# Patient Record
Sex: FEMALE | Race: WHITE | NOT HISPANIC OR LATINO | Employment: OTHER | URBAN - METROPOLITAN AREA
[De-identification: names, ages, dates, MRNs, and addresses within clinical notes are randomized per-mention and may not be internally consistent; named-entity substitution may affect disease eponyms.]

---

## 2017-01-14 ENCOUNTER — HOSPITAL ENCOUNTER (OUTPATIENT)
Dept: URGENT CARE | Facility: CLINIC | Age: 67
Discharge: HOME | End: 2017-01-14
Attending: FAMILY MEDICINE | Admitting: FAMILY MEDICINE

## 2017-04-07 ENCOUNTER — INPATIENT HOSPITAL (AMBULATORY)
Dept: URBAN - METROPOLITAN AREA HOSPITAL 84 | Facility: HOSPITAL | Age: 67
End: 2017-04-07

## 2017-04-07 DIAGNOSIS — D12.2 BENIGN NEOPLASM OF ASCENDING COLON: ICD-10-CM

## 2017-04-07 DIAGNOSIS — R13.10 DYSPHAGIA, UNSPECIFIED: ICD-10-CM

## 2017-04-07 DIAGNOSIS — R19.7 DIARRHEA, UNSPECIFIED: ICD-10-CM

## 2017-04-07 DIAGNOSIS — R11.2 NAUSEA WITH VOMITING, UNSPECIFIED: ICD-10-CM

## 2017-04-07 DIAGNOSIS — K52.9 NONINFECTIVE GASTROENTERITIS AND COLITIS, UNSPECIFIED: ICD-10-CM

## 2017-04-07 DIAGNOSIS — K22.2 ESOPHAGEAL OBSTRUCTION: ICD-10-CM

## 2017-04-07 DIAGNOSIS — K44.9 DIAPHRAGMATIC HERNIA WITHOUT OBSTRUCTION OR GANGRENE: ICD-10-CM

## 2017-04-07 PROCEDURE — 45385 COLONOSCOPY W/LESION REMOVAL: CPT | Performed by: INTERNAL MEDICINE

## 2017-04-07 PROCEDURE — 43235 EGD DIAGNOSTIC BRUSH WASH: CPT | Performed by: INTERNAL MEDICINE

## 2017-04-07 PROCEDURE — 43450 DILATE ESOPHAGUS 1/MULT PASS: CPT | Performed by: INTERNAL MEDICINE

## 2017-04-07 PROCEDURE — 45380 COLONOSCOPY AND BIOPSY: CPT | Mod: 59 | Performed by: INTERNAL MEDICINE

## 2017-04-08 ENCOUNTER — INPATIENT HOSPITAL (AMBULATORY)
Dept: URBAN - METROPOLITAN AREA HOSPITAL 84 | Facility: HOSPITAL | Age: 67
End: 2017-04-08

## 2017-04-08 DIAGNOSIS — K22.2 ESOPHAGEAL OBSTRUCTION: ICD-10-CM

## 2017-04-08 DIAGNOSIS — R13.10 DYSPHAGIA, UNSPECIFIED: ICD-10-CM

## 2017-04-08 DIAGNOSIS — R19.7 DIARRHEA, UNSPECIFIED: ICD-10-CM

## 2017-04-08 DIAGNOSIS — K52.9 NONINFECTIVE GASTROENTERITIS AND COLITIS, UNSPECIFIED: ICD-10-CM

## 2017-04-08 DIAGNOSIS — K44.9 DIAPHRAGMATIC HERNIA WITHOUT OBSTRUCTION OR GANGRENE: ICD-10-CM

## 2017-04-08 DIAGNOSIS — R11.2 NAUSEA WITH VOMITING, UNSPECIFIED: ICD-10-CM

## 2017-04-08 PROCEDURE — 99231 SBSQ HOSP IP/OBS SF/LOW 25: CPT | Performed by: INTERNAL MEDICINE

## 2017-04-09 PROCEDURE — 99231 SBSQ HOSP IP/OBS SF/LOW 25: CPT | Performed by: INTERNAL MEDICINE

## 2017-06-29 ENCOUNTER — OFFICE (AMBULATORY)
Dept: URBAN - METROPOLITAN AREA CLINIC 64 | Facility: CLINIC | Age: 67
End: 2017-06-29

## 2017-06-29 VITALS
HEART RATE: 84 BPM | DIASTOLIC BLOOD PRESSURE: 72 MMHG | WEIGHT: 214 LBS | SYSTOLIC BLOOD PRESSURE: 135 MMHG | HEIGHT: 63 IN

## 2017-06-29 DIAGNOSIS — K22.70 BARRETT'S ESOPHAGUS WITHOUT DYSPLASIA: ICD-10-CM

## 2017-06-29 DIAGNOSIS — R19.5 OTHER FECAL ABNORMALITIES: ICD-10-CM

## 2017-06-29 LAB
CBC WITH DIFFERENTIAL/PLATELET: BASO (ABSOLUTE): 0 X10E3/UL (ref 0–0.2)
CBC WITH DIFFERENTIAL/PLATELET: BASOS: 1 %
CBC WITH DIFFERENTIAL/PLATELET: EOS (ABSOLUTE): 0.2 X10E3/UL (ref 0–0.4)
CBC WITH DIFFERENTIAL/PLATELET: EOS: 3 %
CBC WITH DIFFERENTIAL/PLATELET: HEMATOCRIT: 38.1 % (ref 34–46.6)
CBC WITH DIFFERENTIAL/PLATELET: HEMATOLOGY COMMENTS: (no result)
CBC WITH DIFFERENTIAL/PLATELET: HEMOGLOBIN: 11.9 G/DL (ref 11.1–15.9)
CBC WITH DIFFERENTIAL/PLATELET: IMMATURE CELLS: (no result)
CBC WITH DIFFERENTIAL/PLATELET: IMMATURE GRANS (ABS): 0 X10E3/UL (ref 0–0.1)
CBC WITH DIFFERENTIAL/PLATELET: IMMATURE GRANULOCYTES: 0 %
CBC WITH DIFFERENTIAL/PLATELET: LYMPHS (ABSOLUTE): 1.4 X10E3/UL (ref 0.7–3.1)
CBC WITH DIFFERENTIAL/PLATELET: LYMPHS: 27 %
CBC WITH DIFFERENTIAL/PLATELET: MCH: 29.6 PG (ref 26.6–33)
CBC WITH DIFFERENTIAL/PLATELET: MCHC: 31.2 G/DL — LOW (ref 31.5–35.7)
CBC WITH DIFFERENTIAL/PLATELET: MCV: 95 FL (ref 79–97)
CBC WITH DIFFERENTIAL/PLATELET: MONOCYTES(ABSOLUTE): 0.4 X10E3/UL (ref 0.1–0.9)
CBC WITH DIFFERENTIAL/PLATELET: MONOCYTES: 9 %
CBC WITH DIFFERENTIAL/PLATELET: NEUTROPHILS (ABSOLUTE): 3.1 X10E3/UL (ref 1.4–7)
CBC WITH DIFFERENTIAL/PLATELET: NEUTROPHILS: 60 %
CBC WITH DIFFERENTIAL/PLATELET: NRBC: (no result)
CBC WITH DIFFERENTIAL/PLATELET: PLATELETS: 249 X10E3/UL (ref 150–379)
CBC WITH DIFFERENTIAL/PLATELET: RBC: 4.02 X10E6/UL (ref 3.77–5.28)
CBC WITH DIFFERENTIAL/PLATELET: RDW: 14.2 % (ref 12.3–15.4)
CBC WITH DIFFERENTIAL/PLATELET: WBC: 5.2 X10E3/UL (ref 3.4–10.8)
FERRITIN, SERUM: 81 NG/ML (ref 15–150)
IRON AND TIBC: IRON BIND.CAP.(TIBC): 378 UG/DL (ref 250–450)
IRON AND TIBC: IRON SATURATION: 25 % (ref 15–55)
IRON AND TIBC: IRON, SERUM: 94 UG/DL (ref 27–139)
IRON AND TIBC: UIBC: 284 UG/DL (ref 118–369)
VITAMIN B12 AND FOLATE: FOLATE (FOLIC ACID), SERUM: 20 NG/ML (ref 3–?)
VITAMIN B12 AND FOLATE: VITAMIN B12: 253 PG/ML (ref 211–946)

## 2017-06-29 PROCEDURE — 99213 OFFICE O/P EST LOW 20 MIN: CPT | Performed by: NURSE PRACTITIONER

## 2017-10-13 ENCOUNTER — OFFICE (AMBULATORY)
Dept: URBAN - METROPOLITAN AREA CLINIC 64 | Facility: CLINIC | Age: 67
End: 2017-10-13

## 2017-10-13 VITALS
SYSTOLIC BLOOD PRESSURE: 140 MMHG | HEART RATE: 82 BPM | DIASTOLIC BLOOD PRESSURE: 76 MMHG | HEIGHT: 63 IN | WEIGHT: 218 LBS

## 2017-10-13 DIAGNOSIS — R14.0 ABDOMINAL DISTENSION (GASEOUS): ICD-10-CM

## 2017-10-13 DIAGNOSIS — R19.7 DIARRHEA, UNSPECIFIED: ICD-10-CM

## 2017-10-13 LAB
C DIFFICILE TOXINS A+B, EIA: NEGATIVE
OVA + PARASITE EXAM: (no result)
RESULT: RESULT 1: (no result)
STOOL CULTURE: CAMPYLOBACTER CULTURE: (no result)
STOOL CULTURE: E COLI SHIGA TOXIN EIA: NEGATIVE
STOOL CULTURE: SALMONELLA/SHIGELLA SCREEN: (no result)
WHITE BLOOD CELLS (WBC), STOOL: (no result)

## 2017-10-13 PROCEDURE — 99212 OFFICE O/P EST SF 10 MIN: CPT | Performed by: NURSE PRACTITIONER

## 2017-10-13 RX ORDER — SIMETHICONE 180 MG/1
540 CAPSULE, GELATIN COATED ORAL
Qty: 90 | Refills: 6 | Status: COMPLETED
Start: 2017-10-13 | End: 2018-12-19

## 2018-12-20 ENCOUNTER — OFFICE (AMBULATORY)
Dept: URBAN - METROPOLITAN AREA PATHOLOGY 4 | Facility: PATHOLOGY | Age: 68
End: 2018-12-20

## 2018-12-20 ENCOUNTER — HOSPITAL ENCOUNTER (OUTPATIENT)
Dept: OTHER | Facility: HOSPITAL | Age: 68
Setting detail: SPECIMEN
Discharge: HOME | End: 2018-12-20
Attending: INTERNAL MEDICINE | Admitting: INTERNAL MEDICINE

## 2018-12-20 ENCOUNTER — ON CAMPUS - OUTPATIENT (AMBULATORY)
Dept: URBAN - METROPOLITAN AREA HOSPITAL 2 | Facility: HOSPITAL | Age: 68
End: 2018-12-20

## 2018-12-20 VITALS
HEART RATE: 51 BPM | DIASTOLIC BLOOD PRESSURE: 52 MMHG | SYSTOLIC BLOOD PRESSURE: 111 MMHG | SYSTOLIC BLOOD PRESSURE: 135 MMHG | TEMPERATURE: 97.4 F | OXYGEN SATURATION: 98 % | DIASTOLIC BLOOD PRESSURE: 74 MMHG | HEART RATE: 78 BPM | RESPIRATION RATE: 18 BRPM | DIASTOLIC BLOOD PRESSURE: 53 MMHG | HEART RATE: 72 BPM | HEIGHT: 64 IN | HEART RATE: 62 BPM | HEART RATE: 53 BPM | DIASTOLIC BLOOD PRESSURE: 58 MMHG | SYSTOLIC BLOOD PRESSURE: 136 MMHG | WEIGHT: 210 LBS | RESPIRATION RATE: 16 BRPM | OXYGEN SATURATION: 97 % | RESPIRATION RATE: 15 BRPM | SYSTOLIC BLOOD PRESSURE: 142 MMHG | DIASTOLIC BLOOD PRESSURE: 70 MMHG | SYSTOLIC BLOOD PRESSURE: 130 MMHG | OXYGEN SATURATION: 100 %

## 2018-12-20 DIAGNOSIS — K22.70 BARRETT'S ESOPHAGUS WITHOUT DYSPLASIA: ICD-10-CM

## 2018-12-20 DIAGNOSIS — K44.9 DIAPHRAGMATIC HERNIA WITHOUT OBSTRUCTION OR GANGRENE: ICD-10-CM

## 2018-12-20 LAB
GI HISTOLOGY: A. UNSPECIFIED: (no result)
GI HISTOLOGY: PDF REPORT: (no result)

## 2018-12-20 PROCEDURE — 88305 TISSUE EXAM BY PATHOLOGIST: CPT | Mod: 26 | Performed by: INTERNAL MEDICINE

## 2018-12-20 PROCEDURE — 43239 EGD BIOPSY SINGLE/MULTIPLE: CPT | Performed by: INTERNAL MEDICINE

## 2019-06-03 ENCOUNTER — HOSPITAL ENCOUNTER (OUTPATIENT)
Dept: OTHER | Facility: HOSPITAL | Age: 69
Discharge: HOME | End: 2019-06-03
Attending: EMERGENCY MEDICINE | Admitting: EMERGENCY MEDICINE

## 2019-07-25 PROBLEM — M79.661 RIGHT CALF PAIN: Status: ACTIVE | Noted: 2018-06-26

## 2019-07-25 PROBLEM — M25.551 HIP PAIN, RIGHT: Status: ACTIVE | Noted: 2017-01-14

## 2019-07-25 PROBLEM — I10 HYPERTENSION: Status: ACTIVE | Noted: 2019-07-25

## 2019-07-25 PROBLEM — H92.01 OTALGIA, RIGHT: Status: ACTIVE | Noted: 2018-12-28

## 2019-07-25 PROBLEM — K21.9 GASTROESOPHAGEAL REFLUX DISEASE: Status: ACTIVE | Noted: 2019-07-25

## 2019-07-25 PROBLEM — E07.9 THYROID DISORDER: Status: ACTIVE | Noted: 2019-07-25

## 2019-07-25 PROBLEM — E78.5 HYPERLIPIDEMIA: Status: ACTIVE | Noted: 2019-07-25

## 2019-07-25 PROBLEM — E03.9 HYPOTHYROIDISM: Status: ACTIVE | Noted: 2019-07-25

## 2019-07-25 RX ORDER — AMLODIPINE BESYLATE AND BENAZEPRIL HYDROCHLORIDE 5; 20 MG/1; MG/1
CAPSULE ORAL
COMMUNITY
Start: 2018-06-26 | End: 2021-03-08

## 2019-07-25 RX ORDER — DULOXETIN HYDROCHLORIDE 20 MG/1
20 CAPSULE, DELAYED RELEASE ORAL DAILY
Refills: 3 | COMMUNITY
Start: 2019-05-06 | End: 2019-07-30

## 2019-07-25 RX ORDER — METOPROLOL TARTRATE 50 MG/1
TABLET, FILM COATED ORAL
COMMUNITY
Start: 2018-06-26 | End: 2021-03-08

## 2019-07-25 RX ORDER — OMEPRAZOLE 40 MG/1
CAPSULE, DELAYED RELEASE ORAL
COMMUNITY
Start: 2014-04-30 | End: 2020-05-14

## 2019-07-25 RX ORDER — LEVOTHYROXINE SODIUM 0.12 MG/1
125 TABLET ORAL DAILY
COMMUNITY
Start: 2015-10-04 | End: 2021-09-07 | Stop reason: SDUPTHER

## 2019-07-25 RX ORDER — HYDROCODONE BITARTRATE AND ACETAMINOPHEN 5; 325 MG/1; MG/1
TABLET ORAL
Refills: 0 | COMMUNITY
Start: 2019-06-01 | End: 2019-07-30

## 2019-07-25 RX ORDER — TIMOLOL MALEATE 10 MG/1
TABLET ORAL
COMMUNITY
Start: 2018-06-26 | End: 2019-11-07

## 2019-07-25 RX ORDER — GABAPENTIN 600 MG/1
TABLET ORAL
COMMUNITY
Start: 2018-06-26 | End: 2019-07-30 | Stop reason: SDUPTHER

## 2019-07-30 ENCOUNTER — OFFICE VISIT (OUTPATIENT)
Dept: ENDOCRINOLOGY | Facility: CLINIC | Age: 69
End: 2019-07-30

## 2019-07-30 VITALS
OXYGEN SATURATION: 98 % | HEIGHT: 63 IN | BODY MASS INDEX: 37.03 KG/M2 | DIASTOLIC BLOOD PRESSURE: 68 MMHG | WEIGHT: 209 LBS | HEART RATE: 77 BPM | SYSTOLIC BLOOD PRESSURE: 110 MMHG

## 2019-07-30 DIAGNOSIS — E03.9 ACQUIRED HYPOTHYROIDISM: ICD-10-CM

## 2019-07-30 DIAGNOSIS — E11.65 UNCONTROLLED TYPE 2 DIABETES MELLITUS WITH HYPERGLYCEMIA (HCC): Primary | ICD-10-CM

## 2019-07-30 DIAGNOSIS — I10 ESSENTIAL HYPERTENSION: ICD-10-CM

## 2019-07-30 DIAGNOSIS — E78.5 HYPERLIPIDEMIA, UNSPECIFIED HYPERLIPIDEMIA TYPE: ICD-10-CM

## 2019-07-30 PROBLEM — IMO0002 DIABETES MELLITUS TYPE 2, UNCONTROLLED: Status: ACTIVE | Noted: 2019-07-30

## 2019-07-30 PROCEDURE — 99204 OFFICE O/P NEW MOD 45 MIN: CPT | Performed by: INTERNAL MEDICINE

## 2019-07-30 RX ORDER — BRINZOLAMIDE 10 MG/ML
1 SUSPENSION/ DROPS OPHTHALMIC 3 TIMES DAILY
COMMUNITY
End: 2020-05-14

## 2019-07-30 RX ORDER — HYDROCHLOROTHIAZIDE 25 MG/1
25 TABLET ORAL DAILY
COMMUNITY
End: 2019-07-30

## 2019-07-30 RX ORDER — MIRABEGRON 50 MG/1
TABLET, FILM COATED, EXTENDED RELEASE ORAL
COMMUNITY
Start: 2019-05-07 | End: 2020-05-14

## 2019-07-30 RX ORDER — VALSARTAN 80 MG/1
TABLET ORAL
COMMUNITY
Start: 2019-05-22 | End: 2020-05-14

## 2019-07-30 RX ORDER — MAGNESIUM 200 MG
1000 TABLET ORAL DAILY
Qty: 100 EACH | Refills: 4 | Status: SHIPPED | OUTPATIENT
Start: 2019-07-30 | End: 2019-11-07

## 2019-07-30 RX ORDER — ROPINIROLE 1 MG/1
1 TABLET, FILM COATED ORAL 3 TIMES DAILY
COMMUNITY
End: 2019-07-30

## 2019-07-30 RX ORDER — GABAPENTIN 100 MG/1
100 CAPSULE ORAL 3 TIMES DAILY
COMMUNITY
Start: 2019-05-23 | End: 2019-11-07

## 2019-07-30 RX ORDER — BLOOD SUGAR DIAGNOSTIC
STRIP MISCELLANEOUS
Refills: 3 | COMMUNITY
Start: 2019-06-14 | End: 2023-01-28

## 2019-07-30 RX ORDER — LISINOPRIL 40 MG/1
40 TABLET ORAL DAILY
COMMUNITY
End: 2019-07-30

## 2019-07-30 RX ORDER — DULOXETIN HYDROCHLORIDE 20 MG/1
20 CAPSULE, DELAYED RELEASE ORAL DAILY
Qty: 30 CAPSULE | Refills: 3 | Status: SHIPPED | OUTPATIENT
Start: 2019-07-30 | End: 2020-05-14

## 2019-07-30 RX ORDER — ERGOCALCIFEROL (VITAMIN D2) 50 MCG
2000 CAPSULE ORAL DAILY
Qty: 100 CAPSULE | Refills: 4 | Status: SHIPPED | OUTPATIENT
Start: 2019-07-30 | End: 2019-11-07

## 2019-07-30 RX ORDER — FENOFIBRATE 160 MG/1
160 TABLET ORAL DAILY
COMMUNITY
Start: 2019-05-22 | End: 2022-03-10

## 2019-07-30 RX ORDER — COLESEVELAM 180 1/1
1875 TABLET ORAL 3 TIMES DAILY
COMMUNITY
End: 2019-07-30

## 2019-07-30 RX ORDER — BACLOFEN 10 MG/1
TABLET ORAL
Refills: 0 | COMMUNITY
Start: 2019-06-26 | End: 2019-07-30

## 2019-07-30 RX ORDER — EZETIMIBE 10 MG/1
10 TABLET ORAL DAILY
COMMUNITY
End: 2019-07-30

## 2019-07-30 RX ORDER — PRAVASTATIN SODIUM 40 MG
40 TABLET ORAL NIGHTLY
COMMUNITY
Start: 2019-05-22

## 2019-07-30 RX ORDER — NITROFURANTOIN 25; 75 MG/1; MG/1
CAPSULE ORAL
Refills: 0 | COMMUNITY
Start: 2019-07-02 | End: 2019-07-30

## 2019-07-30 NOTE — PATIENT INSTRUCTIONS
Please get all labs done fasting in the next few days  Please a start vitamin D 2000 units p.o. daily  Please start vitamin B12 1000 mcg sublingual daily  Please arrange for comprehensive diabetes education  Follow-up in 3 months.

## 2019-07-30 NOTE — PROGRESS NOTES
Endocrine Consult Outpatient  Referred by Dr. Jose Brenner for diabetes mellitus type 2 consultation.  Patient Care Team:  Jose Brenner MD as PCP - General  Sanju Aldana MD as PCP - Claims Attributed  Jose Brenner MD as PCP - Family Medicine     Chief Complaint: Uncontrolled Diabetes Type 2    HPI: 69-year-old female with history of type 2 diabetes which was diagnosed in December 2018, history of hypertension, hyperlipidemia, hypothyroidism, stomach ulcer, diverticulitis, reflux disease, Clement's esophagus is referred for diabetes consultation.  She has not done diabetes education.  She checks blood sugars 1-2 times per day pretty good numbers most of the time.  She unfortunately did not bring blood sugar records for review.  She is trying to work on her diet.  She is currently on metformin 500 mg twice a day which was started at the end of May 2019.  She tells me that since metformin was a started her blood sugars have improved.  And she is tolerating metformin well.    Hypertention: Well-controlled  Hyperlipidemia: On pravastatin.    Old records reviewed: Labs from May 14, 2019 showed a white count of 6.4, hemoglobin 12.9, hematocrit 41.9 and platelet count was 67, sodium 147, potassium 4.5, chloride 105, CO2 28, glucose 128, BUN was 14, creatinine 7 with calcium 9.9, AST 22, ALT 23, A1c was 8.9%, LDL cholesterol 103, triglycerides 668, TSH was 1.78, urine microalbumin creatinine ratio was 10    Past Medical History:   Diagnosis Date   • Diverticulitis    • Hyperlipidemia    • Hypertension    • Type 2 diabetes mellitus (CMS/[facility])        Social History     Socioeconomic History   • Marital status:      Spouse name: Not on file   • Number of children: Not on file   • Years of education: Not on file   • Highest education level: Not on file   Tobacco Use   • Smoking status: Former Smoker   Substance and Sexual Activity   • Alcohol use: Yes     Frequency: Never     Comment: occasonally /  social       Family History   Problem Relation Age of Onset   • Stroke Mother    • Cancer Father         prostate   • Heart disease Brother    • Cancer Brother         pranciritic   • Cancer Maternal Grandmother         colon   • Heart disease Paternal Grandmother        Allergies   Allergen Reactions   • Amoxicillin-Pot Clavulanate Diarrhea   • Ciprofloxacin Myalgia       ROS:   Constitutional:  Admit fatigue, tiredness.    Eyes:  Denies change in visual acuity   HENT:  Denies nasal congestion or sore throat   Respiratory: denies cough, shortness of breath.   Cardiovascular:  denies chest pain, edema   GI:  Denies abdominal pain, nausea, vomiting  :  Denies dysuria   Musculoskeletal:  Denies back pain or joint pain   Integument:  Denies dry skin, rash   Neurologic:  Denies headache, focal weakness or sensory changes   Endocrine:  Denies polyuria or polydipsia   Psychiatric:  Denies depression or anxiety      Vitals:    07/30/19 1400   BP: 110/68   Pulse: 77   SpO2: 98%        Physical Exam:  GEN: NAD, conversant  EYES: EOMI, PERRL, no conjunctival erythema  NECK: no thyromegaly, full ROM   CV: RRR, no murmurs/rubs/gallops, no peripheral edema  LUNG: CTAB, no wheezes/rales/ronchi  SKIN: no rashes, no acanthosis  MSK: no deformities, full ROM of all extremities  NEURO: no tremors, DTR normal  PSYCH: AOX3, appropriate mood, affect normal      Results Review:     I reviewed the patient's new clinical results.    Lab Results   Component Value Date    GLUCOSE 112 (H) 06/01/2019    BUN 16 06/01/2019    CREATININE 1.0 06/01/2019    BCR 16.0 06/01/2019    K 3.7 06/01/2019    CO2 24 06/01/2019    CALCIUM 9.1 06/01/2019    ALBUMIN 3.8 06/01/2019    LABIL2 2.1 (H) 06/01/2019    AST 19 06/01/2019    ALT 25 06/01/2019    CHOL 249 (H) 04/07/2017    TRIG 616 (H) 04/07/2017    HDL 28 (L) 04/07/2017    LDL 53 04/07/2017     No results found for: HGBA1C  Lab Results   Component Value Date    CREATININE 1.0 06/01/2019     Lab  Results   Component Value Date    TSH 0.98 04/07/2017       Medication Review: Reviewed.       Current Outpatient Medications:   •  ACCU-CHEK DOLLY PLUS test strip, USE TO TEST BLOOD SUGAR BID, Disp: , Rfl: 3  •  amLODIPine Besy-Benazepril HCl (LOTREL PO), LOTREL CAPS, Disp: , Rfl:   •  Dorzolamide HCl-Timolol Mal PF 22.3-6.8 MG/ML solution, , Disp: , Rfl:   •  fenofibrate 160 MG tablet, , Disp: , Rfl:   •  gabapentin (NEURONTIN) 100 MG capsule, 100 mg 3 (Three) Times a Day., Disp: , Rfl:   •  levothyroxine (SYNTHROID) 125 MCG tablet, SYNTHROID 125 MCG TABS, Disp: , Rfl:   •  metFORMIN (GLUCOPHAGE) 500 MG tablet, , Disp: , Rfl:   •  metoprolol tartrate (LOPRESSOR) 100 MG tablet, METOPROLOL TARTRATE TABS, Disp: , Rfl:   •  MYRBETRIQ 50 MG tablet sustained-release 24 hour 24 hr tablet, , Disp: , Rfl:   •  omeprazole (priLOSEC) 40 MG capsule, OMEPRAZOLE 40 MG CPDR, Disp: , Rfl:   •  pravastatin (PRAVACHOL) 40 MG tablet, , Disp: , Rfl:   •  valsartan (DIOVAN) 80 MG tablet, , Disp: , Rfl:   •  brinzolamide (AZOPT) 1 % ophthalmic suspension, 1 drop 3 (Three) Times a Day., Disp: , Rfl:   •  timolol (BLOCADREN) 10 MG tablet, TIMOLOL MALEATE TABS, Disp: , Rfl:     Assessment/Plan   1. Diabetes mellitus type II: She tells me her blood sugars have improved since metformin, at this time I will I will refer her for diabetes education, continue metformin and get some labs for further evaluation before making any further recommendations with regards to medications.  2.  Hypertension: Well-controlled, continue current medications  3.  Hyperlipidemia: Specifically she has high triglyceride level, she is on fenofibrate with pravastatin, I have advised her to cut down the dairy products, fried food and avoid processed carbohydrates especially official sugars.  We will follow lipid panel.  4.  Hypothyroidism: We will follow TSH and free T4.   5.  Ddiabetic neuropathy: Advised to take vitamin D and B12 supplementation.      "        Preeti Herrera MD FACE.  06/15/19  4:34 PM      EMR Dragon / transcription disclaimer:     \"Dictated utilizing Dragon dictation\".               "

## 2019-08-02 ENCOUNTER — LAB (OUTPATIENT)
Dept: LAB | Facility: HOSPITAL | Age: 69
End: 2019-08-02

## 2019-08-02 DIAGNOSIS — I10 ESSENTIAL HYPERTENSION: ICD-10-CM

## 2019-08-02 DIAGNOSIS — E11.65 UNCONTROLLED TYPE 2 DIABETES MELLITUS WITH HYPERGLYCEMIA (HCC): ICD-10-CM

## 2019-08-02 DIAGNOSIS — E03.9 ACQUIRED HYPOTHYROIDISM: ICD-10-CM

## 2019-08-02 DIAGNOSIS — E78.5 HYPERLIPIDEMIA, UNSPECIFIED HYPERLIPIDEMIA TYPE: ICD-10-CM

## 2019-08-02 LAB
ALBUMIN SERPL-MCNC: 3.9 G/DL (ref 3.5–4.8)
ALBUMIN UR-MCNC: 152 MG/L
ALBUMIN/GLOB SERPL: 1.5 G/DL (ref 1–1.7)
ALP SERPL-CCNC: 58 U/L (ref 32–91)
ALT SERPL W P-5'-P-CCNC: 23 U/L (ref 14–54)
ANION GAP SERPL CALCULATED.3IONS-SCNC: 13.2 MMOL/L (ref 5–15)
ARTICHOKE IGE QN: 40 MG/DL (ref 0–100)
AST SERPL-CCNC: 16 U/L (ref 15–41)
BILIRUB SERPL-MCNC: 0.7 MG/DL (ref 0.3–1.2)
BUN BLD-MCNC: 18 MG/DL (ref 8–20)
BUN/CREAT SERPL: 18 (ref 5.4–26.2)
CALCIUM SPEC-SCNC: 9.4 MG/DL (ref 8.9–10.3)
CHLORIDE SERPL-SCNC: 103 MMOL/L (ref 101–111)
CHOLEST SERPL-MCNC: 135 MG/DL
CO2 SERPL-SCNC: 27 MMOL/L (ref 22–32)
CREAT BLD-MCNC: 1 MG/DL (ref 0.4–1)
CREAT UR-MCNC: 134.4 MG/DL
GFR SERPL CREATININE-BSD FRML MDRD: 55 ML/MIN/1.73
GLOBULIN UR ELPH-MCNC: 2.6 GM/DL (ref 2.5–3.8)
GLUCOSE BLD-MCNC: 115 MG/DL (ref 65–99)
HBA1C MFR BLD: 7.3 % (ref 3.5–5.6)
HDLC SERPL QL: 3.55
HDLC SERPL-MCNC: 38 MG/DL
LDLC/HDLC SERPL: 1.56 {RATIO}
MICROALBUMIN/CREAT UR: 113.1 UG/MG
POTASSIUM BLD-SCNC: 4.2 MMOL/L (ref 3.6–5.1)
PROT SERPL-MCNC: 6.5 G/DL (ref 6.1–7.9)
SODIUM BLD-SCNC: 139 MMOL/L (ref 136–144)
TRIGL SERPL-MCNC: 188 MG/DL
VLDLC SERPL-MCNC: 37.6 MG/DL

## 2019-08-02 PROCEDURE — 36415 COLL VENOUS BLD VENIPUNCTURE: CPT

## 2019-08-02 PROCEDURE — 83036 HEMOGLOBIN GLYCOSYLATED A1C: CPT | Performed by: INTERNAL MEDICINE

## 2019-08-02 PROCEDURE — 82570 ASSAY OF URINE CREATININE: CPT | Performed by: INTERNAL MEDICINE

## 2019-08-02 PROCEDURE — 80053 COMPREHEN METABOLIC PANEL: CPT | Performed by: INTERNAL MEDICINE

## 2019-08-02 PROCEDURE — 82043 UR ALBUMIN QUANTITATIVE: CPT | Performed by: INTERNAL MEDICINE

## 2019-08-02 PROCEDURE — 80061 LIPID PANEL: CPT | Performed by: INTERNAL MEDICINE

## 2019-08-08 ENCOUNTER — OFFICE VISIT (OUTPATIENT)
Dept: ENDOCRINOLOGY | Facility: CLINIC | Age: 69
End: 2019-08-08

## 2019-08-08 DIAGNOSIS — E11.65 UNCONTROLLED TYPE 2 DIABETES MELLITUS WITH HYPERGLYCEMIA (HCC): ICD-10-CM

## 2019-08-08 PROCEDURE — G0108 DIAB MANAGE TRN  PER INDIV: HCPCS | Performed by: DIETITIAN, REGISTERED

## 2019-08-20 ENCOUNTER — OFFICE VISIT (OUTPATIENT)
Dept: ENDOCRINOLOGY | Facility: CLINIC | Age: 69
End: 2019-08-20

## 2019-08-20 DIAGNOSIS — E11.9 CONTROLLED TYPE 2 DIABETES MELLITUS WITHOUT COMPLICATION, WITHOUT LONG-TERM CURRENT USE OF INSULIN (HCC): Primary | ICD-10-CM

## 2019-08-20 PROCEDURE — G0109 DIAB MANAGE TRN IND/GROUP: HCPCS | Performed by: INTERNAL MEDICINE

## 2019-08-29 ENCOUNTER — TRANSCRIBE ORDERS (OUTPATIENT)
Dept: ADMINISTRATIVE | Facility: HOSPITAL | Age: 69
End: 2019-08-29

## 2019-08-29 DIAGNOSIS — M79.605 BILATERAL LEG PAIN: ICD-10-CM

## 2019-08-29 DIAGNOSIS — I70.213 INTERMITTENT CLAUDICATION OF BOTH LOWER EXTREMITIES DUE TO ATHEROSCLEROSIS (HCC): ICD-10-CM

## 2019-08-29 DIAGNOSIS — R09.89 DECREASED PEDAL PULSES: Primary | ICD-10-CM

## 2019-08-29 DIAGNOSIS — M79.604 BILATERAL LEG PAIN: ICD-10-CM

## 2019-09-04 ENCOUNTER — OFFICE VISIT (OUTPATIENT)
Dept: ENDOCRINOLOGY | Facility: CLINIC | Age: 69
End: 2019-09-04

## 2019-09-04 DIAGNOSIS — E11.65 TYPE 2 DIABETES MELLITUS WITH HYPERGLYCEMIA, WITHOUT LONG-TERM CURRENT USE OF INSULIN (HCC): ICD-10-CM

## 2019-09-04 PROCEDURE — G0109 DIAB MANAGE TRN IND/GROUP: HCPCS | Performed by: DIETITIAN, REGISTERED

## 2019-09-05 ENCOUNTER — HOSPITAL ENCOUNTER (OUTPATIENT)
Dept: CT IMAGING | Facility: HOSPITAL | Age: 69
Discharge: HOME | End: 2019-09-05

## 2019-09-05 ENCOUNTER — HOSPITAL ENCOUNTER (OUTPATIENT)
Dept: CARDIOLOGY | Facility: HOSPITAL | Age: 69
Setting detail: HOSPITAL OUTPATIENT SURGERY
Discharge: HOME | End: 2019-09-05
Admitting: NURSE PRACTITIONER

## 2019-09-05 DIAGNOSIS — M79.605 BILATERAL LEG PAIN: ICD-10-CM

## 2019-09-05 DIAGNOSIS — I70.213 INTERMITTENT CLAUDICATION OF BOTH LOWER EXTREMITIES DUE TO ATHEROSCLEROSIS (HCC): ICD-10-CM

## 2019-09-05 DIAGNOSIS — M79.604 BILATERAL LEG PAIN: ICD-10-CM

## 2019-09-05 DIAGNOSIS — R09.89 DECREASED PEDAL PULSES: ICD-10-CM

## 2019-09-05 LAB
BH CV LOWER ARTERIAL LEFT ABI RATIO: 0.63
BH CV LOWER ARTERIAL LEFT CALF RATIO: 0.8
BH CV LOWER ARTERIAL LEFT DORSALIS PEDIS SYS MAX: 76 MMHG
BH CV LOWER ARTERIAL LEFT GREAT TOE SYS MAX: 63 MMHG
BH CV LOWER ARTERIAL LEFT LOW THIGH SYS MAX: 223 MMHG
BH CV LOWER ARTERIAL LEFT POPLITEAL SYS MAX: 110 MMHG
BH CV LOWER ARTERIAL LEFT POST TIBIAL SYS MAX: 87 MMHG
BH CV LOWER ARTERIAL LEFT TBI RATIO: 0.46
BH CV LOWER ARTERIAL RIGHT ABI RATIO: 1.07
BH CV LOWER ARTERIAL RIGHT CALF RATIO: 1.13
BH CV LOWER ARTERIAL RIGHT DORSALIS PEDIS SYS MAX: 143 MMHG
BH CV LOWER ARTERIAL RIGHT GREAT TOE SYS MAX: 119 MMHG
BH CV LOWER ARTERIAL RIGHT LOW THIGH SYS MAX: 187 MMHG
BH CV LOWER ARTERIAL RIGHT POPLITEAL SYS MAX: 156 MMHG
BH CV LOWER ARTERIAL RIGHT POST TIBIAL SYS MAX: 156 MMHG
BH CV LOWER ARTERIAL RIGHT TBI RATIO: 0.86
CREAT BLDA-MCNC: 1.2 MG/DL (ref 0.6–1.3)
UPPER ARTERIAL LEFT ARM BRACHIAL SYS MAX: 131 MMHG
UPPER ARTERIAL RIGHT ARM BRACHIAL SYS MAX: 138 MMHG

## 2019-09-05 PROCEDURE — 93923 UPR/LXTR ART STDY 3+ LVLS: CPT

## 2019-09-05 PROCEDURE — 82565 ASSAY OF CREATININE: CPT

## 2019-09-11 ENCOUNTER — TRANSCRIBE ORDERS (OUTPATIENT)
Dept: ADMINISTRATIVE | Facility: HOSPITAL | Age: 69
End: 2019-09-11

## 2019-09-11 ENCOUNTER — LAB (OUTPATIENT)
Dept: LAB | Facility: HOSPITAL | Age: 69
End: 2019-09-11

## 2019-09-11 DIAGNOSIS — I10 HYPERTENSION, UNSPECIFIED TYPE: ICD-10-CM

## 2019-09-11 DIAGNOSIS — I10 HYPERTENSION, UNSPECIFIED TYPE: Primary | ICD-10-CM

## 2019-09-11 LAB
ANION GAP SERPL CALCULATED.3IONS-SCNC: 12.6 MMOL/L (ref 5–15)
BUN BLD-MCNC: 20 MG/DL (ref 8–20)
BUN/CREAT SERPL: 20 (ref 5.4–26.2)
CALCIUM SPEC-SCNC: 9.6 MG/DL (ref 8.9–10.3)
CHLORIDE SERPL-SCNC: 104 MMOL/L (ref 101–111)
CO2 SERPL-SCNC: 29 MMOL/L (ref 22–32)
CREAT BLD-MCNC: 1 MG/DL (ref 0.4–1)
GFR SERPL CREATININE-BSD FRML MDRD: 55 ML/MIN/1.73
GLUCOSE BLD-MCNC: 105 MG/DL (ref 65–99)
POTASSIUM BLD-SCNC: 4.6 MMOL/L (ref 3.6–5.1)
SODIUM BLD-SCNC: 141 MMOL/L (ref 136–144)

## 2019-09-11 PROCEDURE — 36415 COLL VENOUS BLD VENIPUNCTURE: CPT

## 2019-09-11 PROCEDURE — 80048 BASIC METABOLIC PNL TOTAL CA: CPT

## 2019-09-12 ENCOUNTER — HOSPITAL ENCOUNTER (OUTPATIENT)
Dept: CT IMAGING | Facility: HOSPITAL | Age: 69
Discharge: HOME | End: 2019-09-12
Admitting: NURSE PRACTITIONER

## 2019-09-12 PROCEDURE — 75635 CT ANGIO ABDOMINAL ARTERIES: CPT

## 2019-09-12 PROCEDURE — 0 IOPAMIDOL PER 1 ML: Performed by: NURSE PRACTITIONER

## 2019-09-12 RX ADMIN — IOPAMIDOL 100 ML: 755 INJECTION, SOLUTION INTRAVENOUS at 11:45

## 2019-10-01 ENCOUNTER — APPOINTMENT (OUTPATIENT)
Dept: VASCULAR SURGERY | Facility: HOSPITAL | Age: 69
End: 2019-10-01

## 2019-10-01 PROCEDURE — G0463 HOSPITAL OUTPT CLINIC VISIT: HCPCS | Performed by: SURGERY

## 2019-10-02 ENCOUNTER — TRANSCRIBE ORDERS (OUTPATIENT)
Dept: ADMINISTRATIVE | Facility: HOSPITAL | Age: 69
End: 2019-10-02

## 2019-10-02 DIAGNOSIS — I73.9 PERIPHERAL VASCULAR DISEASE, UNSPECIFIED (HCC): Primary | ICD-10-CM

## 2019-10-30 RX ORDER — ACETAMINOPHEN 160 MG
TABLET,DISINTEGRATING ORAL
Refills: 4 | COMMUNITY
Start: 2019-07-30 | End: 2020-04-20

## 2019-10-30 RX ORDER — TRIAMCINOLONE ACETONIDE 1 MG/G
CREAM TOPICAL
Refills: 3 | COMMUNITY
Start: 2019-08-28 | End: 2019-11-07

## 2019-10-30 RX ORDER — INFLUENZA VACCINE, ADJUVANTED 15; 15; 15 UG/.5ML; UG/.5ML; UG/.5ML
INJECTION, SUSPENSION INTRAMUSCULAR
Refills: 0 | COMMUNITY
Start: 2019-09-19 | End: 2020-05-14

## 2019-10-30 RX ORDER — CHOLECALCIFEROL (VITAMIN D3) 25 MCG
1 TABLET,CHEWABLE ORAL DAILY
Refills: 4 | COMMUNITY
Start: 2019-07-30 | End: 2022-03-10

## 2019-11-07 ENCOUNTER — OFFICE VISIT (OUTPATIENT)
Dept: ENDOCRINOLOGY | Facility: CLINIC | Age: 69
End: 2019-11-07

## 2019-11-07 VITALS
HEIGHT: 63 IN | WEIGHT: 212 LBS | SYSTOLIC BLOOD PRESSURE: 130 MMHG | HEART RATE: 93 BPM | DIASTOLIC BLOOD PRESSURE: 65 MMHG | BODY MASS INDEX: 37.56 KG/M2 | OXYGEN SATURATION: 94 %

## 2019-11-07 DIAGNOSIS — I10 ESSENTIAL HYPERTENSION: ICD-10-CM

## 2019-11-07 DIAGNOSIS — E11.65 UNCONTROLLED TYPE 2 DIABETES MELLITUS WITH HYPERGLYCEMIA (HCC): Primary | ICD-10-CM

## 2019-11-07 DIAGNOSIS — E03.9 ACQUIRED HYPOTHYROIDISM: ICD-10-CM

## 2019-11-07 DIAGNOSIS — E78.2 MIXED HYPERLIPIDEMIA: ICD-10-CM

## 2019-11-07 DIAGNOSIS — E11.42 DIABETIC PERIPHERAL NEUROPATHY (HCC): ICD-10-CM

## 2019-11-07 PROCEDURE — 99214 OFFICE O/P EST MOD 30 MIN: CPT | Performed by: INTERNAL MEDICINE

## 2019-11-07 RX ORDER — METRONIDAZOLE 500 MG/1
TABLET ORAL
COMMUNITY
Start: 2019-11-06 | End: 2020-05-14

## 2019-11-07 RX ORDER — CIPROFLOXACIN 500 MG/1
TABLET, FILM COATED ORAL
COMMUNITY
Start: 2019-11-06 | End: 2020-05-14

## 2019-11-07 RX ORDER — ASPIRIN 81 MG/1
81 TABLET ORAL DAILY
COMMUNITY
End: 2023-02-02 | Stop reason: HOSPADM

## 2019-11-07 RX ORDER — CILOSTAZOL 100 MG/1
100 TABLET ORAL 2 TIMES DAILY
Refills: 5 | COMMUNITY
Start: 2019-10-24 | End: 2023-01-28

## 2019-11-07 NOTE — PATIENT INSTRUCTIONS
Continue current medications  Always keep glucose source with you in case of low blood sugars  Always get annual eye exam and flu vaccine  Follow-up in 6 months with labs.

## 2019-11-07 NOTE — PROGRESS NOTES
Endocrine Consult Outpatient  Referred by Dr. Jose Brenner for diabetes mellitus type 2 consultation.  Patient Care Team:  Jose Brenner MD as PCP - General  Sanju Aldana MD as PCP - Claims Attributed  Jose Brenner MD as PCP - Family Medicine     Chief Complaint: Uncontrolled Diabetes Type 2    HPI: 69-year-old female with history of type 2 diabetes which was diagnosed in December 2018, history of hypertension, hyperlipidemia, hypothyroidism, stomach ulcer, diverticulitis, reflux disease, Clement's esophagus is referred for diabetes consultation.  She has not done diabetes education.  She checks blood sugars 1-2 times per day pretty good numbers most of the time.  She unfortunately did not bring blood sugar records for review.  She is trying to work on her diet.  She is currently on metformin 500 mg twice a day which was started at the end of May 2019.  She tells me that since metformin was a started her blood sugars have improved.  And she is tolerating metformin well.    Hypertention: Well-controlled  Hyperlipidemia: On pravastatin.    Data reviewed:   Labs from August 2, 2019 showed an A1c of 7.3%, LDL 40, triglycerides 188, urine microalbumin creatinine ratio was 113.  Labs from May 14, 2019 showed a white count of 6.4, hemoglobin 12.9, hematocrit 41.9 and platelet count was 67, sodium 147, potassium 4.5, chloride 105, CO2 28, glucose 128, BUN was 14, creatinine 7 with calcium 9.9, AST 22, ALT 23, A1c was 8.9%, LDL cholesterol 103, triglycerides 668, TSH was 1.78, urine microalbumin creatinine ratio was 10    Past Medical History:   Diagnosis Date   • Diverticulitis    • Hyperlipidemia    • Hypertension    • Type 2 diabetes mellitus (CMS/[facility])        Social History     Socioeconomic History   • Marital status:      Spouse name: Not on file   • Number of children: Not on file   • Years of education: Not on file   • Highest education level: Not on file   Tobacco Use   • Smoking status:  Former Smoker   Substance and Sexual Activity   • Alcohol use: Yes     Frequency: Never     Comment: occasonally / social       Family History   Problem Relation Age of Onset   • Stroke Mother    • Cancer Father         prostate   • Heart disease Brother    • Cancer Brother         pranciritic   • Cancer Maternal Grandmother         colon   • Heart disease Paternal Grandmother        Allergies   Allergen Reactions   • Amoxicillin-Pot Clavulanate Diarrhea   • Ciprofloxacin Myalgia       ROS:   Constitutional:  Admit fatigue, tiredness.    Eyes:  Denies change in visual acuity   HENT:  Denies nasal congestion or sore throat   Respiratory: denies cough, shortness of breath.   Cardiovascular:  denies chest pain, edema   GI:  Denies abdominal pain, nausea, vomiting  :  Denies dysuria   Musculoskeletal:  Denies back pain or joint pain   Integument:  Denies dry skin, rash   Neurologic:  Denies headache, focal weakness or sensory changes   Endocrine:  Denies polyuria or polydipsia   Psychiatric:  Denies depression or anxiety      Vitals:    11/07/19 1147   BP: 130/65   Pulse: 93   SpO2: 94%        Physical Exam:  GEN: NAD, conversant, obese  EYES: EOMI, PERRL, no conjunctival erythema  NECK: no thyromegaly, full ROM   CV: RRR, no murmurs/rubs/gallops, no peripheral edema  LUNG: CTAB, no wheezes/rales/ronchi  SKIN: no rashes, no acanthosis  MSK: no deformities, full ROM of all extremities  NEURO: no tremors, DTR normal  PSYCH: AOX3, appropriate mood, affect normal      Results Review:     I reviewed the patient's new clinical results.    Lab Results   Component Value Date    GLUCOSE 105 (H) 09/11/2019    BUN 20 09/11/2019    CREATININE 1.00 09/11/2019    EGFRIFNONA 55 (L) 09/11/2019    BCR 20.0 09/11/2019    K 4.6 09/11/2019    CO2 29.0 09/11/2019    CALCIUM 9.6 09/11/2019    ALBUMIN 3.90 08/02/2019    LABIL2 2.1 (H) 06/01/2019    AST 16 08/02/2019    ALT 23 08/02/2019    CHOL 135 08/02/2019    TRIG 188 (H) 08/02/2019     HDL 38 (L) 08/02/2019    LDL 40 08/02/2019     Lab Results   Component Value Date    HGBA1C 7.3 (H) 08/02/2019     Lab Results   Component Value Date    MICROALBUR 152.0 (H) 08/02/2019    CREATININE 1.00 09/11/2019     Lab Results   Component Value Date    TSH 0.98 04/07/2017       Medication Review: Reviewed.       Current Outpatient Medications:   •  ACCU-CHEK DOLLY PLUS test strip, USE TO TEST BLOOD SUGAR BID, Disp: , Rfl: 3  •  amLODIPine Besy-Benazepril HCl (LOTREL PO), LOTREL CAPS, Disp: , Rfl:   •  aspirin 81 MG EC tablet, Take 81 mg by mouth Daily., Disp: , Rfl:   •  brinzolamide (AZOPT) 1 % ophthalmic suspension, 1 drop 3 (Three) Times a Day., Disp: , Rfl:   •  Cholecalciferol (VITAMIN D3) 50 MCG (2000 UT) capsule, , Disp: , Rfl: 4  •  cilostazol (PLETAL) 100 MG tablet, Take 100 mg by mouth 2 (Two) Times a Day., Disp: , Rfl: 5  •  ciprofloxacin (CIPRO) 500 MG tablet, , Disp: , Rfl:   •  Cyanocobalamin (B-12) 1000 MCG tablet controlled-release, DIS 1 T UNT D, Disp: , Rfl: 4  •  Dorzolamide HCl-Timolol Mal PF 22.3-6.8 MG/ML solution, , Disp: , Rfl:   •  DULoxetine (CYMBALTA) 20 MG capsule, Take 1 capsule by mouth Daily., Disp: 30 capsule, Rfl: 3  •  fenofibrate 160 MG tablet, , Disp: , Rfl:   •  levothyroxine (SYNTHROID) 125 MCG tablet, SYNTHROID 125 MCG TABS, Disp: , Rfl:   •  metFORMIN (GLUCOPHAGE) 500 MG tablet, , Disp: , Rfl:   •  metoprolol tartrate (LOPRESSOR) 50 MG tablet, METOPROLOL TARTRATE TABS, Disp: , Rfl:   •  metroNIDAZOLE (FLAGYL) 500 MG tablet, , Disp: , Rfl:   •  MYRBETRIQ 50 MG tablet sustained-release 24 hour 24 hr tablet, , Disp: , Rfl:   •  omeprazole (priLOSEC) 40 MG capsule, OMEPRAZOLE 40 MG CPDR, Disp: , Rfl:   •  pravastatin (PRAVACHOL) 40 MG tablet, , Disp: , Rfl:   •  valsartan (DIOVAN) 80 MG tablet, , Disp: , Rfl:   •  Vitamin D, Ergocalciferol, 2000 units capsule, Take 2,000 Units by mouth Daily., Disp: 100 capsule, Rfl: 4  •  Cyanocobalamin (VITAMIN B-12) 1000 MCG sublingual  "tablet, Place 1,000 mcg under the tongue Daily., Disp: 100 each, Rfl: 4  •  FLUAD 0.5 ML suspension prefilled syringe, ADM 0.5ML IM UTD, Disp: , Rfl: 0  •  gabapentin (NEURONTIN) 100 MG capsule, 100 mg 3 (Three) Times a Day., Disp: , Rfl:   •  timolol (BLOCADREN) 10 MG tablet, TIMOLOL MALEATE TABS, Disp: , Rfl:   •  triamcinolone (KENALOG) 0.1 % cream, APPLY TO THE AFFECTED AREA BID PRN, Disp: , Rfl: 3    Assessment/Plan   1. Diabetes mellitus type II: Fair control with last A1c at 7.3%.  She is tolerating metformin well.  She has done diabetes education and she is working on her diet.  Blood sugars have improved.  Continue current management and follow labs.  She follows with an ophthalmologist every 4 months.  She did get her flu vaccine.  2.  Hypertension: Well-controlled, continue current medications  3.  Hyperlipidemia: Specifically she has high triglyceride level, she is on fenofibrate with pravastatin, I have advised her to cut down the dairy products, fried food and avoid processed carbohydrates especially official sugars.  We will follow lipid panel.  4.  Hypothyroidism: We will follow TSH and free T4.   5.  Ddiabetic neuropathy: Is currently on Lyrica but has taken of gabapentin.  Currently taking vitamin D and B12 supplementation.             Preeti Herrera MD FACE.  06/15/19  4:34 PM      EMR Dragon / transcription disclaimer:     \"Dictated utilizing Dragon dictation\".               "

## 2020-02-17 ENCOUNTER — TRANSCRIBE ORDERS (OUTPATIENT)
Dept: ADMINISTRATIVE | Facility: HOSPITAL | Age: 70
End: 2020-02-17

## 2020-02-17 DIAGNOSIS — R42 DIZZINESS: Primary | ICD-10-CM

## 2020-02-17 DIAGNOSIS — G44.52 NEW DAILY PERSISTENT HEADACHE: ICD-10-CM

## 2020-02-19 ENCOUNTER — HOSPITAL ENCOUNTER (OUTPATIENT)
Dept: CT IMAGING | Facility: HOSPITAL | Age: 70
Discharge: HOME | End: 2020-02-19
Admitting: NURSE PRACTITIONER

## 2020-02-19 DIAGNOSIS — G44.52 NEW DAILY PERSISTENT HEADACHE: ICD-10-CM

## 2020-02-19 DIAGNOSIS — R42 DIZZINESS: ICD-10-CM

## 2020-02-19 PROCEDURE — 70450 CT HEAD/BRAIN W/O DYE: CPT

## 2020-04-20 RX ORDER — ACETAMINOPHEN 160 MG
TABLET,DISINTEGRATING ORAL
Qty: 100 CAPSULE | Refills: 1 | Status: SHIPPED | OUTPATIENT
Start: 2020-04-20 | End: 2020-12-07

## 2020-04-23 ENCOUNTER — HOSPITAL ENCOUNTER (OUTPATIENT)
Dept: CARDIOLOGY | Facility: HOSPITAL | Age: 70
Discharge: HOME | End: 2020-04-23
Admitting: SURGERY

## 2020-04-23 ENCOUNTER — APPOINTMENT (OUTPATIENT)
Dept: CARDIOLOGY | Facility: HOSPITAL | Age: 70
End: 2020-04-23

## 2020-04-23 DIAGNOSIS — I73.9 PERIPHERAL VASCULAR DISEASE, UNSPECIFIED (HCC): ICD-10-CM

## 2020-04-23 LAB
BH CV LOWER ARTERIAL LEFT ABI RATIO: 0.77
BH CV LOWER ARTERIAL LEFT CALF RATIO: 0.95
BH CV LOWER ARTERIAL LEFT DORSALIS PEDIS SYS MAX: 105 MMHG
BH CV LOWER ARTERIAL LEFT GREAT TOE SYS MAX: 66 MMHG
BH CV LOWER ARTERIAL LEFT LOW THIGH SYS MAX: 165 MMHG
BH CV LOWER ARTERIAL LEFT POPLITEAL SYS MAX: 147 MMHG
BH CV LOWER ARTERIAL LEFT POST TIBIAL SYS MAX: 120 MMHG
BH CV LOWER ARTERIAL LEFT TBI RATIO: 0.43
BH CV LOWER ARTERIAL RIGHT ABI RATIO: 1.17
BH CV LOWER ARTERIAL RIGHT CALF RATIO: 1.15
BH CV LOWER ARTERIAL RIGHT DORSALIS PEDIS SYS MAX: 169 MMHG
BH CV LOWER ARTERIAL RIGHT GREAT TOE SYS MAX: 145 MMHG
BH CV LOWER ARTERIAL RIGHT LOW THIGH SYS MAX: 210 MMHG
BH CV LOWER ARTERIAL RIGHT POPLITEAL SYS MAX: 179 MMHG
BH CV LOWER ARTERIAL RIGHT POST TIBIAL SYS MAX: 182 MMHG
BH CV LOWER ARTERIAL RIGHT TBI RATIO: 0.94
UPPER ARTERIAL LEFT ARM BRACHIAL SYS MAX: 145 MMHG
UPPER ARTERIAL RIGHT ARM BRACHIAL SYS MAX: 155 MMHG

## 2020-04-23 PROCEDURE — 93923 UPR/LXTR ART STDY 3+ LVLS: CPT

## 2020-05-07 ENCOUNTER — LAB (OUTPATIENT)
Dept: LAB | Facility: HOSPITAL | Age: 70
End: 2020-05-07

## 2020-05-07 DIAGNOSIS — E78.2 MIXED HYPERLIPIDEMIA: ICD-10-CM

## 2020-05-07 DIAGNOSIS — I10 ESSENTIAL HYPERTENSION: ICD-10-CM

## 2020-05-07 DIAGNOSIS — E03.9 ACQUIRED HYPOTHYROIDISM: ICD-10-CM

## 2020-05-07 DIAGNOSIS — E11.65 UNCONTROLLED TYPE 2 DIABETES MELLITUS WITH HYPERGLYCEMIA (HCC): ICD-10-CM

## 2020-05-07 DIAGNOSIS — E11.42 DIABETIC PERIPHERAL NEUROPATHY (HCC): ICD-10-CM

## 2020-05-07 LAB
ALBUMIN SERPL-MCNC: 4.2 G/DL (ref 3.5–5.2)
ALBUMIN UR-MCNC: <1.2 MG/DL
ALBUMIN/GLOB SERPL: 1.6 G/DL
ALP SERPL-CCNC: 51 U/L (ref 39–117)
ALT SERPL W P-5'-P-CCNC: 25 U/L (ref 1–33)
ANION GAP SERPL CALCULATED.3IONS-SCNC: 13.7 MMOL/L (ref 5–15)
AST SERPL-CCNC: 16 U/L (ref 1–32)
BILIRUB SERPL-MCNC: 0.4 MG/DL (ref 0.2–1.2)
BUN BLD-MCNC: 17 MG/DL (ref 8–23)
BUN/CREAT SERPL: 16.8 (ref 7–25)
CALCIUM SPEC-SCNC: 9.9 MG/DL (ref 8.6–10.5)
CHLORIDE SERPL-SCNC: 103 MMOL/L (ref 98–107)
CHOLEST SERPL-MCNC: 120 MG/DL (ref 0–200)
CO2 SERPL-SCNC: 25.3 MMOL/L (ref 22–29)
CREAT BLD-MCNC: 1.01 MG/DL (ref 0.57–1)
CREAT UR-MCNC: 87.2 MG/DL
GFR SERPL CREATININE-BSD FRML MDRD: 54 ML/MIN/1.73
GLOBULIN UR ELPH-MCNC: 2.7 GM/DL
GLUCOSE BLD-MCNC: 239 MG/DL (ref 65–99)
HDLC SERPL-MCNC: 40 MG/DL (ref 40–60)
LDLC SERPL CALC-MCNC: 28 MG/DL (ref 0–100)
LDLC/HDLC SERPL: 0.71 {RATIO}
MICROALBUMIN/CREAT UR: NORMAL MG/G{CREAT}
POTASSIUM BLD-SCNC: 4.1 MMOL/L (ref 3.5–5.2)
PROT SERPL-MCNC: 6.9 G/DL (ref 6–8.5)
SODIUM BLD-SCNC: 142 MMOL/L (ref 136–145)
T4 FREE SERPL-MCNC: 1.74 NG/DL (ref 0.93–1.7)
TRIGL SERPL-MCNC: 258 MG/DL (ref 0–150)
TSH SERPL DL<=0.05 MIU/L-ACNC: 3.05 UIU/ML (ref 0.27–4.2)
VLDLC SERPL-MCNC: 51.6 MG/DL (ref 5–40)

## 2020-05-07 PROCEDURE — 84439 ASSAY OF FREE THYROXINE: CPT

## 2020-05-07 PROCEDURE — 84443 ASSAY THYROID STIM HORMONE: CPT

## 2020-05-07 PROCEDURE — 36415 COLL VENOUS BLD VENIPUNCTURE: CPT

## 2020-05-07 PROCEDURE — 82570 ASSAY OF URINE CREATININE: CPT

## 2020-05-07 PROCEDURE — 80053 COMPREHEN METABOLIC PANEL: CPT

## 2020-05-07 PROCEDURE — 83036 HEMOGLOBIN GLYCOSYLATED A1C: CPT

## 2020-05-07 PROCEDURE — 80061 LIPID PANEL: CPT

## 2020-05-07 PROCEDURE — 82043 UR ALBUMIN QUANTITATIVE: CPT

## 2020-05-08 LAB — HBA1C MFR BLD: 8.7 % (ref 3.5–5.6)

## 2020-05-14 ENCOUNTER — TELEMEDICINE (OUTPATIENT)
Dept: ENDOCRINOLOGY | Facility: CLINIC | Age: 70
End: 2020-05-14

## 2020-05-14 VITALS
SYSTOLIC BLOOD PRESSURE: 117 MMHG | BODY MASS INDEX: 37.21 KG/M2 | HEART RATE: 101 BPM | HEIGHT: 63 IN | WEIGHT: 210 LBS | DIASTOLIC BLOOD PRESSURE: 92 MMHG

## 2020-05-14 DIAGNOSIS — I10 ESSENTIAL HYPERTENSION: ICD-10-CM

## 2020-05-14 DIAGNOSIS — E11.65 UNCONTROLLED TYPE 2 DIABETES MELLITUS WITH HYPERGLYCEMIA (HCC): Primary | ICD-10-CM

## 2020-05-14 DIAGNOSIS — E03.9 ACQUIRED HYPOTHYROIDISM: ICD-10-CM

## 2020-05-14 DIAGNOSIS — E78.2 MIXED HYPERLIPIDEMIA: ICD-10-CM

## 2020-05-14 PROCEDURE — 99214 OFFICE O/P EST MOD 30 MIN: CPT | Performed by: INTERNAL MEDICINE

## 2020-05-14 RX ORDER — SUMATRIPTAN 50 MG/1
TABLET, FILM COATED ORAL
COMMUNITY
Start: 2020-02-19 | End: 2020-05-14

## 2020-05-14 RX ORDER — LORATADINE 10 MG/1
CAPSULE, LIQUID FILLED ORAL
COMMUNITY
End: 2021-01-03

## 2020-05-14 RX ORDER — METOPROLOL SUCCINATE 50 MG/1
TABLET, EXTENDED RELEASE ORAL
COMMUNITY
Start: 2020-04-06 | End: 2020-05-14

## 2020-05-14 RX ORDER — BLOOD-GLUCOSE METER
EACH MISCELLANEOUS
COMMUNITY
Start: 2020-03-30 | End: 2023-01-28

## 2020-05-14 RX ORDER — LATANOPROST 50 UG/ML
1 SOLUTION/ DROPS OPHTHALMIC NIGHTLY
COMMUNITY
Start: 2020-03-03

## 2020-05-14 RX ORDER — FLUTICASONE PROPIONATE 50 MCG
2 SPRAY, SUSPENSION (ML) NASAL DAILY
COMMUNITY
End: 2021-01-03

## 2020-05-14 RX ORDER — FAMOTIDINE 20 MG/1
20 TABLET, FILM COATED ORAL 2 TIMES DAILY
COMMUNITY
Start: 2020-04-01 | End: 2022-03-10

## 2020-05-14 RX ORDER — DOXYCYCLINE HYCLATE 100 MG/1
CAPSULE ORAL
COMMUNITY
Start: 2020-03-19 | End: 2020-05-14

## 2020-05-14 NOTE — PATIENT INSTRUCTIONS
Jardiance 10 mg p.o. daily  If you develop any rash or itching or burning in the urine me and stop Jardiance  Please continue to work on your diet and activity  Please get regular eye exam and flu vaccine  Follow-up in 3 months with labs  Always keep glucose source in case of low blood sugar.  Blood pressure at home and send readings for review as well.

## 2020-05-14 NOTE — PROGRESS NOTES
Endocrine Progress Note Outpatient     Patient Care Team:  Jose Brenner MD as PCP - General  Jose Brenner MD as PCP - Family Medicine  You have chosen to receive care through a telehealth visit.  Do you consent to use a video/audio connection for your medical care today? Yes.     Chief Complaint: Follow-up type 2 diabetes: Visit conducted through audio and video conference utilizing [employer]    HPI: 70-year-old female with history of type 2 diabetes, hypertension, hyperlipidemia and hypothyroidism is followed through telehealth.  For type 2 diabetes: She is currently on metformin 500 mg twice a day.  She has been through diabetes education however has not been following her diet very well.  She did send blood sugar records for review and they are running between 1 40-2 100+.  Denies any low blood sugars.  Hypertension: Well-controlled  Hypothyroidism: On levothyroxine supplementation  Hyperlipidemia: She is currently on pravastatin and fenofibrate.    Past Medical History:   Diagnosis Date   • Diverticulitis    • Hyperlipidemia    • Hypertension    • Type 2 diabetes mellitus (CMS/[facility])    • Uncontrolled type 2 diabetes mellitus with hyperglycemia (CMS/[facility]) 7/30/2019       Social History     Socioeconomic History   • Marital status:      Spouse name: Not on file   • Number of children: Not on file   • Years of education: Not on file   • Highest education level: Not on file   Tobacco Use   • Smoking status: Former Smoker   • Smokeless tobacco: Never Used   Substance and Sexual Activity   • Alcohol use: Yes     Frequency: Never     Comment: occasonally / social       Family History   Problem Relation Age of Onset   • Stroke Mother    • Cancer Father         prostate   • Heart disease Brother    • Cancer Brother         pranciritic   • Cancer Maternal Grandmother         colon   • Heart disease Paternal Grandmother        Allergies   Allergen Reactions   • Amoxicillin-Pot Clavulanate Diarrhea   •  Ciprofloxacin Myalgia       ROS:   Constitutional:  Denies fatigue, tiredness.    Eyes:  Denies change in visual acuity   HENT:  Denies nasal congestion or sore throat   Respiratory: denies cough, shortness of breath.   Cardiovascular:  denies chest pain, edema   GI:  Denies abdominal pain, nausea, vomiting.   Musculoskeletal:  Denies back pain or joint pain   Integument:  Denies dry skin and rash   Neurologic:  Denies headache, focal weakness or sensory changes   Endocrine:  Denies polyuria or polydipsia   Psychiatric:  Denies depression or anxiety      Vitals:    05/14/20 0929   BP: 117/92   Pulse: 101       Physical Exam:  GEN: NAD, and looked healthy on video.  Alert and oriented and able to carry on conversation.  Breathing effort was normal.  Mood and affect was normal.      Results Review:     I reviewed the patient's new clinical results.    Lab Results   Component Value Date    HGBA1C 8.7 (H) 05/07/2020    HGBA1C 7.3 (H) 08/02/2019      Lab Results   Component Value Date    GLUCOSE 239 (H) 05/07/2020    BUN 17 05/07/2020    CREATININE 1.01 (H) 05/07/2020    EGFRIFNONA 54 (L) 05/07/2020    BCR 16.8 05/07/2020    K 4.1 05/07/2020    CO2 25.3 05/07/2020    CALCIUM 9.9 05/07/2020    ALBUMIN 4.20 05/07/2020    LABIL2 2.1 (H) 06/01/2019    AST 16 05/07/2020    ALT 25 05/07/2020    CHOL 120 05/07/2020    TRIG 258 (H) 05/07/2020    LDL 28 05/07/2020    HDL 40 05/07/2020     Lab Results   Component Value Date    TSH 3.050 05/07/2020    FREET4 1.74 (H) 05/07/2020         Medication Review: Reviewed.       Current Outpatient Medications:   •  ACCU-CHEK DOLLY PLUS test strip, USE TO TEST BLOOD SUGAR BID, Disp: , Rfl: 3  •  amLODIPine-benazepril (Lotrel) 5-20 MG per capsule, , Disp: , Rfl:   •  aspirin 81 MG EC tablet, Take 81 mg by mouth Daily., Disp: , Rfl:   •  Blood Glucose Monitoring Suppl (ACCU-CHEK DOLLY PLUS) w/Device kit, USE TO CHECK BLOOD SUGAR BID, Disp: , Rfl:   •  Cholecalciferol (VITAMIN D3) 50 MCG (2000  UT) capsule, TAKE 2,000 UNITS(1 CAPSULE) BY MOUTH DAILY, Disp: 100 capsule, Rfl: 1  •  cilostazol (PLETAL) 100 MG tablet, Take 100 mg by mouth 2 (Two) Times a Day., Disp: , Rfl: 5  •  Cyanocobalamin (B-12) 1000 MCG tablet controlled-release, DIS 1 T UNT D, Disp: , Rfl: 4  •  famotidine (PEPCID) 20 MG tablet, Take 20 mg by mouth 2 (Two) Times a Day., Disp: , Rfl:   •  fenofibrate 160 MG tablet, , Disp: , Rfl:   •  fluticasone (FLONASE) 50 MCG/ACT nasal spray, 2 sprays into the nostril(s) as directed by provider Daily., Disp: , Rfl:   •  latanoprost (XALATAN) 0.005 % ophthalmic solution, , Disp: , Rfl:   •  levothyroxine (SYNTHROID) 125 MCG tablet, SYNTHROID 125 MCG TABS, Disp: , Rfl:   •  Loratadine (Claritin) 10 MG capsule, Take  by mouth., Disp: , Rfl:   •  metFORMIN (GLUCOPHAGE) 500 MG tablet, Take 500 mg by mouth 2 (Two) Times a Day With Meals., Disp: , Rfl:   •  metoprolol tartrate (LOPRESSOR) 50 MG tablet, METOPROLOL TARTRATE TABS, Disp: , Rfl:   •  pravastatin (PRAVACHOL) 40 MG tablet, , Disp: , Rfl:   •  Empagliflozin (Jardiance) 10 MG tablet, Take 10 mg by mouth Daily., Disp: 30 tablet, Rfl: 4      Assessment/Plan   1.  Diabetes mellitus type 2: Uncontrolled with high A1c of 8.7%.  We talked about different options including SGLT2 blockers, DPP 4 inhibitors and GLP-1 agonist.  She is going to work on her diet.  Will add Jardiance 10 mg p.o. daily.  Side effects of UTI, yeast infection and dehydration discussed with her.  She is advised to call if she develops any itching or rash in the vaginal area or any burning in the urination.  She verbalized understanding.  She is also work on her diet.  Advised to get regular eye exam and flu vaccine.  2.  Hypertension: Check blood pressure at home and send readings for review  3.  Hyperlipidemia: Her LDL is 28 however triglycerides are high at 258.  She is on work on her diet.  She is to continue pravastatin and fenofibrate for now.  4.  Hypothyroidism:  Well-controlled with TSH 3.05 and free T4 1.74.  We will continue levothyroxine and follow labs.            Preeti Herrera MD FACE.

## 2020-06-08 ENCOUNTER — TELEPHONE (OUTPATIENT)
Dept: ENDOCRINOLOGY | Facility: CLINIC | Age: 70
End: 2020-06-08

## 2020-09-01 ENCOUNTER — LAB (OUTPATIENT)
Dept: LAB | Facility: HOSPITAL | Age: 70
End: 2020-09-01

## 2020-09-01 DIAGNOSIS — E11.65 UNCONTROLLED TYPE 2 DIABETES MELLITUS WITH HYPERGLYCEMIA (HCC): ICD-10-CM

## 2020-09-01 DIAGNOSIS — I10 ESSENTIAL HYPERTENSION: ICD-10-CM

## 2020-09-01 DIAGNOSIS — E03.9 ACQUIRED HYPOTHYROIDISM: ICD-10-CM

## 2020-09-01 DIAGNOSIS — E78.2 MIXED HYPERLIPIDEMIA: ICD-10-CM

## 2020-09-01 LAB
ALBUMIN SERPL-MCNC: 4.2 G/DL (ref 3.5–5.2)
ALBUMIN UR-MCNC: <1.2 MG/DL
ALBUMIN/GLOB SERPL: 1.6 G/DL
ALP SERPL-CCNC: 45 U/L (ref 39–117)
ALT SERPL W P-5'-P-CCNC: 25 U/L (ref 1–33)
ANION GAP SERPL CALCULATED.3IONS-SCNC: 11.3 MMOL/L (ref 5–15)
AST SERPL-CCNC: 18 U/L (ref 1–32)
BILIRUB SERPL-MCNC: 0.3 MG/DL (ref 0–1.2)
BUN SERPL-MCNC: 19 MG/DL (ref 8–23)
BUN/CREAT SERPL: 16 (ref 7–25)
CALCIUM SPEC-SCNC: 10.1 MG/DL (ref 8.6–10.5)
CHLORIDE SERPL-SCNC: 106 MMOL/L (ref 98–107)
CHOLEST SERPL-MCNC: 116 MG/DL (ref 0–200)
CO2 SERPL-SCNC: 24.7 MMOL/L (ref 22–29)
CREAT SERPL-MCNC: 1.19 MG/DL (ref 0.57–1)
CREAT UR-MCNC: 52.8 MG/DL
GFR SERPL CREATININE-BSD FRML MDRD: 45 ML/MIN/1.73
GLOBULIN UR ELPH-MCNC: 2.7 GM/DL
GLUCOSE SERPL-MCNC: 129 MG/DL (ref 65–99)
HBA1C MFR BLD: 7.7 % (ref 3.5–5.6)
HDLC SERPL-MCNC: 42 MG/DL (ref 40–60)
LDLC SERPL CALC-MCNC: 41 MG/DL (ref 0–100)
LDLC/HDLC SERPL: 0.98 {RATIO}
MICROALBUMIN/CREAT UR: NORMAL MG/G{CREAT}
POTASSIUM SERPL-SCNC: 4.3 MMOL/L (ref 3.5–5.2)
PROT SERPL-MCNC: 6.9 G/DL (ref 6–8.5)
SODIUM SERPL-SCNC: 142 MMOL/L (ref 136–145)
T4 FREE SERPL-MCNC: 1.9 NG/DL (ref 0.93–1.7)
TRIGL SERPL-MCNC: 164 MG/DL (ref 0–150)
TSH SERPL DL<=0.05 MIU/L-ACNC: 1.55 UIU/ML (ref 0.27–4.2)
VLDLC SERPL-MCNC: 32.8 MG/DL (ref 5–40)

## 2020-09-01 PROCEDURE — 80053 COMPREHEN METABOLIC PANEL: CPT

## 2020-09-01 PROCEDURE — 83036 HEMOGLOBIN GLYCOSYLATED A1C: CPT

## 2020-09-01 PROCEDURE — 84439 ASSAY OF FREE THYROXINE: CPT

## 2020-09-01 PROCEDURE — 82570 ASSAY OF URINE CREATININE: CPT

## 2020-09-01 PROCEDURE — 82043 UR ALBUMIN QUANTITATIVE: CPT

## 2020-09-01 PROCEDURE — 80061 LIPID PANEL: CPT

## 2020-09-01 PROCEDURE — 84443 ASSAY THYROID STIM HORMONE: CPT

## 2020-09-01 PROCEDURE — 36415 COLL VENOUS BLD VENIPUNCTURE: CPT

## 2020-09-08 ENCOUNTER — OFFICE VISIT (OUTPATIENT)
Dept: ENDOCRINOLOGY | Facility: CLINIC | Age: 70
End: 2020-09-08

## 2020-09-08 VITALS
OXYGEN SATURATION: 98 % | BODY MASS INDEX: 36.5 KG/M2 | HEART RATE: 90 BPM | TEMPERATURE: 97.1 F | DIASTOLIC BLOOD PRESSURE: 70 MMHG | SYSTOLIC BLOOD PRESSURE: 125 MMHG | HEIGHT: 63 IN | WEIGHT: 206 LBS

## 2020-09-08 DIAGNOSIS — E78.2 MIXED HYPERLIPIDEMIA: ICD-10-CM

## 2020-09-08 DIAGNOSIS — E11.65 UNCONTROLLED TYPE 2 DIABETES MELLITUS WITH HYPERGLYCEMIA (HCC): Primary | ICD-10-CM

## 2020-09-08 DIAGNOSIS — R73.9 HYPERGLYCEMIA: ICD-10-CM

## 2020-09-08 DIAGNOSIS — E03.9 ACQUIRED HYPOTHYROIDISM: ICD-10-CM

## 2020-09-08 DIAGNOSIS — I10 ESSENTIAL HYPERTENSION: ICD-10-CM

## 2020-09-08 LAB
GLUCOSE BLDC GLUCOMTR-MCNC: 106 MG/DL (ref 70–105)
GLUCOSE BLDC GLUCOMTR-MCNC: 106 MG/DL (ref 70–130)

## 2020-09-08 PROCEDURE — 99214 OFFICE O/P EST MOD 30 MIN: CPT | Performed by: INTERNAL MEDICINE

## 2020-09-08 PROCEDURE — 82962 GLUCOSE BLOOD TEST: CPT | Performed by: INTERNAL MEDICINE

## 2020-09-08 RX ORDER — METOPROLOL SUCCINATE 50 MG/1
TABLET, EXTENDED RELEASE ORAL
COMMUNITY
Start: 2020-08-03 | End: 2020-09-08

## 2020-09-08 RX ORDER — NITROFURANTOIN 25; 75 MG/1; MG/1
CAPSULE ORAL
COMMUNITY
Start: 2020-08-06 | End: 2020-09-08

## 2020-09-08 RX ORDER — SULFAMETHOXAZOLE AND TRIMETHOPRIM 800; 160 MG/1; MG/1
TABLET ORAL
COMMUNITY
Start: 2020-08-10 | End: 2020-09-08

## 2020-09-08 RX ORDER — NYSTATIN 100000 U/G
CREAM TOPICAL
COMMUNITY
Start: 2020-06-08 | End: 2020-09-08

## 2020-09-08 RX ORDER — GLIMEPIRIDE 2 MG/1
2 TABLET ORAL
COMMUNITY
End: 2021-03-08

## 2020-09-08 NOTE — PROGRESS NOTES
Endocrine Progress Note Outpatient     Patient Care Team:  Jose Brenner MD as PCP - General  Jose Brenner MD as PCP - Family Medicine  AldanaSanju soto MD as PCP - Claims Attributed       Chief Complaint: Follow-up type 2 diabetes    HPI: 70-year-old female with history of type 2 diabetes, hypertension, hyperlipidemia and hypothyroidism is here for follow-up.    For type 2 diabetes: She is currently on metformin 1000 mg twice a day, glimepiride 2 mg p.o. daily with breakfast.  She was prescribed Jardiance but could not tolerate it and stopped it as she developed some yeast infections and UTIs.  She has been through diabetes education however has not been following her diet very well.  She did send blood sugar records for review and they are running between 140 - 200+.  Denies any low blood sugars.  She is trying to work on her diet the best she can.  Hypertension: Well-controlled  Hypothyroidism: On levothyroxine supplementation  Hyperlipidemia: She is currently on pravastatin and fenofibrate.    Past Medical History:   Diagnosis Date   • Diverticulitis    • Hyperlipidemia    • Hypertension    • Type 2 diabetes mellitus (CMS/[facility])    • Uncontrolled type 2 diabetes mellitus with hyperglycemia (CMS/[facility]) 7/30/2019       Social History     Socioeconomic History   • Marital status:      Spouse name: Not on file   • Number of children: Not on file   • Years of education: Not on file   • Highest education level: Not on file   Tobacco Use   • Smoking status: Former Smoker   • Smokeless tobacco: Never Used   Substance and Sexual Activity   • Alcohol use: Yes     Frequency: Never     Comment: occasonally / social   • Drug use: Defer   • Sexual activity: Defer       Family History   Problem Relation Age of Onset   • Stroke Mother    • Cancer Father         prostate   • Heart disease Brother    • Cancer Brother         pranciritic   • Cancer Maternal Grandmother         colon   • Heart disease Paternal  Grandmother        Allergies   Allergen Reactions   • Amoxicillin-Pot Clavulanate Diarrhea   • Ciprofloxacin Myalgia       ROS:   Constitutional:  Denies fatigue, tiredness.    Eyes:  Denies change in visual acuity   HENT:  Denies nasal congestion or sore throat   Respiratory: denies cough, shortness of breath.   Cardiovascular:  denies chest pain, edema   GI:  Denies abdominal pain, nausea, vomiting.   Musculoskeletal:  Denies back pain or joint pain   Integument:  Denies dry skin and rash   Neurologic:  Denies headache, focal weakness or sensory changes   Endocrine:  Denies polyuria or polydipsia   Psychiatric:  Denies depression or anxiety      Vitals:    09/08/20 1102   BP: 125/70   Pulse: 90   Temp: 97.1 °F (36.2 °C)   SpO2: 98%       Physical Exam:  GEN: NAD, conversant, obese  EYES: EOMI, PERRL, no conjunctival erythema  NECK: no thyromegaly, full ROM   CV: RRR, no murmurs/rubs/gallops, no peripheral edema  LUNG: CTAB, no wheezes/rales/ronchi  SKIN: no rashes, no acanthosis  MSK: no deformities, full ROM of all extremities  NEURO: no tremors, DTR normal  PSYCH: AOX3, appropriate mood, affect normal      Results Review:     I reviewed the patient's new clinical results.    Lab Results   Component Value Date    HGBA1C 7.7 (H) 09/01/2020    HGBA1C 8.7 (H) 05/07/2020    HGBA1C 7.3 (H) 08/02/2019      Lab Results   Component Value Date    GLUCOSE 129 (H) 09/01/2020    BUN 19 09/01/2020    CREATININE 1.19 (H) 09/01/2020    EGFRIFNONA 45 (L) 09/01/2020    BCR 16.0 09/01/2020    K 4.3 09/01/2020    CO2 24.7 09/01/2020    CALCIUM 10.1 09/01/2020    ALBUMIN 4.20 09/01/2020    LABIL2 2.1 (H) 06/01/2019    AST 18 09/01/2020    ALT 25 09/01/2020    CHOL 116 09/01/2020    TRIG 164 (H) 09/01/2020    LDL 41 09/01/2020    HDL 42 09/01/2020     Lab Results   Component Value Date    TSH 1.550 09/01/2020    FREET4 1.90 (H) 09/01/2020         Medication Review: Reviewed.       Current Outpatient Medications:   •  LEONAU-ANGIE ALBERTO  PLUS test strip, USE TO TEST BLOOD SUGAR BID, Disp: , Rfl: 3  •  amLODIPine-benazepril (Lotrel) 5-20 MG per capsule, , Disp: , Rfl:   •  APPLE CIDER VINEGAR PO, Take  by mouth., Disp: , Rfl:   •  aspirin 81 MG EC tablet, Take 81 mg by mouth Daily., Disp: , Rfl:   •  Blood Glucose Monitoring Suppl (ACCU-CHEK DOLLY PLUS) w/Device kit, USE TO CHECK BLOOD SUGAR BID, Disp: , Rfl:   •  Cholecalciferol (VITAMIN D3) 50 MCG (2000 UT) capsule, TAKE 2,000 UNITS(1 CAPSULE) BY MOUTH DAILY, Disp: 100 capsule, Rfl: 1  •  cilostazol (PLETAL) 100 MG tablet, Take 100 mg by mouth 2 (Two) Times a Day., Disp: , Rfl: 5  •  Cyanocobalamin (B-12) 1000 MCG tablet controlled-release, DIS 1 T UNT D, Disp: , Rfl: 4  •  famotidine (PEPCID) 20 MG tablet, Take 20 mg by mouth 2 (Two) Times a Day., Disp: , Rfl:   •  fenofibrate 160 MG tablet, , Disp: , Rfl:   •  fluticasone (FLONASE) 50 MCG/ACT nasal spray, 2 sprays into the nostril(s) as directed by provider Daily., Disp: , Rfl:   •  glimepiride (AMARYL) 2 MG tablet, Take 2 mg by mouth Every Morning Before Breakfast., Disp: , Rfl:   •  latanoprost (XALATAN) 0.005 % ophthalmic solution, , Disp: , Rfl:   •  levothyroxine (SYNTHROID) 125 MCG tablet, SYNTHROID 125 MCG TABS, Disp: , Rfl:   •  Loratadine (Claritin) 10 MG capsule, Take  by mouth., Disp: , Rfl:   •  metFORMIN (GLUCOPHAGE) 500 MG tablet, Take 500 mg by mouth 2 (Two) Times a Day With Meals., Disp: , Rfl:   •  metoprolol tartrate (LOPRESSOR) 50 MG tablet, METOPROLOL TARTRATE TABS, Disp: , Rfl:   •  pravastatin (PRAVACHOL) 40 MG tablet, , Disp: , Rfl:       Assessment/Plan   1.  Diabetes mellitus type 2: Uncontrolled with high A1c of 7.7%.  Improving.  At this time I will add Ozempic 0.25 mg subcu once a week for at least 4 weeks and if she is able to tolerate that then she will increase the dose to 0.5 mg subcu weekly.  Side effects of abdominal pain with nausea and vomiting discussed with her if she is she develops any of the side effects  he will stop it and will let us know.  We will continue metformin and glimepiride.  She is also work on her diet.  Advised to get regular eye exam and flu vaccine.  2.  Hypertension: Well-controlled, continue current medications.  3.  Hyperlipidemia: Well-controlled with LDL 41 and triglycerides 164.  She is on work on her diet.  She is to continue pravastatin and fenofibrate for now.  4.  Hypothyroidism: Well-controlled with TSH 1.55.  We will continue levothyroxine and follow labs.            Preeti Herrera MD FACE.

## 2020-09-08 NOTE — PATIENT INSTRUCTIONS
Start Ozempic 0.25 mg subcu weekly for 4 weeks.  If you are able to tolerated then increase the dose to 0.5 mg subcu weekly after 4 weeks.  If you develop any abdominal pain, nausea or vomiting then stop Ozempic and call me  If the blood sugar starts running less than 100 then DC glimepiride  Continue to work on your diet and activity  Get annual eye exam  Follow-up in 6 months with labs.

## 2020-09-09 ENCOUNTER — TRANSCRIBE ORDERS (OUTPATIENT)
Dept: ADMINISTRATIVE | Facility: HOSPITAL | Age: 70
End: 2020-09-09

## 2020-09-09 DIAGNOSIS — I73.9 PERIPHERAL VASCULAR DISEASE, UNSPECIFIED (HCC): Primary | ICD-10-CM

## 2020-11-06 ENCOUNTER — HOSPITAL ENCOUNTER (OUTPATIENT)
Dept: CARDIOLOGY | Facility: HOSPITAL | Age: 70
Discharge: HOME | End: 2020-11-06
Admitting: PHYSICIAN ASSISTANT

## 2020-11-06 DIAGNOSIS — I73.9 PERIPHERAL VASCULAR DISEASE, UNSPECIFIED (HCC): ICD-10-CM

## 2020-11-06 LAB
BH CV LOWER ARTERIAL LEFT ABI RATIO: 0.76
BH CV LOWER ARTERIAL LEFT GREAT TOE SYS MAX: 96 MMHG
BH CV LOWER ARTERIAL LEFT LOW THIGH SYS MAX: 202 MMHG
BH CV LOWER ARTERIAL LEFT PERONEAL SYS MAX: 88 MMHG
BH CV LOWER ARTERIAL LEFT POPLITEAL SYS MAX: 115 MMHG
BH CV LOWER ARTERIAL LEFT POST TIBIAL SYS MAX: 110 MMHG
BH CV LOWER ARTERIAL LEFT TBI RATIO: 0.67
BH CV LOWER ARTERIAL RIGHT ABI RATIO: 1.11
BH CV LOWER ARTERIAL RIGHT LOW THIGH SYS MAX: 220 MMHG
BH CV LOWER ARTERIAL RIGHT PERONEAL SYS MAX: 160 MMHG
BH CV LOWER ARTERIAL RIGHT POPLITEAL SYS MAX: 163 MMHG
BH CV LOWER ARTERIAL RIGHT POST TIBIAL SYS MAX: 122 MMHG
UPPER ARTERIAL LEFT ARM BRACHIAL SYS MAX: 137 MMHG
UPPER ARTERIAL RIGHT ARM BRACHIAL SYS MAX: 144 MMHG

## 2020-11-06 PROCEDURE — 93923 UPR/LXTR ART STDY 3+ LVLS: CPT

## 2020-11-06 PROCEDURE — 93922 UPR/L XTREMITY ART 2 LEVELS: CPT

## 2020-11-11 ENCOUNTER — APPOINTMENT (OUTPATIENT)
Dept: VASCULAR SURGERY | Facility: HOSPITAL | Age: 70
End: 2020-11-11

## 2020-11-17 ENCOUNTER — APPOINTMENT (OUTPATIENT)
Dept: VASCULAR SURGERY | Facility: HOSPITAL | Age: 70
End: 2020-11-17

## 2020-11-17 PROCEDURE — G0463 HOSPITAL OUTPT CLINIC VISIT: HCPCS

## 2020-11-26 RX ORDER — SEMAGLUTIDE 1.34 MG/ML
INJECTION, SOLUTION SUBCUTANEOUS
Qty: 2 ML | Refills: 6 | Status: SHIPPED | OUTPATIENT
Start: 2020-11-26 | End: 2021-03-05 | Stop reason: SDUPTHER

## 2020-12-08 RX ORDER — ACETAMINOPHEN 160 MG
TABLET,DISINTEGRATING ORAL
Qty: 90 CAPSULE | Refills: 0 | Status: SHIPPED | OUTPATIENT
Start: 2020-12-08 | End: 2021-03-08 | Stop reason: SDUPTHER

## 2020-12-21 ENCOUNTER — TRANSCRIBE ORDERS (OUTPATIENT)
Dept: ADMINISTRATIVE | Facility: HOSPITAL | Age: 70
End: 2020-12-21

## 2020-12-21 DIAGNOSIS — I65.23 BILATERAL CAROTID ARTERY OCCLUSION: Primary | ICD-10-CM

## 2020-12-21 DIAGNOSIS — I73.9 PERIPHERAL VASCULAR DISEASE, UNSPECIFIED (HCC): ICD-10-CM

## 2021-01-03 PROCEDURE — 87086 URINE CULTURE/COLONY COUNT: CPT | Performed by: NURSE PRACTITIONER

## 2021-01-03 PROCEDURE — 87077 CULTURE AEROBIC IDENTIFY: CPT | Performed by: NURSE PRACTITIONER

## 2021-01-03 PROCEDURE — 87186 SC STD MICRODIL/AGAR DIL: CPT | Performed by: NURSE PRACTITIONER

## 2021-03-01 ENCOUNTER — LAB (OUTPATIENT)
Dept: LAB | Facility: HOSPITAL | Age: 71
End: 2021-03-01

## 2021-03-01 DIAGNOSIS — E03.9 ACQUIRED HYPOTHYROIDISM: ICD-10-CM

## 2021-03-01 DIAGNOSIS — E11.65 UNCONTROLLED TYPE 2 DIABETES MELLITUS WITH HYPERGLYCEMIA (HCC): ICD-10-CM

## 2021-03-01 LAB
ALBUMIN SERPL-MCNC: 4.4 G/DL (ref 3.5–5.2)
ALBUMIN/GLOB SERPL: 1.7 G/DL
ALP SERPL-CCNC: 51 U/L (ref 39–117)
ALT SERPL W P-5'-P-CCNC: 23 U/L (ref 1–33)
ANION GAP SERPL CALCULATED.3IONS-SCNC: 11 MMOL/L (ref 5–15)
AST SERPL-CCNC: 19 U/L (ref 1–32)
BILIRUB SERPL-MCNC: 0.3 MG/DL (ref 0–1.2)
BUN SERPL-MCNC: 15 MG/DL (ref 8–23)
BUN/CREAT SERPL: 13.5 (ref 7–25)
CALCIUM SPEC-SCNC: 9.6 MG/DL (ref 8.6–10.5)
CHLORIDE SERPL-SCNC: 105 MMOL/L (ref 98–107)
CHOLEST SERPL-MCNC: 102 MG/DL (ref 0–200)
CO2 SERPL-SCNC: 27 MMOL/L (ref 22–29)
CREAT SERPL-MCNC: 1.11 MG/DL (ref 0.57–1)
GFR SERPL CREATININE-BSD FRML MDRD: 48 ML/MIN/1.73
GLOBULIN UR ELPH-MCNC: 2.6 GM/DL
GLUCOSE SERPL-MCNC: 94 MG/DL (ref 65–99)
HBA1C MFR BLD: 6.1 % (ref 3.5–5.6)
HDLC SERPL-MCNC: 41 MG/DL (ref 40–60)
LDLC SERPL CALC-MCNC: 32 MG/DL (ref 0–100)
LDLC/HDLC SERPL: 0.61 {RATIO}
POTASSIUM SERPL-SCNC: 4.2 MMOL/L (ref 3.5–5.2)
PROT SERPL-MCNC: 7 G/DL (ref 6–8.5)
SODIUM SERPL-SCNC: 143 MMOL/L (ref 136–145)
T4 FREE SERPL-MCNC: 1.7 NG/DL (ref 0.93–1.7)
TRIGL SERPL-MCNC: 180 MG/DL (ref 0–150)
TSH SERPL DL<=0.05 MIU/L-ACNC: 1.28 UIU/ML (ref 0.27–4.2)
VLDLC SERPL-MCNC: 29 MG/DL (ref 5–40)

## 2021-03-01 PROCEDURE — 80061 LIPID PANEL: CPT

## 2021-03-01 PROCEDURE — 84443 ASSAY THYROID STIM HORMONE: CPT

## 2021-03-01 PROCEDURE — 36415 COLL VENOUS BLD VENIPUNCTURE: CPT

## 2021-03-01 PROCEDURE — 84439 ASSAY OF FREE THYROXINE: CPT

## 2021-03-01 PROCEDURE — 83036 HEMOGLOBIN GLYCOSYLATED A1C: CPT

## 2021-03-01 PROCEDURE — 80053 COMPREHEN METABOLIC PANEL: CPT

## 2021-03-06 RX ORDER — SEMAGLUTIDE 1.34 MG/ML
INJECTION, SOLUTION SUBCUTANEOUS
Qty: 6 ML | Refills: 2 | Status: SHIPPED | OUTPATIENT
Start: 2021-03-06 | End: 2021-03-08 | Stop reason: SDUPTHER

## 2021-03-08 ENCOUNTER — OFFICE VISIT (OUTPATIENT)
Dept: ENDOCRINOLOGY | Facility: CLINIC | Age: 71
End: 2021-03-08

## 2021-03-08 VITALS
TEMPERATURE: 97.1 F | WEIGHT: 199 LBS | DIASTOLIC BLOOD PRESSURE: 78 MMHG | SYSTOLIC BLOOD PRESSURE: 126 MMHG | HEART RATE: 100 BPM | BODY MASS INDEX: 35.25 KG/M2

## 2021-03-08 DIAGNOSIS — I10 ESSENTIAL HYPERTENSION: ICD-10-CM

## 2021-03-08 DIAGNOSIS — E78.2 MIXED HYPERLIPIDEMIA: ICD-10-CM

## 2021-03-08 DIAGNOSIS — E11.65 UNCONTROLLED TYPE 2 DIABETES MELLITUS WITH HYPERGLYCEMIA (HCC): Primary | ICD-10-CM

## 2021-03-08 DIAGNOSIS — E03.9 ACQUIRED HYPOTHYROIDISM: ICD-10-CM

## 2021-03-08 PROCEDURE — 99214 OFFICE O/P EST MOD 30 MIN: CPT | Performed by: INTERNAL MEDICINE

## 2021-03-08 RX ORDER — BIOTIN 10000 MCG
1 CAPSULE ORAL DAILY
COMMUNITY
End: 2021-09-03

## 2021-03-08 RX ORDER — ACETAMINOPHEN 160 MG
2000 TABLET,DISINTEGRATING ORAL DAILY
Qty: 90 CAPSULE | Refills: 6 | Status: SHIPPED | OUTPATIENT
Start: 2021-03-08 | End: 2022-08-08

## 2021-03-08 RX ORDER — SEMAGLUTIDE 1.34 MG/ML
INJECTION, SOLUTION SUBCUTANEOUS
Qty: 6 ML | Refills: 6 | Status: SHIPPED | OUTPATIENT
Start: 2021-03-08 | End: 2021-09-01 | Stop reason: SDUPTHER

## 2021-03-08 RX ORDER — AMLODIPINE BESYLATE AND BENAZEPRIL HYDROCHLORIDE 10; 20 MG/1; MG/1
1 CAPSULE ORAL DAILY
COMMUNITY
Start: 2021-02-20 | End: 2023-02-02 | Stop reason: HOSPADM

## 2021-03-08 RX ORDER — METOPROLOL SUCCINATE 50 MG/1
50 TABLET, EXTENDED RELEASE ORAL 2 TIMES DAILY
COMMUNITY
Start: 2021-02-19 | End: 2023-02-02 | Stop reason: HOSPADM

## 2021-03-08 RX ORDER — METFORMIN HYDROCHLORIDE 500 MG/1
TABLET, EXTENDED RELEASE ORAL
COMMUNITY
Start: 2021-01-13 | End: 2021-03-08

## 2021-03-08 NOTE — PATIENT INSTRUCTIONS
Continue current management  Always keep glucose source in case of low blood sugar  Annual eye exam and flu vaccine  Labs before follow-up.

## 2021-03-08 NOTE — PROGRESS NOTES
Endocrine Progress Note Outpatient     Patient Care Team:  Jose Brenner MD as PCP - General  Jose Brenner MD as PCP - Family Medicine       Chief Complaint: Follow-up type 2 diabetes    HPI: 70-year-old female with history of type 2 diabetes, hypertension, hyperlipidemia and hypothyroidism is here for follow-up.    For type 2 diabetes: She is currently on metformin 1000 mg twice a day, Ozempic 0.5 mg subcutaneous weekly She was prescribed Jardiance but could not tolerate it and stopped it as she developed some yeast infections and UTIs.  Since that she started Ozempic her blood sugars have improved significantly, she lost about 10 pounds of weight and to stop glimepiride.  She is trying to work on her diet the best she can.    Hypertension: Well-controlled    Hypothyroidism: On levothyroxine supplementation    Hyperlipidemia: She is currently on pravastatin and fenofibrate.    Past Medical History:   Diagnosis Date   • Diverticulitis    • Hyperlipidemia    • Hypertension    • Type 2 diabetes mellitus (CMS/[facility])    • Uncontrolled type 2 diabetes mellitus with hyperglycemia (CMS/[facility]) 7/30/2019       Social History     Socioeconomic History   • Marital status:      Spouse name: Not on file   • Number of children: Not on file   • Years of education: Not on file   • Highest education level: Not on file   Tobacco Use   • Smoking status: Former Smoker   • Smokeless tobacco: Never Used   Substance and Sexual Activity   • Alcohol use: Yes     Comment: occasonally / social   • Drug use: Defer   • Sexual activity: Defer       Family History   Problem Relation Age of Onset   • Stroke Mother    • Cancer Father         prostate   • Heart disease Brother    • Cancer Brother         pranciritic   • Cancer Maternal Grandmother         colon   • Heart disease Paternal Grandmother        Allergies   Allergen Reactions   • Amoxicillin-Pot Clavulanate Diarrhea   • Ciprofloxacin Myalgia       ROS:   Constitutional:  Denies  fatigue, tiredness.    Eyes:  Denies change in visual acuity   HENT:  Denies nasal congestion or sore throat   Respiratory: denies cough, shortness of breath.   Cardiovascular:  denies chest pain, edema   GI:  Denies abdominal pain, nausea, vomiting.   Musculoskeletal:  Denies back pain or joint pain   Integument:  Denies dry skin and rash   Neurologic:  Denies headache, focal weakness or sensory changes   Endocrine:  Denies polyuria or polydipsia   Psychiatric:  Denies depression or anxiety      Vitals:    03/08/21 1046   BP: 126/78   Pulse: 100   Temp: 97.1 °F (36.2 °C)       Physical Exam:  GEN: NAD, conversant, obese  EYES: EOMI, PERRL, no conjunctival erythema  NECK: no thyromegaly, full ROM   CV: RRR, no murmurs/rubs/gallops, no peripheral edema  LUNG: CTAB, no wheezes/rales/ronchi  SKIN: no rashes, no acanthosis  MSK: no deformities, full ROM of all extremities  NEURO: no tremors, DTR normal  PSYCH: AOX3, appropriate mood, affect normal      Results Review:     I reviewed the patient's new clinical results.    Lab Results   Component Value Date    HGBA1C 6.1 (H) 03/01/2021    HGBA1C 7.7 (H) 09/01/2020    HGBA1C 8.7 (H) 05/07/2020      Lab Results   Component Value Date    GLUCOSE 94 03/01/2021    BUN 15 03/01/2021    CREATININE 1.11 (H) 03/01/2021    EGFRIFNONA 48 (L) 03/01/2021    BCR 13.5 03/01/2021    K 4.2 03/01/2021    CO2 27.0 03/01/2021    CALCIUM 9.6 03/01/2021    ALBUMIN 4.40 03/01/2021    LABIL2 2.1 (H) 06/01/2019    AST 19 03/01/2021    ALT 23 03/01/2021    CHOL 102 03/01/2021    TRIG 180 (H) 03/01/2021    LDL 32 03/01/2021    HDL 41 03/01/2021     Lab Results   Component Value Date    TSH 1.280 03/01/2021    FREET4 1.70 03/01/2021         Medication Review: Reviewed.       Current Outpatient Medications:   •  ACCU-CHEK DOLLY PLUS test strip, USE TO TEST BLOOD SUGAR BID, Disp: , Rfl: 3  •  amLODIPine-benazepril (LOTREL) 10-20 MG per capsule, , Disp: , Rfl:   •  APPLE CIDER VINEGAR PO, Take  by  mouth., Disp: , Rfl:   •  Ascorbic Acid (DORINA-C PO), Take  by mouth., Disp: , Rfl:   •  aspirin 81 MG EC tablet, Take 81 mg by mouth Daily., Disp: , Rfl:   •  Biotin 10 MG capsule, Take  by mouth., Disp: , Rfl:   •  Blood Glucose Monitoring Suppl (ACCU-CHEK DOLLY PLUS) w/Device kit, USE TO CHECK BLOOD SUGAR BID, Disp: , Rfl:   •  Cholecalciferol (Vitamin D3) 50 MCG (2000 UT) capsule, TAKE ONE CAPSULE BY MOUTH DAILY, Disp: 90 capsule, Rfl: 0  •  cilostazol (PLETAL) 100 MG tablet, Take 100 mg by mouth 2 (Two) Times a Day., Disp: , Rfl: 5  •  Cyanocobalamin (B-12) 1000 MCG tablet controlled-release, DIS 1 T UNT D, Disp: , Rfl: 4  •  famotidine (PEPCID) 20 MG tablet, Take 20 mg by mouth 2 (Two) Times a Day., Disp: , Rfl:   •  fenofibrate 160 MG tablet, , Disp: , Rfl:   •  latanoprost (XALATAN) 0.005 % ophthalmic solution, , Disp: , Rfl:   •  levothyroxine (SYNTHROID) 125 MCG tablet, SYNTHROID 125 MCG TABS, Disp: , Rfl:   •  metFORMIN (GLUCOPHAGE) 500 MG tablet, Take 500 mg by mouth 2 (Two) Times a Day With Meals., Disp: , Rfl:   •  metoprolol succinate XL (TOPROL-XL) 50 MG 24 hr tablet, , Disp: , Rfl:   •  pravastatin (PRAVACHOL) 40 MG tablet, , Disp: , Rfl:   •  Semaglutide,0.25 or 0.5MG/DOS, (Ozempic, 0.25 or 0.5 MG/DOSE,) 2 MG/1.5ML solution pen-injector, 0.25 mg subcu weekly for 4 weeks then increase to 0.5 mg subcu weekly if tolerated., Disp: 6 mL, Rfl: 2      Assessment/Plan   1.  Diabetes mellitus type 2: Well controlled. CPM.  Advised to continue to work on diet and activity and always keep glucose source in case of low blood sugar.  Also get annual eye exam and flu vaccine.    2.  Hypertension: Well-controlled, continue current medications.    3.  Hyperlipidemia: Well-controlled with LDL of 32 and triglycerides 180.  She is on work on her diet.  She is to continue pravastatin and fenofibrate for now.    4.  Hypothyroidism: Well-controlled with TSH 1.28.  We will continue levothyroxine and follow labs.             Preeti Herrera MD FACE.

## 2021-04-08 ENCOUNTER — OFFICE (AMBULATORY)
Dept: URBAN - METROPOLITAN AREA CLINIC 64 | Facility: CLINIC | Age: 71
End: 2021-04-08

## 2021-04-08 VITALS
HEART RATE: 85 BPM | SYSTOLIC BLOOD PRESSURE: 112 MMHG | DIASTOLIC BLOOD PRESSURE: 61 MMHG | HEIGHT: 64 IN | WEIGHT: 192 LBS

## 2021-04-08 DIAGNOSIS — K22.70 BARRETT'S ESOPHAGUS WITHOUT DYSPLASIA: ICD-10-CM

## 2021-04-08 DIAGNOSIS — K21.9 GASTRO-ESOPHAGEAL REFLUX DISEASE WITHOUT ESOPHAGITIS: ICD-10-CM

## 2021-04-08 DIAGNOSIS — K57.32 DIVERTICULITIS OF LARGE INTESTINE WITHOUT PERFORATION OR ABS: ICD-10-CM

## 2021-04-08 DIAGNOSIS — R10.32 LEFT LOWER QUADRANT PAIN: ICD-10-CM

## 2021-04-08 PROCEDURE — 99213 OFFICE O/P EST LOW 20 MIN: CPT | Performed by: INTERNAL MEDICINE

## 2021-04-08 RX ORDER — SACCHAROMYCES BOULARDII 50 MG
500 CAPSULE ORAL
Qty: 60 | Refills: 1 | Status: COMPLETED
Start: 2021-04-08 | End: 2021-12-20

## 2021-04-08 RX ORDER — DOXYCYCLINE 100 MG/1
200 TABLET, FILM COATED ORAL
Qty: 20 | Refills: 0 | Status: COMPLETED
Start: 2021-04-08 | End: 2021-08-24

## 2021-05-19 ENCOUNTER — HOSPITAL ENCOUNTER (OUTPATIENT)
Dept: CARDIOLOGY | Facility: HOSPITAL | Age: 71
Discharge: HOME | End: 2021-05-19

## 2021-05-19 DIAGNOSIS — I65.23 BILATERAL CAROTID ARTERY OCCLUSION: ICD-10-CM

## 2021-05-19 DIAGNOSIS — I73.9 PERIPHERAL VASCULAR DISEASE, UNSPECIFIED (HCC): ICD-10-CM

## 2021-05-19 LAB
BH CV LOWER ARTERIAL LEFT ABI RATIO: 0.72
BH CV LOWER ARTERIAL LEFT DORSALIS PEDIS SYS MAX: 90 MMHG
BH CV LOWER ARTERIAL LEFT GREAT TOE SYS MAX: 90 MMHG
BH CV LOWER ARTERIAL LEFT LOW THIGH SYS MAX: 87 MMHG
BH CV LOWER ARTERIAL LEFT POPLITEAL SYS MAX: 85 MMHG
BH CV LOWER ARTERIAL LEFT POST TIBIAL SYS MAX: 88 MMHG
BH CV LOWER ARTERIAL RIGHT ABI RATIO: 1.04
BH CV LOWER ARTERIAL RIGHT DORSALIS PEDIS SYS MAX: 130 MMHG
BH CV LOWER ARTERIAL RIGHT GREAT TOE SYS MAX: 114 MMHG
BH CV LOWER ARTERIAL RIGHT LOW THIGH SYS MAX: 167 MMHG
BH CV LOWER ARTERIAL RIGHT POPLITEAL SYS MAX: 129 MMHG
BH CV LOWER ARTERIAL RIGHT POST TIBIAL SYS MAX: 120 MMHG
BH CV XLRA MEAS LEFT DIST CCA EDV: 11 CM/SEC
BH CV XLRA MEAS LEFT DIST CCA PSV: 46.6 CM/SEC
BH CV XLRA MEAS LEFT DIST ICA EDV: -15.5 CM/SEC
BH CV XLRA MEAS LEFT DIST ICA PSV: -48.9 CM/SEC
BH CV XLRA MEAS LEFT PROX CCA EDV: 13.7 CM/SEC
BH CV XLRA MEAS LEFT PROX CCA PSV: 90.7 CM/SEC
BH CV XLRA MEAS LEFT PROX ECA PSV: -67 CM/SEC
BH CV XLRA MEAS LEFT PROX ICA EDV: -10.3 CM/SEC
BH CV XLRA MEAS LEFT PROX ICA PSV: -27.3 CM/SEC
BH CV XLRA MEAS LEFT PROX SCLA PSV: 153 CM/SEC
BH CV XLRA MEAS LEFT VERTEBRAL A EDV: 13.3 CM/SEC
BH CV XLRA MEAS LEFT VERTEBRAL A PSV: 42 CM/SEC
BH CV XLRA MEAS RIGHT DIST CCA EDV: -8.4 CM/SEC
BH CV XLRA MEAS RIGHT DIST CCA PSV: -36.8 CM/SEC
BH CV XLRA MEAS RIGHT DIST ICA EDV: -22.5 CM/SEC
BH CV XLRA MEAS RIGHT DIST ICA PSV: -90 CM/SEC
BH CV XLRA MEAS RIGHT PROX CCA EDV: 11.9 CM/SEC
BH CV XLRA MEAS RIGHT PROX CCA PSV: 55.3 CM/SEC
BH CV XLRA MEAS RIGHT PROX ECA PSV: 55.3 CM/SEC
BH CV XLRA MEAS RIGHT PROX ICA EDV: -19.3 CM/SEC
BH CV XLRA MEAS RIGHT PROX ICA PSV: -63.7 CM/SEC
BH CV XLRA MEAS RIGHT PROX SCLA PSV: 155 CM/SEC
BH CV XLRA MEAS RIGHT VERTEBRAL A EDV: 11 CM/SEC
BH CV XLRA MEAS RIGHT VERTEBRAL A PSV: 43 CM/SEC
LEFT ARM BP: 124 MMHG
MAXIMAL PREDICTED HEART RATE: 148 BPM
MAXIMAL PREDICTED HEART RATE: 148 BPM
RIGHT ARM BP: 125 MMHG
STRESS TARGET HR: 126 BPM
STRESS TARGET HR: 126 BPM
UPPER ARTERIAL LEFT ARM BRACHIAL SYS MAX: 124 MMHG
UPPER ARTERIAL RIGHT ARM BRACHIAL SYS MAX: 125 MMHG

## 2021-05-19 PROCEDURE — 93880 EXTRACRANIAL BILAT STUDY: CPT

## 2021-05-19 PROCEDURE — 93923 UPR/LXTR ART STDY 3+ LVLS: CPT

## 2021-05-19 PROCEDURE — 93922 UPR/L XTREMITY ART 2 LEVELS: CPT

## 2021-05-26 ENCOUNTER — APPOINTMENT (OUTPATIENT)
Dept: VASCULAR SURGERY | Facility: HOSPITAL | Age: 71
End: 2021-05-26

## 2021-05-26 PROCEDURE — G0463 HOSPITAL OUTPT CLINIC VISIT: HCPCS

## 2021-07-05 ENCOUNTER — TRANSCRIBE ORDERS (OUTPATIENT)
Dept: ADMINISTRATIVE | Facility: HOSPITAL | Age: 71
End: 2021-07-05

## 2021-07-05 DIAGNOSIS — I73.9 PERIPHERAL VASCULAR DISEASE, UNSPECIFIED (HCC): Primary | ICD-10-CM

## 2021-08-06 ENCOUNTER — OFFICE VISIT (OUTPATIENT)
Dept: CARDIOLOGY | Facility: CLINIC | Age: 71
End: 2021-08-06

## 2021-08-06 VITALS
WEIGHT: 182 LBS | BODY MASS INDEX: 32.25 KG/M2 | HEART RATE: 85 BPM | SYSTOLIC BLOOD PRESSURE: 118 MMHG | HEIGHT: 63 IN | RESPIRATION RATE: 18 BRPM | OXYGEN SATURATION: 96 % | DIASTOLIC BLOOD PRESSURE: 78 MMHG

## 2021-08-06 DIAGNOSIS — Z01.818 PRE-OP TESTING: ICD-10-CM

## 2021-08-06 DIAGNOSIS — R06.09 EXERTIONAL DYSPNEA: ICD-10-CM

## 2021-08-06 DIAGNOSIS — I10 ESSENTIAL HYPERTENSION: ICD-10-CM

## 2021-08-06 DIAGNOSIS — I20.0 UNSTABLE ANGINA (HCC): Primary | ICD-10-CM

## 2021-08-06 DIAGNOSIS — R01.1 HEART MURMUR: ICD-10-CM

## 2021-08-06 DIAGNOSIS — E78.2 MIXED HYPERLIPIDEMIA: ICD-10-CM

## 2021-08-06 PROCEDURE — 99204 OFFICE O/P NEW MOD 45 MIN: CPT | Performed by: INTERNAL MEDICINE

## 2021-08-06 PROCEDURE — 93000 ELECTROCARDIOGRAM COMPLETE: CPT | Performed by: INTERNAL MEDICINE

## 2021-08-06 RX ORDER — NITROGLYCERIN 0.4 MG/1
TABLET SUBLINGUAL
Qty: 25 TABLET | Refills: 1 | Status: ON HOLD | OUTPATIENT
Start: 2021-08-06 | End: 2021-08-12

## 2021-08-06 RX ORDER — DULOXETIN HYDROCHLORIDE 60 MG/1
60 CAPSULE, DELAYED RELEASE ORAL DAILY
COMMUNITY

## 2021-08-06 NOTE — H&P (VIEW-ONLY)
Subjective:     Encounter Date:08/06/2021      Patient ID: Katty Ramirez is a 71 y.o. female.    Chief Complaint:  Chief Complaint   Patient presents with   • Establish Care   • Chest Pain   • Shortness of Breath       HPI:  Katty is a very pleasant patient of Dr. Brenner has a past medical history significant for coronary artery risk factors of diabetes mellitus type 2, atherogenic dyslipidemia hypertension and former tobacco use.  She also has reported peripheral vascular disease with most recent ABIs from 2021-05-19 showing normal right AZRA with moderate reduction in left AZRA consistent with tibial peroneal trunk disease.  Personally reviewed the most recent chest x-ray from 2017-04-06 which showed no active cardiopulmonary disease.  She also has a past medical history significant for gastroesophageal reflux disease and hypothyroidism.    I personally reviewed her past ECG from 2017-04-06 which shows normal sinus rhythm and otherwise normal tracing.  Today her ECG shows the same normal sinus rhythm with otherwise normal tracing save a single PVC.    She presents today with complaints of chest pain with shortness of air.  Her chest pain syndrome is that of genuine unstable angina.  Her chest pain is with exertion worsening with incline associated with acute onset shortness of air.  At night at the end of the day when she is attempting to do her ADLs, the chest pain comes on once again nightly radiating to her left arm and she describes it as feeling like someone is grabbing her from behind wrapping arms around her and squeezing her.  The only thing that makes it better is sitting down and resting.  When she is exerting herself it is relieved only by stopping waiting 5 minutes and then continuing.  This is new to her worsening within the past 6 weeks.      The following portions of the patient's history were reviewed and updated as appropriate: allergies, current medications, past family history, past medical  history, past social history, past surgical history and problem list.    Problem List:  Patient Active Problem List   Diagnosis   • Abdominal pain   • Acute bronchitis   • Chest discomfort   • Diverticulitis   • Dizziness   • Gastroesophageal reflux disease   • Headache   • Hip pain, right   • Mixed hyperlipidemia   • Essential hypertension   • Incisional hernia   • Influenza   • Nausea   • Otalgia, right   • Right calf pain   • Shortness of breath   • Sinusitis   • Surgical follow-up care   • Acquired hypothyroidism   • Thyroid disorder   • Uncontrolled type 2 diabetes mellitus with hyperglycemia (CMS/[facility])   • Diabetic peripheral neuropathy (CMS/[facility])   • Heart murmur   • Unstable angina (CMS/[facility])   • Exertional dyspnea       Past Medical History:  Past Medical History:   Diagnosis Date   • Diverticulitis    • Exertional dyspnea 8/6/2021   • Heart murmur    • Hyperlipidemia    • Hypertension    • Type 2 diabetes mellitus (CMS/[facility])    • Uncontrolled type 2 diabetes mellitus with hyperglycemia (CMS/[facility]) 7/30/2019   • Unstable angina (CMS/[facility]) 8/6/2021       Past Surgical History:  Past Surgical History:   Procedure Laterality Date   • CATARACT EXTRACTION  01/01/2014   • COLON RESECTION  01/01/2016   • HERNIA REPAIR  01/01/2016       Social History:  Social History     Socioeconomic History   • Marital status:      Spouse name: Not on file   • Number of children: Not on file   • Years of education: Not on file   • Highest education level: Not on file   Tobacco Use   • Smoking status: Former Smoker   • Smokeless tobacco: Never Used   Substance and Sexual Activity   • Alcohol use: Yes     Comment: occasonally / social   • Drug use: Defer   • Sexual activity: Defer       Allergies:  Allergies   Allergen Reactions   • Amoxicillin-Pot Clavulanate Diarrhea   • Ciprofloxacin Myalgia         Review of Symptoms:  Constitutional: Patient afebrile no chills or unexpected weight changes  Respiratory: No cough, no wheezing  "or dyspnea  Cardiovascular: Today the patient complains of no chest pain but did have chest pain last night as described in the HPI that resolved of its own volition, without palpitations, dyspnea, orthopnea and no edema  Gastrointestinal: No nausea, vomiting, constipation or diarrhea.  No melena or dark stools    All other systems reviewed and are negative             Objective:         /78 (BP Location: Left arm, Patient Position: Sitting, Cuff Size: Large Adult)   Pulse 85   Resp 18   Ht 160 cm (63\")   Wt 82.6 kg (182 lb)   SpO2 96%   BMI 32.24 kg/m²     Physical exam  Constitutional: well-nourished, and appears stated age in no acute distress  PERRL: Conjunctiva clear, no pallor, anicteric  HENMT: normocephalic, normal dentition, no cyanosis or pallor  Neck:no bruits, or thrills and bilateral normal carotid upstroke. Normal jugular venous pressure  Cardiovascular: No parasternal heaves an non-displaced focal PMI. Normal rate and rhythm: no rub, gallop, 1 out of 6 systolic ejection murmur early peaking and normal S1 and S2; no lower or upper extremity edema.   Lungs: unlabored, no wheezing with no rales or rhonchi on auscultation.  Extremities: Warm, no clubbing, cyanosis. Full and equal peripheral pulses in extremities with no bruits appreciated.   Abdomen: soft, non-tender, non-distended  Musculoskeletal: no joint tenderness or swelling and no erythema  Skin: Warm and dry, non-erythematous   Neuro:alert and normal affect. Oriented to time, place and person.       In-Office Procedure(s):    ECG 12 Lead    Date/Time: 8/6/2021 2:43 PM  Performed by: Eligio Abraham MD  Authorized by: Eligio Abraham MD   Comparison: not compared with previous ECG   Previous ECG: no previous ECG available  Rhythm: sinus rhythm  Comments: Normal tracing with PVC            ASCVD RIsk Score::  The ASCVD Risk score (Willard DC Jr., et al., 2013) failed to calculate for the following reasons:    The valid " total cholesterol range is 130 to 320 mg/dL    Recent Radiology:  Imaging Results (Most Recent)     None          Lab Review:   No visits with results within 2 Month(s) from this visit.   Latest known visit with results is:   Hospital Outpatient Visit on 05/19/2021   Component Date Value   • Target HR (85%) 05/19/2021 126    • Max. Pred. HR (100%) 05/19/2021 148    • RIGHT LOW THIGH SYS MAX 05/19/2021 167    • RIGHT POPLITEAL SYS MAX 05/19/2021 129    • RIGHT DORSALIS PEDIS SYS* 05/19/2021 130    • RIGHT POST TIBIAL SYS MAX 05/19/2021 120    • RIGHT GREAT TOE SYS MAX 05/19/2021 114    • RIGHT AZRA RATIO 05/19/2021 1.04    • LEFT LOW THIGH SYS MAX 05/19/2021 87    • LEFT POPLITEAL SYS MAX 05/19/2021 85    • LEFT DORSALIS PEDIS SYS * 05/19/2021 90    • LEFT POST TIBIAL SYS MAX 05/19/2021 88    • LEFT GREAT TOE SYS MAX 05/19/2021 90    • LEFT AZRA RATIO 05/19/2021 0.72    • Upper arterial right arm* 05/19/2021 125    • Upper arterial left arm * 05/19/2021 124               Invalid input(s): ALKPO4                        Invalid input(s): LDLCALC                Assessment:          Diagnosis Plan   1. Unstable angina (CMS/HCC)     2. Exertional dyspnea     3. Mixed hyperlipidemia     4. Essential hypertension     5. Heart murmur            Plan:         1. Unstable angina (CMS/HCC)  Chest pain syndrome consistent with unstable angina.  Plan for cardiac catheterization is soon as possible, next Thursday.  Patient was given nitroglycerin and informed that should she continue to have unrelenting chest pain despite nitroglycerin to come to the emergency room via EMS.    2. Exertional dyspnea  Anginal equivalent.    3. Mixed hyperlipidemia  Stable on medical therapy    4. Essential hypertension  Well-controlled on medical therapy    5. Heart murmur  Innocent systolic ejection murmur           Eligio Abraham MD  08/06/21  .

## 2021-08-06 NOTE — PROGRESS NOTES
Subjective:     Encounter Date:08/06/2021      Patient ID: Katty Ramirez is a 71 y.o. female.    Chief Complaint:  Chief Complaint   Patient presents with   • Establish Care   • Chest Pain   • Shortness of Breath       HPI:  Katty is a very pleasant patient of Dr. Brenner has a past medical history significant for coronary artery risk factors of diabetes mellitus type 2, atherogenic dyslipidemia hypertension and former tobacco use.  She also has reported peripheral vascular disease with most recent ABIs from 2021-05-19 showing normal right AZRA with moderate reduction in left AZRA consistent with tibial peroneal trunk disease.  Personally reviewed the most recent chest x-ray from 2017-04-06 which showed no active cardiopulmonary disease.  She also has a past medical history significant for gastroesophageal reflux disease and hypothyroidism.    I personally reviewed her past ECG from 2017-04-06 which shows normal sinus rhythm and otherwise normal tracing.  Today her ECG shows the same normal sinus rhythm with otherwise normal tracing save a single PVC.    She presents today with complaints of chest pain with shortness of air.  Her chest pain syndrome is that of genuine unstable angina.  Her chest pain is with exertion worsening with incline associated with acute onset shortness of air.  At night at the end of the day when she is attempting to do her ADLs, the chest pain comes on once again nightly radiating to her left arm and she describes it as feeling like someone is grabbing her from behind wrapping arms around her and squeezing her.  The only thing that makes it better is sitting down and resting.  When she is exerting herself it is relieved only by stopping waiting 5 minutes and then continuing.  This is new to her worsening within the past 6 weeks.      The following portions of the patient's history were reviewed and updated as appropriate: allergies, current medications, past family history, past medical  history, past social history, past surgical history and problem list.    Problem List:  Patient Active Problem List   Diagnosis   • Abdominal pain   • Acute bronchitis   • Chest discomfort   • Diverticulitis   • Dizziness   • Gastroesophageal reflux disease   • Headache   • Hip pain, right   • Mixed hyperlipidemia   • Essential hypertension   • Incisional hernia   • Influenza   • Nausea   • Otalgia, right   • Right calf pain   • Shortness of breath   • Sinusitis   • Surgical follow-up care   • Acquired hypothyroidism   • Thyroid disorder   • Uncontrolled type 2 diabetes mellitus with hyperglycemia (CMS/[facility])   • Diabetic peripheral neuropathy (CMS/[facility])   • Heart murmur   • Unstable angina (CMS/[facility])   • Exertional dyspnea       Past Medical History:  Past Medical History:   Diagnosis Date   • Diverticulitis    • Exertional dyspnea 8/6/2021   • Heart murmur    • Hyperlipidemia    • Hypertension    • Type 2 diabetes mellitus (CMS/[facility])    • Uncontrolled type 2 diabetes mellitus with hyperglycemia (CMS/[facility]) 7/30/2019   • Unstable angina (CMS/[facility]) 8/6/2021       Past Surgical History:  Past Surgical History:   Procedure Laterality Date   • CATARACT EXTRACTION  01/01/2014   • COLON RESECTION  01/01/2016   • HERNIA REPAIR  01/01/2016       Social History:  Social History     Socioeconomic History   • Marital status:      Spouse name: Not on file   • Number of children: Not on file   • Years of education: Not on file   • Highest education level: Not on file   Tobacco Use   • Smoking status: Former Smoker   • Smokeless tobacco: Never Used   Substance and Sexual Activity   • Alcohol use: Yes     Comment: occasonally / social   • Drug use: Defer   • Sexual activity: Defer       Allergies:  Allergies   Allergen Reactions   • Amoxicillin-Pot Clavulanate Diarrhea   • Ciprofloxacin Myalgia         Review of Symptoms:  Constitutional: Patient afebrile no chills or unexpected weight changes  Respiratory: No cough, no wheezing  "or dyspnea  Cardiovascular: Today the patient complains of no chest pain but did have chest pain last night as described in the HPI that resolved of its own volition, without palpitations, dyspnea, orthopnea and no edema  Gastrointestinal: No nausea, vomiting, constipation or diarrhea.  No melena or dark stools    All other systems reviewed and are negative             Objective:         /78 (BP Location: Left arm, Patient Position: Sitting, Cuff Size: Large Adult)   Pulse 85   Resp 18   Ht 160 cm (63\")   Wt 82.6 kg (182 lb)   SpO2 96%   BMI 32.24 kg/m²     Physical exam  Constitutional: well-nourished, and appears stated age in no acute distress  PERRL: Conjunctiva clear, no pallor, anicteric  HENMT: normocephalic, normal dentition, no cyanosis or pallor  Neck:no bruits, or thrills and bilateral normal carotid upstroke. Normal jugular venous pressure  Cardiovascular: No parasternal heaves an non-displaced focal PMI. Normal rate and rhythm: no rub, gallop, 1 out of 6 systolic ejection murmur early peaking and normal S1 and S2; no lower or upper extremity edema.   Lungs: unlabored, no wheezing with no rales or rhonchi on auscultation.  Extremities: Warm, no clubbing, cyanosis. Full and equal peripheral pulses in extremities with no bruits appreciated.   Abdomen: soft, non-tender, non-distended  Musculoskeletal: no joint tenderness or swelling and no erythema  Skin: Warm and dry, non-erythematous   Neuro:alert and normal affect. Oriented to time, place and person.       In-Office Procedure(s):    ECG 12 Lead    Date/Time: 8/6/2021 2:43 PM  Performed by: Eligio Abraham MD  Authorized by: Eligio Abraham MD   Comparison: not compared with previous ECG   Previous ECG: no previous ECG available  Rhythm: sinus rhythm  Comments: Normal tracing with PVC            ASCVD RIsk Score::  The ASCVD Risk score (Willard DC Jr., et al., 2013) failed to calculate for the following reasons:    The valid " total cholesterol range is 130 to 320 mg/dL    Recent Radiology:  Imaging Results (Most Recent)     None          Lab Review:   No visits with results within 2 Month(s) from this visit.   Latest known visit with results is:   Hospital Outpatient Visit on 05/19/2021   Component Date Value   • Target HR (85%) 05/19/2021 126    • Max. Pred. HR (100%) 05/19/2021 148    • RIGHT LOW THIGH SYS MAX 05/19/2021 167    • RIGHT POPLITEAL SYS MAX 05/19/2021 129    • RIGHT DORSALIS PEDIS SYS* 05/19/2021 130    • RIGHT POST TIBIAL SYS MAX 05/19/2021 120    • RIGHT GREAT TOE SYS MAX 05/19/2021 114    • RIGHT AZRA RATIO 05/19/2021 1.04    • LEFT LOW THIGH SYS MAX 05/19/2021 87    • LEFT POPLITEAL SYS MAX 05/19/2021 85    • LEFT DORSALIS PEDIS SYS * 05/19/2021 90    • LEFT POST TIBIAL SYS MAX 05/19/2021 88    • LEFT GREAT TOE SYS MAX 05/19/2021 90    • LEFT AZRA RATIO 05/19/2021 0.72    • Upper arterial right arm* 05/19/2021 125    • Upper arterial left arm * 05/19/2021 124               Invalid input(s): ALKPO4                        Invalid input(s): LDLCALC                Assessment:          Diagnosis Plan   1. Unstable angina (CMS/HCC)     2. Exertional dyspnea     3. Mixed hyperlipidemia     4. Essential hypertension     5. Heart murmur            Plan:         1. Unstable angina (CMS/HCC)  Chest pain syndrome consistent with unstable angina.  Plan for cardiac catheterization is soon as possible, next Thursday.  Patient was given nitroglycerin and informed that should she continue to have unrelenting chest pain despite nitroglycerin to come to the emergency room via EMS.    2. Exertional dyspnea  Anginal equivalent.    3. Mixed hyperlipidemia  Stable on medical therapy    4. Essential hypertension  Well-controlled on medical therapy    5. Heart murmur  Innocent systolic ejection murmur           Eligio Abraham MD  08/06/21  .

## 2021-08-10 ENCOUNTER — LAB (OUTPATIENT)
Dept: LAB | Facility: HOSPITAL | Age: 71
End: 2021-08-10

## 2021-08-10 DIAGNOSIS — I20.0 UNSTABLE ANGINA (HCC): ICD-10-CM

## 2021-08-10 DIAGNOSIS — Z01.818 PRE-OP TESTING: ICD-10-CM

## 2021-08-10 LAB
ALBUMIN SERPL-MCNC: 4.1 G/DL (ref 3.5–5.2)
ALBUMIN/GLOB SERPL: 1.5 G/DL
ALP SERPL-CCNC: 48 U/L (ref 39–117)
ALT SERPL W P-5'-P-CCNC: 16 U/L (ref 1–33)
ANION GAP SERPL CALCULATED.3IONS-SCNC: 12.6 MMOL/L (ref 5–15)
AST SERPL-CCNC: 16 U/L (ref 1–32)
BASOPHILS # BLD AUTO: 0.05 10*3/MM3 (ref 0–0.2)
BASOPHILS NFR BLD AUTO: 0.7 % (ref 0–1.5)
BILIRUB SERPL-MCNC: 0.3 MG/DL (ref 0–1.2)
BUN SERPL-MCNC: 24 MG/DL (ref 8–23)
BUN/CREAT SERPL: 16 (ref 7–25)
CALCIUM SPEC-SCNC: 10.2 MG/DL (ref 8.6–10.5)
CHLORIDE SERPL-SCNC: 106 MMOL/L (ref 98–107)
CO2 SERPL-SCNC: 23.4 MMOL/L (ref 22–29)
CREAT SERPL-MCNC: 1.5 MG/DL (ref 0.57–1)
DEPRECATED RDW RBC AUTO: 42.9 FL (ref 37–54)
EOSINOPHIL # BLD AUTO: 0.1 10*3/MM3 (ref 0–0.4)
EOSINOPHIL NFR BLD AUTO: 1.4 % (ref 0.3–6.2)
ERYTHROCYTE [DISTWIDTH] IN BLOOD BY AUTOMATED COUNT: 12.6 % (ref 12.3–15.4)
GFR SERPL CREATININE-BSD FRML MDRD: 34 ML/MIN/1.73
GLOBULIN UR ELPH-MCNC: 2.7 GM/DL
GLUCOSE SERPL-MCNC: 90 MG/DL (ref 65–99)
HCT VFR BLD AUTO: 37.9 % (ref 34–46.6)
HGB BLD-MCNC: 12.5 G/DL (ref 12–15.9)
IMM GRANULOCYTES # BLD AUTO: 0.03 10*3/MM3 (ref 0–0.05)
IMM GRANULOCYTES NFR BLD AUTO: 0.4 % (ref 0–0.5)
INR PPP: 1 (ref 0.93–1.1)
LYMPHOCYTES # BLD AUTO: 1.37 10*3/MM3 (ref 0.7–3.1)
LYMPHOCYTES NFR BLD AUTO: 18.8 % (ref 19.6–45.3)
MCH RBC QN AUTO: 31 PG (ref 26.6–33)
MCHC RBC AUTO-ENTMCNC: 33 G/DL (ref 31.5–35.7)
MCV RBC AUTO: 94 FL (ref 79–97)
MONOCYTES # BLD AUTO: 0.58 10*3/MM3 (ref 0.1–0.9)
MONOCYTES NFR BLD AUTO: 7.9 % (ref 5–12)
NEUTROPHILS NFR BLD AUTO: 5.17 10*3/MM3 (ref 1.7–7)
NEUTROPHILS NFR BLD AUTO: 70.8 % (ref 42.7–76)
NRBC BLD AUTO-RTO: 0 /100 WBC (ref 0–0.2)
PLATELET # BLD AUTO: 336 10*3/MM3 (ref 140–450)
PMV BLD AUTO: 10.1 FL (ref 6–12)
POTASSIUM SERPL-SCNC: 4.6 MMOL/L (ref 3.5–5.2)
PROT SERPL-MCNC: 6.8 G/DL (ref 6–8.5)
PROTHROMBIN TIME: 11.1 SECONDS (ref 9.6–11.7)
RBC # BLD AUTO: 4.03 10*6/MM3 (ref 3.77–5.28)
SODIUM SERPL-SCNC: 142 MMOL/L (ref 136–145)
WBC # BLD AUTO: 7.3 10*3/MM3 (ref 3.4–10.8)

## 2021-08-10 PROCEDURE — 80053 COMPREHEN METABOLIC PANEL: CPT

## 2021-08-10 PROCEDURE — 36415 COLL VENOUS BLD VENIPUNCTURE: CPT

## 2021-08-10 PROCEDURE — 85025 COMPLETE CBC W/AUTO DIFF WBC: CPT

## 2021-08-10 PROCEDURE — 85610 PROTHROMBIN TIME: CPT

## 2021-08-12 ENCOUNTER — HOSPITAL ENCOUNTER (OUTPATIENT)
Facility: HOSPITAL | Age: 71
Setting detail: HOSPITAL OUTPATIENT SURGERY
Discharge: HOME | End: 2021-08-12
Attending: INTERNAL MEDICINE | Admitting: INTERNAL MEDICINE

## 2021-08-12 VITALS
DIASTOLIC BLOOD PRESSURE: 61 MMHG | WEIGHT: 183.2 LBS | BODY MASS INDEX: 33.71 KG/M2 | TEMPERATURE: 98 F | RESPIRATION RATE: 15 BRPM | OXYGEN SATURATION: 96 % | HEART RATE: 87 BPM | HEIGHT: 62 IN | SYSTOLIC BLOOD PRESSURE: 116 MMHG

## 2021-08-12 DIAGNOSIS — I20.0 UNSTABLE ANGINA (HCC): ICD-10-CM

## 2021-08-12 LAB — GLUCOSE BLDC GLUCOMTR-MCNC: 74 MG/DL (ref 70–105)

## 2021-08-12 PROCEDURE — C1769 GUIDE WIRE: HCPCS | Performed by: INTERNAL MEDICINE

## 2021-08-12 PROCEDURE — 0 IOPAMIDOL PER 1 ML: Performed by: INTERNAL MEDICINE

## 2021-08-12 PROCEDURE — 99152 MOD SED SAME PHYS/QHP 5/>YRS: CPT | Performed by: INTERNAL MEDICINE

## 2021-08-12 PROCEDURE — C1760 CLOSURE DEV, VASC: HCPCS | Performed by: INTERNAL MEDICINE

## 2021-08-12 PROCEDURE — 25010000002 FENTANYL CITRATE (PF) 100 MCG/2ML SOLUTION: Performed by: INTERNAL MEDICINE

## 2021-08-12 PROCEDURE — 93458 L HRT ARTERY/VENTRICLE ANGIO: CPT | Performed by: INTERNAL MEDICINE

## 2021-08-12 PROCEDURE — C1894 INTRO/SHEATH, NON-LASER: HCPCS | Performed by: INTERNAL MEDICINE

## 2021-08-12 PROCEDURE — 25010000002 MIDAZOLAM PER 1 MG: Performed by: INTERNAL MEDICINE

## 2021-08-12 PROCEDURE — 82962 GLUCOSE BLOOD TEST: CPT

## 2021-08-12 RX ORDER — ACETAMINOPHEN 160 MG
2000 TABLET,DISINTEGRATING ORAL DAILY
COMMUNITY
End: 2021-09-03 | Stop reason: SDUPTHER

## 2021-08-12 RX ORDER — ACETAMINOPHEN 325 MG/1
650 TABLET ORAL EVERY 4 HOURS PRN
Status: DISCONTINUED | OUTPATIENT
Start: 2021-08-12 | End: 2021-08-12 | Stop reason: HOSPADM

## 2021-08-12 RX ORDER — NITROGLYCERIN 0.4 MG/1
0.4 TABLET SUBLINGUAL
COMMUNITY
End: 2022-10-12

## 2021-08-12 RX ORDER — FENTANYL CITRATE 50 UG/ML
INJECTION, SOLUTION INTRAMUSCULAR; INTRAVENOUS AS NEEDED
Status: DISCONTINUED | OUTPATIENT
Start: 2021-08-12 | End: 2021-08-12 | Stop reason: HOSPADM

## 2021-08-12 RX ORDER — NITROGLYCERIN 0.4 MG/1
0.4 TABLET SUBLINGUAL
Status: ON HOLD | COMMUNITY
End: 2021-08-12

## 2021-08-12 RX ORDER — SODIUM CHLORIDE 9 MG/ML
30 INJECTION, SOLUTION INTRAVENOUS CONTINUOUS
Status: DISCONTINUED | OUTPATIENT
Start: 2021-08-12 | End: 2021-08-12 | Stop reason: HOSPADM

## 2021-08-12 RX ORDER — MIDAZOLAM HYDROCHLORIDE 1 MG/ML
INJECTION INTRAMUSCULAR; INTRAVENOUS AS NEEDED
Status: DISCONTINUED | OUTPATIENT
Start: 2021-08-12 | End: 2021-08-12 | Stop reason: HOSPADM

## 2021-08-12 RX ORDER — LIDOCAINE HYDROCHLORIDE 20 MG/ML
INJECTION, SOLUTION INFILTRATION; PERINEURAL AS NEEDED
Status: DISCONTINUED | OUTPATIENT
Start: 2021-08-12 | End: 2021-08-12 | Stop reason: HOSPADM

## 2021-08-12 RX ORDER — SODIUM CHLORIDE 9 MG/ML
100 INJECTION, SOLUTION INTRAVENOUS CONTINUOUS
Status: DISCONTINUED | OUTPATIENT
Start: 2021-08-12 | End: 2021-08-12 | Stop reason: HOSPADM

## 2021-08-12 RX ADMIN — SODIUM CHLORIDE 30 ML/HR: 9 INJECTION, SOLUTION INTRAVENOUS at 13:28

## 2021-08-12 NOTE — CONSULTS
Acknowledge cardiac rehab evaluation. Does not meet criteria for phase 2 insurance coverage. No intervention. Thank you.

## 2021-08-24 ENCOUNTER — OFFICE (AMBULATORY)
Dept: URBAN - METROPOLITAN AREA CLINIC 64 | Facility: CLINIC | Age: 71
End: 2021-08-24

## 2021-08-24 VITALS
HEIGHT: 64 IN | WEIGHT: 178 LBS | DIASTOLIC BLOOD PRESSURE: 51 MMHG | HEART RATE: 79 BPM | SYSTOLIC BLOOD PRESSURE: 97 MMHG

## 2021-08-24 DIAGNOSIS — R50.9 FEVER, UNSPECIFIED: ICD-10-CM

## 2021-08-24 DIAGNOSIS — R10.32 LEFT LOWER QUADRANT PAIN: ICD-10-CM

## 2021-08-24 DIAGNOSIS — K57.92 DIVERTICULITIS OF INTESTINE, PART UNSPECIFIED, WITHOUT PERFO: ICD-10-CM

## 2021-08-24 DIAGNOSIS — R19.7 DIARRHEA, UNSPECIFIED: ICD-10-CM

## 2021-08-24 DIAGNOSIS — R11.0 NAUSEA: ICD-10-CM

## 2021-08-24 PROCEDURE — 99213 OFFICE O/P EST LOW 20 MIN: CPT | Performed by: NURSE PRACTITIONER

## 2021-08-24 RX ORDER — ONDANSETRON HYDROCHLORIDE 4 MG/1
12 TABLET, FILM COATED ORAL
Qty: 21 | Refills: 3 | Status: COMPLETED
Start: 2021-08-24 | End: 2022-04-11

## 2021-08-24 RX ORDER — LACTOBACIL 2/BIFIDO 1/S.THERMO 450B CELL
225 PACKET (EA) ORAL
Qty: 60 | Refills: 2 | Status: COMPLETED
Start: 2021-08-24 | End: 2022-04-11

## 2021-08-24 RX ORDER — DOXYCYCLINE 100 MG/1
200 CAPSULE ORAL
Qty: 28 | Refills: 0 | Status: COMPLETED
Start: 2021-08-24 | End: 2021-12-20

## 2021-08-30 ENCOUNTER — LAB (OUTPATIENT)
Dept: LAB | Facility: HOSPITAL | Age: 71
End: 2021-08-30

## 2021-08-30 DIAGNOSIS — E03.9 ACQUIRED HYPOTHYROIDISM: ICD-10-CM

## 2021-08-30 DIAGNOSIS — E11.65 UNCONTROLLED TYPE 2 DIABETES MELLITUS WITH HYPERGLYCEMIA (HCC): ICD-10-CM

## 2021-08-30 LAB
ALBUMIN SERPL-MCNC: 4.1 G/DL (ref 3.5–5.2)
ALBUMIN UR-MCNC: <1.2 MG/DL
ALBUMIN/GLOB SERPL: 1.6 G/DL
ALP SERPL-CCNC: 43 U/L (ref 39–117)
ALT SERPL W P-5'-P-CCNC: 15 U/L (ref 1–33)
ANION GAP SERPL CALCULATED.3IONS-SCNC: 7.8 MMOL/L (ref 5–15)
AST SERPL-CCNC: 14 U/L (ref 1–32)
BILIRUB SERPL-MCNC: 0.3 MG/DL (ref 0–1.2)
BUN SERPL-MCNC: 18 MG/DL (ref 8–23)
BUN/CREAT SERPL: 15.4 (ref 7–25)
CALCIUM SPEC-SCNC: 9.9 MG/DL (ref 8.6–10.5)
CHLORIDE SERPL-SCNC: 107 MMOL/L (ref 98–107)
CHOLEST SERPL-MCNC: 110 MG/DL (ref 0–200)
CO2 SERPL-SCNC: 25.2 MMOL/L (ref 22–29)
CREAT SERPL-MCNC: 1.17 MG/DL (ref 0.57–1)
CREAT UR-MCNC: 139.5 MG/DL
GFR SERPL CREATININE-BSD FRML MDRD: 45 ML/MIN/1.73
GLOBULIN UR ELPH-MCNC: 2.5 GM/DL
GLUCOSE SERPL-MCNC: 85 MG/DL (ref 65–99)
HBA1C MFR BLD: 5.9 % (ref 3.5–5.6)
HDLC SERPL-MCNC: 52 MG/DL (ref 40–60)
LDLC SERPL CALC-MCNC: 39 MG/DL (ref 0–100)
LDLC/HDLC SERPL: 0.73 {RATIO}
MICROALBUMIN/CREAT UR: NORMAL MG/G{CREAT}
POTASSIUM SERPL-SCNC: 4.4 MMOL/L (ref 3.5–5.2)
PROT SERPL-MCNC: 6.6 G/DL (ref 6–8.5)
SODIUM SERPL-SCNC: 140 MMOL/L (ref 136–145)
T4 FREE SERPL-MCNC: 2.04 NG/DL (ref 0.93–1.7)
TRIGL SERPL-MCNC: 99 MG/DL (ref 0–150)
TSH SERPL DL<=0.05 MIU/L-ACNC: 0.25 UIU/ML (ref 0.27–4.2)
VLDLC SERPL-MCNC: 19 MG/DL (ref 5–40)

## 2021-08-30 PROCEDURE — 36415 COLL VENOUS BLD VENIPUNCTURE: CPT

## 2021-08-30 PROCEDURE — 80053 COMPREHEN METABOLIC PANEL: CPT

## 2021-08-30 PROCEDURE — 82043 UR ALBUMIN QUANTITATIVE: CPT

## 2021-08-30 PROCEDURE — 82570 ASSAY OF URINE CREATININE: CPT

## 2021-08-30 PROCEDURE — 84443 ASSAY THYROID STIM HORMONE: CPT

## 2021-08-30 PROCEDURE — 84439 ASSAY OF FREE THYROXINE: CPT

## 2021-08-30 PROCEDURE — 80061 LIPID PANEL: CPT

## 2021-08-30 PROCEDURE — 83036 HEMOGLOBIN GLYCOSYLATED A1C: CPT

## 2021-08-30 RX ORDER — SULFAMETHOXAZOLE AND TRIMETHOPRIM 800; 160 MG/1; MG/1
TABLET ORAL
COMMUNITY
Start: 2021-08-09 | End: 2021-09-03

## 2021-08-30 RX ORDER — OXYBUTYNIN CHLORIDE 10 MG/1
10 TABLET, EXTENDED RELEASE ORAL DAILY
COMMUNITY
Start: 2021-08-09 | End: 2022-03-10

## 2021-08-30 RX ORDER — DULOXETIN HYDROCHLORIDE 30 MG/1
CAPSULE, DELAYED RELEASE ORAL
COMMUNITY
Start: 2021-06-04 | End: 2021-09-03

## 2021-08-30 RX ORDER — METFORMIN HYDROCHLORIDE 500 MG/1
500 TABLET, EXTENDED RELEASE ORAL 2 TIMES DAILY
COMMUNITY
Start: 2021-06-27 | End: 2021-09-03 | Stop reason: SDUPTHER

## 2021-09-01 DIAGNOSIS — E11.65 UNCONTROLLED TYPE 2 DIABETES MELLITUS WITH HYPERGLYCEMIA (HCC): Primary | ICD-10-CM

## 2021-09-01 RX ORDER — SEMAGLUTIDE 1.34 MG/ML
INJECTION, SOLUTION SUBCUTANEOUS
Qty: 6 ML | Refills: 6 | Status: SHIPPED | OUTPATIENT
Start: 2021-09-01 | End: 2022-09-13

## 2021-09-03 ENCOUNTER — OFFICE (AMBULATORY)
Dept: URBAN - METROPOLITAN AREA LAB 2 | Facility: LAB | Age: 71
End: 2021-09-03
Payer: MEDICARE

## 2021-09-03 ENCOUNTER — OFFICE VISIT (OUTPATIENT)
Dept: CARDIOLOGY | Facility: CLINIC | Age: 71
End: 2021-09-03

## 2021-09-03 VITALS
DIASTOLIC BLOOD PRESSURE: 72 MMHG | HEIGHT: 62 IN | HEART RATE: 82 BPM | WEIGHT: 180 LBS | SYSTOLIC BLOOD PRESSURE: 128 MMHG | BODY MASS INDEX: 33.13 KG/M2

## 2021-09-03 DIAGNOSIS — R07.89 CHEST DISCOMFORT: Primary | ICD-10-CM

## 2021-09-03 DIAGNOSIS — E78.2 MIXED HYPERLIPIDEMIA: ICD-10-CM

## 2021-09-03 DIAGNOSIS — K57.92 DIVERTICULITIS: ICD-10-CM

## 2021-09-03 DIAGNOSIS — A04.4 OTHER INTESTINAL ESCHERICHIA COLI INFECTIONS: ICD-10-CM

## 2021-09-03 DIAGNOSIS — R01.1 HEART MURMUR: ICD-10-CM

## 2021-09-03 DIAGNOSIS — R06.09 EXERTIONAL DYSPNEA: ICD-10-CM

## 2021-09-03 PROCEDURE — 87506 IADNA-DNA/RNA PROBE TQ 6-11: CPT | Performed by: INTERNAL MEDICINE

## 2021-09-03 PROCEDURE — 87507 IADNA-DNA/RNA PROBE TQ 12-25: CPT | Performed by: INTERNAL MEDICINE

## 2021-09-03 PROCEDURE — 99214 OFFICE O/P EST MOD 30 MIN: CPT | Performed by: INTERNAL MEDICINE

## 2021-09-03 RX ORDER — ONDANSETRON 8 MG/1
8 TABLET, ORALLY DISINTEGRATING ORAL EVERY 6 HOURS PRN
Status: SHIPPED | OUTPATIENT
Start: 2021-09-03

## 2021-09-03 NOTE — PROGRESS NOTES
Subjective:     Encounter Date:09/03/2021      Patient ID: Katty Ramirez is a 71 y.o. female.    Chief Complaint:  Chief Complaint   Patient presents with   • Unstable angina       HPI:  Katty is a very pleasant patient of Dr. Brenner has a past medical history significant for coronary artery risk factors of diabetes mellitus type 2, atherogenic dyslipidemia hypertension and former tobacco use.  She also has reported peripheral vascular disease with most recent ABIs from 2021-05-19 showing normal right AZRA with moderate reduction in left AZRA consistent with tibial peroneal trunk disease.  Personally reviewed the most recent chest x-ray from 2017-04-06 which showed no active cardiopulmonary disease.  She also has a past medical history significant for gastroesophageal reflux disease and hypothyroidism.    I personally reviewed her past ECG from 2017-04-06 which shows normal sinus rhythm and otherwise normal tracing.  Today her ECG shows the same normal sinus rhythm with otherwise normal tracing save a single PVC.    She presents today with complaints of chest pain with shortness of air. Her chest pain is with exertion worsening with incline associated with acute onset shortness of air.  At night at the end of the day when she is attempting to do her ADLs, the chest pain comes on once again nightly radiating to her left arm and she describes it as feeling like someone is grabbing her from behind wrapping arms around her and squeezing her.  The only thing that makes it better is sitting down and resting.  When she is exerting herself it is relieved only by stopping waiting 5 minutes and then continuing.  This is new to her worsening within the past 6 weeks.  However she underwent cardiac catheterization which showed normal coronary arteries and preserved LV systolic function.    She is back today for routine follow-up.  She is reporting midepigastric pain once again associated with nausea.  This could be  pancreatitis versus cholecystitis.      The following portions of the patient's history were reviewed and updated as appropriate: allergies, current medications, past family history, past medical history, past social history, past surgical history and problem list.    Problem List:  Patient Active Problem List   Diagnosis   • Abdominal pain   • Acute bronchitis   • Chest discomfort   • Diverticulitis   • Dizziness   • Gastroesophageal reflux disease   • Headache   • Hip pain, right   • Mixed hyperlipidemia   • Essential hypertension   • Incisional hernia   • Influenza   • Nausea   • Otalgia, right   • Right calf pain   • Shortness of breath   • Sinusitis   • Surgical follow-up care   • Acquired hypothyroidism   • Thyroid disorder   • Uncontrolled type 2 diabetes mellitus with hyperglycemia (CMS/[facility])   • Diabetic peripheral neuropathy (CMS/[facility])   • Heart murmur   • Exertional dyspnea       Past Medical History:  Past Medical History:   Diagnosis Date   • Disease of thyroid gland    • Diverticulitis    • Exertional dyspnea 8/6/2021   • Heart murmur    • Hyperlipidemia    • Hypertension    • Type 2 diabetes mellitus (CMS/[facility])    • Uncontrolled type 2 diabetes mellitus with hyperglycemia (CMS/[facility]) 7/30/2019   • Unstable angina (CMS/[facility]) 8/6/2021       Past Surgical History:  Past Surgical History:   Procedure Laterality Date   • CARDIAC CATHETERIZATION N/A 8/12/2021    Procedure: Left Heart Cath;  Surgeon: Eligio Abraham MD;  Location: [facility] CATH INVASIVE LOCATION;  Service: Cardiology;  Laterality: N/A;   • CATARACT EXTRACTION  01/01/2014   • COLON RESECTION  01/01/2016   • HERNIA REPAIR  01/01/2016       Social History:  Social History     Socioeconomic History   • Marital status:      Spouse name: Not on file   • Number of children: Not on file   • Years of education: Not on file   • Highest education level: Not on file   Tobacco Use   • Smoking status: Former Smoker   • Smokeless tobacco: Never  "Used   Substance and Sexual Activity   • Alcohol use: Yes     Comment: occasonally / social   • Drug use: Defer   • Sexual activity: Defer       Allergies:  Allergies   Allergen Reactions   • Amoxicillin-Pot Clavulanate Diarrhea   • Ciprofloxacin Myalgia           Review of Symptoms:  Constitutional: Patient afebrile no chills or unexpected weight changes  Respiratory: No cough, no wheezing or dyspnea  Cardiovascular: Today the patient complains of  chest pain, palpitations, dyspnea, orthopnea and no edema  Gastrointestinal: Nausea, no vomiting, constipation or diarrhea.  No melena or dark stools    All other systems reviewed and are negative           Objective:         /72   Pulse 82   Ht 157.5 cm (62\")   Wt 81.6 kg (180 lb)   BMI 32.92 kg/m²       Physical exam  Constitutional: well-nourished, and appears stated age in no acute distress  PERRL: Conjunctiva clear, no pallor, anicteric  HENMT: normocephalic, normal dentition, no cyanosis or pallor  Neck:no bruits, or thrills and bilateral normal carotid upstroke. Normal jugular venous pressure  Cardiovascular: No parasternal heaves an non-displaced focal PMI. Normal rate and rhythm: no rub, gallop, murmur or click and normal S1 and S2; no lower or upper extremity edema.   Lungs: unlabored, no wheezing with no rales or rhonchi on auscultation.  Extremities: Warm, no clubbing, cyanosis. Full and equal peripheral pulses in extremities with no bruits appreciated.   Abdomen: soft, tender in the epigastrium to deep palpation, non-distended  Musculoskeletal: no joint tenderness or swelling and no erythema  Skin: Warm and dry, non-erythematous   Neuro:alert and normal affect. Oriented to time, place and person.       In-Office Procedure(s):  Procedures    ASCVD RIsk Score::  The ASCVD Risk score (Willard ANNE MARIE Jr., et al., 2013) failed to calculate for the following reasons:    The valid total cholesterol range is 130 to 320 mg/dL    Recent Radiology:  Imaging Results " (Most Recent)     None          Lab Review:   Lab on 08/30/2021   Component Date Value   • Hemoglobin A1C 08/30/2021 5.9*   • Total Cholesterol 08/30/2021 110    • Triglycerides 08/30/2021 99    • HDL Cholesterol 08/30/2021 52    • LDL Cholesterol  08/30/2021 39    • VLDL Cholesterol 08/30/2021 19    • LDL/HDL Ratio 08/30/2021 0.73    • Glucose 08/30/2021 85    • BUN 08/30/2021 18    • Creatinine 08/30/2021 1.17*   • Sodium 08/30/2021 140    • Potassium 08/30/2021 4.4    • Chloride 08/30/2021 107    • CO2 08/30/2021 25.2    • Calcium 08/30/2021 9.9    • Total Protein 08/30/2021 6.6    • Albumin 08/30/2021 4.10    • ALT (SGPT) 08/30/2021 15    • AST (SGOT) 08/30/2021 14    • Alkaline Phosphatase 08/30/2021 43    • Total Bilirubin 08/30/2021 0.3    • eGFR Non African Amer 08/30/2021 45*   • Globulin 08/30/2021 2.5    • A/G Ratio 08/30/2021 1.6    • BUN/Creatinine Ratio 08/30/2021 15.4    • Anion Gap 08/30/2021 7.8    • Microalbumin/Creatinine * 08/30/2021     • Creatinine, Urine 08/30/2021 139.5    • Microalbumin, Urine 08/30/2021 <1.2    • TSH 08/30/2021 0.248*   • Free T4 08/30/2021 2.04*   Admission on 08/12/2021, Discharged on 08/12/2021   Component Date Value   • Glucose 08/12/2021 74    Lab on 08/10/2021   Component Date Value   • Glucose 08/10/2021 90    • BUN 08/10/2021 24*   • Creatinine 08/10/2021 1.50*   • Sodium 08/10/2021 142    • Potassium 08/10/2021 4.6    • Chloride 08/10/2021 106    • CO2 08/10/2021 23.4    • Calcium 08/10/2021 10.2    • Total Protein 08/10/2021 6.8    • Albumin 08/10/2021 4.10    • ALT (SGPT) 08/10/2021 16    • AST (SGOT) 08/10/2021 16    • Alkaline Phosphatase 08/10/2021 48    • Total Bilirubin 08/10/2021 0.3    • eGFR Non  Amer 08/10/2021 34*   • Globulin 08/10/2021 2.7    • A/G Ratio 08/10/2021 1.5    • BUN/Creatinine Ratio 08/10/2021 16.0    • Anion Gap 08/10/2021 12.6    • Protime 08/10/2021 11.1    • INR 08/10/2021 1.00    • WBC 08/10/2021 7.30    • RBC 08/10/2021 4.03     • Hemoglobin 08/10/2021 12.5    • Hematocrit 08/10/2021 37.9    • MCV 08/10/2021 94.0    • MCH 08/10/2021 31.0    • MCHC 08/10/2021 33.0    • RDW 08/10/2021 12.6    • RDW-SD 08/10/2021 42.9    • MPV 08/10/2021 10.1    • Platelets 08/10/2021 336    • Neutrophil % 08/10/2021 70.8    • Lymphocyte % 08/10/2021 18.8*   • Monocyte % 08/10/2021 7.9    • Eosinophil % 08/10/2021 1.4    • Basophil % 08/10/2021 0.7    • Immature Grans % 08/10/2021 0.4    • Neutrophils, Absolute 08/10/2021 5.17    • Lymphocytes, Absolute 08/10/2021 1.37    • Monocytes, Absolute 08/10/2021 0.58    • Eosinophils, Absolute 08/10/2021 0.10    • Basophils, Absolute 08/10/2021 0.05    • Immature Grans, Absolute 08/10/2021 0.03    • nRBC 08/10/2021 0.0         Results from last 7 days   Lab Units 08/30/21  0933   SODIUM mmol/L 140   POTASSIUM mmol/L 4.4   CHLORIDE mmol/L 107   CO2 mmol/L 25.2   BUN mg/dL 18   CREATININE mg/dL 1.17*   GLUCOSE mg/dL 85   CALCIUM mg/dL 9.9   AST (SGOT) U/L 14   ALT (SGPT) U/L 15                     Results from last 7 days   Lab Units 08/30/21  0933   CHOLESTEROL mg/dL 110   TRIGLYCERIDES mg/dL 99   HDL CHOL mg/dL 52         Results from last 7 days   Lab Units 08/30/21  0933   TSH uIU/mL 0.248*           Assessment:          Diagnosis Plan   1. Chest discomfort     2. Exertional dyspnea     3. Mixed hyperlipidemia     4. Heart murmur            Plan:         1. Chest discomfort  We will get a right upper quadrant ultrasound and prescribe her ondansetron.    2. Exertional dyspnea  Improved    3. Mixed hyperlipidemia  Stable    4. Heart murmur  Asymptomatic                 Eligio Abraham MD  09/03/21  .

## 2021-09-07 ENCOUNTER — OFFICE VISIT (OUTPATIENT)
Dept: ENDOCRINOLOGY | Facility: CLINIC | Age: 71
End: 2021-09-07

## 2021-09-07 VITALS
WEIGHT: 177 LBS | HEIGHT: 62 IN | DIASTOLIC BLOOD PRESSURE: 75 MMHG | TEMPERATURE: 97.5 F | SYSTOLIC BLOOD PRESSURE: 130 MMHG | HEART RATE: 80 BPM | BODY MASS INDEX: 32.57 KG/M2 | OXYGEN SATURATION: 97 %

## 2021-09-07 DIAGNOSIS — E78.2 MIXED HYPERLIPIDEMIA: ICD-10-CM

## 2021-09-07 DIAGNOSIS — E11.65 TYPE 2 DIABETES MELLITUS WITH HYPERGLYCEMIA, WITHOUT LONG-TERM CURRENT USE OF INSULIN (HCC): Primary | ICD-10-CM

## 2021-09-07 DIAGNOSIS — E03.9 ACQUIRED HYPOTHYROIDISM: ICD-10-CM

## 2021-09-07 DIAGNOSIS — I10 ESSENTIAL HYPERTENSION: ICD-10-CM

## 2021-09-07 LAB — GLUCOSE BLDC GLUCOMTR-MCNC: 82 MG/DL (ref 70–105)

## 2021-09-07 PROCEDURE — 82962 GLUCOSE BLOOD TEST: CPT | Performed by: INTERNAL MEDICINE

## 2021-09-07 PROCEDURE — 99214 OFFICE O/P EST MOD 30 MIN: CPT | Performed by: INTERNAL MEDICINE

## 2021-09-07 RX ORDER — ONDANSETRON 4 MG/1
TABLET, FILM COATED ORAL
COMMUNITY
Start: 2021-09-04 | End: 2022-10-12

## 2021-09-07 RX ORDER — METFORMIN HYDROCHLORIDE 500 MG/1
500 TABLET, EXTENDED RELEASE ORAL 2 TIMES DAILY
COMMUNITY
End: 2022-03-10

## 2021-09-07 RX ORDER — LEVOTHYROXINE SODIUM 0.1 MG/1
TABLET ORAL
Qty: 30 TABLET | Refills: 6 | Status: SHIPPED | OUTPATIENT
Start: 2021-09-07 | End: 2021-10-22 | Stop reason: SDUPTHER

## 2021-09-07 NOTE — PATIENT INSTRUCTIONS
Decrease levothyroxine to 100 mcg p.o. daily  Check TSH and free T4 in 6 weeks  Continue rest of the medications  Continue to work on your diet and activity  Rest of the labs before follow-up  Always keep glucose source in case of low blood sugar  Annual eye exam and flu vaccine.

## 2021-09-07 NOTE — PROGRESS NOTES
Endocrine Progress Note Outpatient     Patient Care Team:  Jose Brenner MD as PCP - General  Jose Brenner MD as PCP - Family Medicine  Eligio Abraham MD as Consulting Physician (Cardiology)  Preeti Herrera MD as Consulting Physician (Endocrinology)       Chief Complaint: Follow-up type 2 diabetes    HPI: 71-year-old female with history of type 2 diabetes, hypertension, hyperlipidemia and hypothyroidism is here for follow-up.    For type 2 diabetes: She is currently on metformin 1000 mg twice a day, Ozempic 0.5 mg subcutaneous weekly She was prescribed Jardiance but could not tolerate it and stopped it as she developed some yeast infections and UTIs.  Since that she started Ozempic her blood sugars have improved significantly, she lost about 10 pounds of weight and to stop glimepiride.  She is trying to work on her diet the best she can.    Hypertension: Well-controlled    Hypothyroidism: On levothyroxine supplementation    Hyperlipidemia: She is currently on pravastatin and fenofibrate.    Past Medical History:   Diagnosis Date   • Disease of thyroid gland    • Diverticulitis    • Exertional dyspnea 8/6/2021   • Heart murmur    • Hyperlipidemia    • Hypertension    • Type 2 diabetes mellitus (CMS/[facility])    • Uncontrolled type 2 diabetes mellitus with hyperglycemia (CMS/[facility]) 7/30/2019   • Unstable angina (CMS/[facility]) 8/6/2021       Social History     Socioeconomic History   • Marital status:      Spouse name: Not on file   • Number of children: Not on file   • Years of education: Not on file   • Highest education level: Not on file   Tobacco Use   • Smoking status: Former Smoker   • Smokeless tobacco: Never Used   Vaping Use   • Vaping Use: Never used   Substance and Sexual Activity   • Alcohol use: Yes     Comment: occasonally / social   • Drug use: Defer   • Sexual activity: Defer       Family History   Problem Relation Age of Onset   • Stroke Mother    • Cancer Father         prostate   •  Heart disease Brother    • Cancer Brother         pranciritic   • Cancer Maternal Grandmother         colon   • Heart disease Paternal Grandmother        Allergies   Allergen Reactions   • Amoxicillin-Pot Clavulanate Diarrhea   • Ciprofloxacin Myalgia       ROS:   Constitutional:  Admit fatigue, tiredness.    Eyes:  Denies change in visual acuity   HENT:  Denies nasal congestion or sore throat   Respiratory: denies cough, admit shortness of breath.   Cardiovascular:  denies chest pain, edema   GI:  Denies abdominal pain, nausea, vomiting.   Musculoskeletal:  Denies back pain or joint pain   Integument:  Denies dry skin and rash   Neurologic:  Denies headache, focal weakness or sensory changes   Endocrine:  Denies polyuria or polydipsia   Psychiatric:  Denies depression or anxiety      Vitals:    09/07/21 1010   BP: 130/75   Pulse: 80   Temp: 97.5 °F (36.4 °C)   SpO2: 97%     BMI: 32.4    Physical Exam:  GEN: NAD, conversant, obese  EYES: EOMI, PERRL, no conjunctival erythema  NECK: no thyromegaly, full ROM   CV: RRR, no murmurs/rubs/gallops, no peripheral edema  LUNG: CTAB, no wheezes/rales/ronchi  SKIN: no rashes, no acanthosis  MSK: no deformities, full ROM of all extremities  NEURO: no tremors, DTR normal  PSYCH: AOX3, appropriate mood, affect normal      Results Review:     I reviewed the patient's new clinical results.    Lab Results   Component Value Date    HGBA1C 5.9 (H) 08/30/2021    HGBA1C 6.1 (H) 03/01/2021    HGBA1C 7.7 (H) 09/01/2020      Lab Results   Component Value Date    GLUCOSE 85 08/30/2021    BUN 18 08/30/2021    CREATININE 1.17 (H) 08/30/2021    EGFRIFNONA 45 (L) 08/30/2021    BCR 15.4 08/30/2021    K 4.4 08/30/2021    CO2 25.2 08/30/2021    CALCIUM 9.9 08/30/2021    ALBUMIN 4.10 08/30/2021    LABIL2 2.1 (H) 06/01/2019    AST 14 08/30/2021    ALT 15 08/30/2021    CHOL 110 08/30/2021    TRIG 99 08/30/2021    LDL 39 08/30/2021    HDL 52 08/30/2021     Lab Results   Component Value Date    TSH  0.248 (L) 08/30/2021    FREET4 2.04 (H) 08/30/2021         Medication Review: Reviewed.       Current Outpatient Medications:   •  ACCU-CHEK DOLLY PLUS test strip, USE TO TEST BLOOD SUGAR BID, Disp: , Rfl: 3  •  amLODIPine-benazepril (LOTREL) 10-20 MG per capsule, Take 1 capsule by mouth Daily., Disp: , Rfl:   •  aspirin 81 MG EC tablet, Take 81 mg by mouth Daily., Disp: , Rfl:   •  Blood Glucose Monitoring Suppl (ACCU-CHEK DOLLY PLUS) w/Device kit, USE TO CHECK BLOOD SUGAR BID, Disp: , Rfl:   •  Cholecalciferol (Vitamin D3) 50 MCG (2000 UT) capsule, Take 1 capsule by mouth Daily., Disp: 90 capsule, Rfl: 6  •  cilostazol (PLETAL) 100 MG tablet, Take 100 mg by mouth 2 (Two) Times a Day., Disp: , Rfl: 5  •  Cyanocobalamin (B-12) 1000 MCG tablet controlled-release, Take 1 tablet by mouth Daily., Disp: , Rfl: 4  •  DULoxetine (CYMBALTA) 60 MG capsule, Take 60 mg by mouth Daily., Disp: , Rfl:   •  famotidine (PEPCID) 20 MG tablet, Take 20 mg by mouth 2 (Two) Times a Day., Disp: , Rfl:   •  fenofibrate 160 MG tablet, Take 160 mg by mouth Daily., Disp: , Rfl:   •  latanoprost (XALATAN) 0.005 % ophthalmic solution, Administer 1 drop to both eyes Every Night., Disp: , Rfl:   •  levothyroxine (SYNTHROID) 125 MCG tablet, Take 125 mcg by mouth Daily., Disp: , Rfl:   •  metFORMIN ER (GLUCOPHAGE-XR) 500 MG 24 hr tablet, Take 500 mg by mouth 2 (two) times a day., Disp: , Rfl:   •  metoprolol succinate XL (TOPROL-XL) 50 MG 24 hr tablet, Take 50 mg by mouth Daily., Disp: , Rfl:   •  nitroglycerin (NITROSTAT) 0.4 MG SL tablet, Place 0.4 mg under the tongue Every 5 (Five) Minutes As Needed for Chest Pain. Take no more than 3 doses in 15 minutes., Disp: , Rfl:   •  ondansetron (ZOFRAN) 4 MG tablet, , Disp: , Rfl:   •  oxybutynin XL (DITROPAN-XL) 10 MG 24 hr tablet, Take 10 mg by mouth Daily., Disp: , Rfl:   •  pravastatin (PRAVACHOL) 40 MG tablet, Take 40 mg by mouth Every Night., Disp: , Rfl:   •  Semaglutide,0.25 or 0.5MG/DOS,  (Ozempic, 0.25 or 0.5 MG/DOSE,) 2 MG/1.5ML solution pen-injector, Inject 0.5mg weekly, Disp: 6 mL, Rfl: 6    Current Facility-Administered Medications:   •  ondansetron ODT (ZOFRAN-ODT) disintegrating tablet 8 mg, 8 mg, Oral, Q6H PRN, Eligio Abraham MD      Assessment/Plan   1.  Diabetes mellitus type 2: Well controlled.  We will continue Metformin and Ozempic for now.  Advised to continue to work on diet and activity and always keep glucose source in case of low blood sugar.  Also get annual eye exam and flu vaccine.  She has lost 22 pounds since last seen.  Ozempic has helped her tremendously with weight loss.    2.  Hypertension: Well-controlled, continue current medications.    3.  Hyperlipidemia: Well-controlled with LDL of 39 and triglycerides 99.  She is on work on her diet.  She is to continue pravastatin and fenofibrate for now.    4.  Hypothyroidism: Uncontrolled with TSH of 0.24 and free T4 2.04, will reduce levothyroxine to 100 mcg p.o. daily and follow labs.            Preeti Herrera MD FACE.

## 2021-09-14 ENCOUNTER — OFFICE (AMBULATORY)
Dept: URBAN - METROPOLITAN AREA CLINIC 64 | Facility: CLINIC | Age: 71
End: 2021-09-14

## 2021-09-14 VITALS
DIASTOLIC BLOOD PRESSURE: 64 MMHG | SYSTOLIC BLOOD PRESSURE: 117 MMHG | HEIGHT: 64 IN | WEIGHT: 176 LBS | HEART RATE: 92 BPM

## 2021-09-14 DIAGNOSIS — A04.4 OTHER INTESTINAL ESCHERICHIA COLI INFECTIONS: ICD-10-CM

## 2021-09-14 DIAGNOSIS — K22.70 BARRETT'S ESOPHAGUS WITHOUT DYSPLASIA: ICD-10-CM

## 2021-09-14 PROCEDURE — 99212 OFFICE O/P EST SF 10 MIN: CPT | Performed by: NURSE PRACTITIONER

## 2021-10-19 ENCOUNTER — LAB (OUTPATIENT)
Dept: LAB | Facility: HOSPITAL | Age: 71
End: 2021-10-19

## 2021-10-19 DIAGNOSIS — E11.65 TYPE 2 DIABETES MELLITUS WITH HYPERGLYCEMIA, WITHOUT LONG-TERM CURRENT USE OF INSULIN (HCC): ICD-10-CM

## 2021-10-19 DIAGNOSIS — E03.9 ACQUIRED HYPOTHYROIDISM: ICD-10-CM

## 2021-10-19 LAB
T4 FREE SERPL-MCNC: 1.59 NG/DL (ref 0.93–1.7)
TSH SERPL DL<=0.05 MIU/L-ACNC: 2.4 UIU/ML (ref 0.27–4.2)

## 2021-10-19 PROCEDURE — 84443 ASSAY THYROID STIM HORMONE: CPT

## 2021-10-19 PROCEDURE — 84439 ASSAY OF FREE THYROXINE: CPT

## 2021-10-19 PROCEDURE — 36415 COLL VENOUS BLD VENIPUNCTURE: CPT

## 2021-10-22 RX ORDER — LEVOTHYROXINE SODIUM 0.1 MG/1
TABLET ORAL
Qty: 90 TABLET | Refills: 3 | Status: SHIPPED | OUTPATIENT
Start: 2021-10-22 | End: 2022-08-31

## 2021-11-23 ENCOUNTER — HOSPITAL ENCOUNTER (OUTPATIENT)
Dept: CARDIOLOGY | Facility: HOSPITAL | Age: 71
Discharge: HOME | End: 2021-11-23
Admitting: PHYSICIAN ASSISTANT

## 2021-11-23 DIAGNOSIS — I73.9 PERIPHERAL VASCULAR DISEASE, UNSPECIFIED (HCC): ICD-10-CM

## 2021-11-23 LAB
BH CV LOWER ARTERIAL LEFT ABI RATIO: 0.76
BH CV LOWER ARTERIAL LEFT CALF RATIO: 0.88
BH CV LOWER ARTERIAL LEFT DORSALIS PEDIS SYS MAX: 81 MMHG
BH CV LOWER ARTERIAL LEFT GREAT TOE SYS MAX: 73 MMHG
BH CV LOWER ARTERIAL LEFT LOW THIGH SYS MAX: 157 MMHG
BH CV LOWER ARTERIAL LEFT POPLITEAL SYS MAX: 112 MMHG
BH CV LOWER ARTERIAL LEFT POST TIBIAL SYS MAX: 96 MMHG
BH CV LOWER ARTERIAL LEFT TBI RATIO: 0.57
BH CV LOWER ARTERIAL RIGHT ABI RATIO: 1.13
BH CV LOWER ARTERIAL RIGHT CALF RATIO: 1.1
BH CV LOWER ARTERIAL RIGHT DORSALIS PEDIS SYS MAX: 143 MMHG
BH CV LOWER ARTERIAL RIGHT GREAT TOE SYS MAX: 90 MMHG
BH CV LOWER ARTERIAL RIGHT LOW THIGH SYS MAX: 167 MMHG
BH CV LOWER ARTERIAL RIGHT POPLITEAL SYS MAX: 140 MMHG
BH CV LOWER ARTERIAL RIGHT POST TIBIAL SYS MAX: 139 MMHG
BH CV LOWER ARTERIAL RIGHT TBI RATIO: 0.71
MAXIMAL PREDICTED HEART RATE: 148 BPM
STRESS TARGET HR: 126 BPM
UPPER ARTERIAL LEFT ARM BRACHIAL SYS MAX: 124 MMHG
UPPER ARTERIAL RIGHT ARM BRACHIAL SYS MAX: 127 MMHG

## 2021-11-23 PROCEDURE — 93923 UPR/LXTR ART STDY 3+ LVLS: CPT

## 2021-11-30 ENCOUNTER — APPOINTMENT (OUTPATIENT)
Dept: VASCULAR SURGERY | Facility: HOSPITAL | Age: 71
End: 2021-11-30

## 2021-11-30 PROCEDURE — G0463 HOSPITAL OUTPT CLINIC VISIT: HCPCS

## 2021-12-21 ENCOUNTER — OFFICE (AMBULATORY)
Dept: URBAN - METROPOLITAN AREA PATHOLOGY 4 | Facility: PATHOLOGY | Age: 71
End: 2021-12-21

## 2021-12-21 ENCOUNTER — ON CAMPUS - OUTPATIENT (AMBULATORY)
Dept: URBAN - METROPOLITAN AREA HOSPITAL 2 | Facility: HOSPITAL | Age: 71
End: 2021-12-21

## 2021-12-21 VITALS
RESPIRATION RATE: 16 BRPM | RESPIRATION RATE: 18 BRPM | HEART RATE: 90 BPM | RESPIRATION RATE: 15 BRPM | OXYGEN SATURATION: 97 % | OXYGEN SATURATION: 95 % | DIASTOLIC BLOOD PRESSURE: 73 MMHG | WEIGHT: 170 LBS | HEART RATE: 99 BPM | HEART RATE: 82 BPM | DIASTOLIC BLOOD PRESSURE: 75 MMHG | SYSTOLIC BLOOD PRESSURE: 132 MMHG | SYSTOLIC BLOOD PRESSURE: 133 MMHG | HEIGHT: 64 IN | SYSTOLIC BLOOD PRESSURE: 131 MMHG | RESPIRATION RATE: 17 BRPM | TEMPERATURE: 97.7 F | SYSTOLIC BLOOD PRESSURE: 122 MMHG | DIASTOLIC BLOOD PRESSURE: 74 MMHG | SYSTOLIC BLOOD PRESSURE: 125 MMHG | HEART RATE: 83 BPM | DIASTOLIC BLOOD PRESSURE: 69 MMHG | SYSTOLIC BLOOD PRESSURE: 109 MMHG | OXYGEN SATURATION: 99 % | HEART RATE: 76 BPM | OXYGEN SATURATION: 96 % | HEART RATE: 85 BPM | DIASTOLIC BLOOD PRESSURE: 72 MMHG

## 2021-12-21 DIAGNOSIS — K22.70 BARRETT'S ESOPHAGUS WITHOUT DYSPLASIA: ICD-10-CM

## 2021-12-21 LAB
GI HISTOLOGY: A. UNSPECIFIED: (no result)
GI HISTOLOGY: PDF REPORT: (no result)

## 2021-12-21 PROCEDURE — 43239 EGD BIOPSY SINGLE/MULTIPLE: CPT | Performed by: INTERNAL MEDICINE

## 2021-12-21 PROCEDURE — 88305 TISSUE EXAM BY PATHOLOGIST: CPT | Mod: 26 | Performed by: INTERNAL MEDICINE

## 2021-12-27 ENCOUNTER — TRANSCRIBE ORDERS (OUTPATIENT)
Dept: ADMINISTRATIVE | Facility: HOSPITAL | Age: 71
End: 2021-12-27

## 2021-12-27 DIAGNOSIS — I73.9 PERIPHERAL VASCULAR DISEASE, UNSPECIFIED (HCC): Primary | ICD-10-CM

## 2021-12-27 DIAGNOSIS — I65.23 BILATERAL CAROTID ARTERY OCCLUSION: ICD-10-CM

## 2021-12-27 DIAGNOSIS — I71.40 ABDOMINAL AORTIC ANEURYSM WITHOUT RUPTURE (HCC): ICD-10-CM

## 2022-02-21 ENCOUNTER — APPOINTMENT (OUTPATIENT)
Dept: GENERAL RADIOLOGY | Facility: HOSPITAL | Age: 72
End: 2022-02-21

## 2022-02-21 PROCEDURE — 99283 EMERGENCY DEPT VISIT LOW MDM: CPT

## 2022-02-21 PROCEDURE — 72050 X-RAY EXAM NECK SPINE 4/5VWS: CPT

## 2022-02-21 PROCEDURE — 36415 COLL VENOUS BLD VENIPUNCTURE: CPT

## 2022-02-22 ENCOUNTER — HOSPITAL ENCOUNTER (EMERGENCY)
Facility: HOSPITAL | Age: 72
Discharge: HOME | End: 2022-02-22
Attending: EMERGENCY MEDICINE | Admitting: EMERGENCY MEDICINE

## 2022-02-22 ENCOUNTER — APPOINTMENT (OUTPATIENT)
Dept: CT IMAGING | Facility: HOSPITAL | Age: 72
End: 2022-02-22

## 2022-02-22 VITALS
RESPIRATION RATE: 16 BRPM | TEMPERATURE: 97.7 F | OXYGEN SATURATION: 95 % | DIASTOLIC BLOOD PRESSURE: 66 MMHG | HEART RATE: 62 BPM | WEIGHT: 181 LBS | SYSTOLIC BLOOD PRESSURE: 110 MMHG | HEIGHT: 63 IN | BODY MASS INDEX: 32.07 KG/M2

## 2022-02-22 DIAGNOSIS — M54.2 NECK PAIN: Primary | ICD-10-CM

## 2022-02-22 DIAGNOSIS — R51.9 ACUTE NONINTRACTABLE HEADACHE, UNSPECIFIED HEADACHE TYPE: ICD-10-CM

## 2022-02-22 LAB
ALBUMIN SERPL-MCNC: 4 G/DL (ref 3.5–5.2)
ALBUMIN/GLOB SERPL: 1.7 G/DL
ALP SERPL-CCNC: 55 U/L (ref 39–117)
ALT SERPL W P-5'-P-CCNC: 13 U/L (ref 1–33)
ANION GAP SERPL CALCULATED.3IONS-SCNC: 10 MMOL/L (ref 5–15)
AST SERPL-CCNC: 14 U/L (ref 1–32)
BASOPHILS # BLD AUTO: 0 10*3/MM3 (ref 0–0.2)
BASOPHILS NFR BLD AUTO: 0.5 % (ref 0–1.5)
BILIRUB SERPL-MCNC: 0.4 MG/DL (ref 0–1.2)
BUN SERPL-MCNC: 20 MG/DL (ref 8–23)
BUN/CREAT SERPL: 17.7 (ref 7–25)
CALCIUM SPEC-SCNC: 9.6 MG/DL (ref 8.6–10.5)
CHLORIDE SERPL-SCNC: 106 MMOL/L (ref 98–107)
CO2 SERPL-SCNC: 24 MMOL/L (ref 22–29)
CREAT SERPL-MCNC: 1.13 MG/DL (ref 0.57–1)
CRP SERPL-MCNC: 0.45 MG/DL (ref 0–0.5)
DEPRECATED RDW RBC AUTO: 44.6 FL (ref 37–54)
EOSINOPHIL # BLD AUTO: 0.1 10*3/MM3 (ref 0–0.4)
EOSINOPHIL NFR BLD AUTO: 2 % (ref 0.3–6.2)
ERYTHROCYTE [DISTWIDTH] IN BLOOD BY AUTOMATED COUNT: 13.6 % (ref 12.3–15.4)
ERYTHROCYTE [SEDIMENTATION RATE] IN BLOOD: 6 MM/HR (ref 0–30)
GFR SERPL CREATININE-BSD FRML MDRD: 47 ML/MIN/1.73
GLOBULIN UR ELPH-MCNC: 2.4 GM/DL
GLUCOSE SERPL-MCNC: 97 MG/DL (ref 65–99)
HCT VFR BLD AUTO: 37 % (ref 34–46.6)
HGB BLD-MCNC: 12.4 G/DL (ref 12–15.9)
LYMPHOCYTES # BLD AUTO: 1.2 10*3/MM3 (ref 0.7–3.1)
LYMPHOCYTES NFR BLD AUTO: 21.1 % (ref 19.6–45.3)
MCH RBC QN AUTO: 31.6 PG (ref 26.6–33)
MCHC RBC AUTO-ENTMCNC: 33.6 G/DL (ref 31.5–35.7)
MCV RBC AUTO: 94 FL (ref 79–97)
MONOCYTES # BLD AUTO: 0.7 10*3/MM3 (ref 0.1–0.9)
MONOCYTES NFR BLD AUTO: 11.5 % (ref 5–12)
NEUTROPHILS NFR BLD AUTO: 3.7 10*3/MM3 (ref 1.7–7)
NEUTROPHILS NFR BLD AUTO: 64.9 % (ref 42.7–76)
NRBC BLD AUTO-RTO: 0.1 /100 WBC (ref 0–0.2)
PLATELET # BLD AUTO: 280 10*3/MM3 (ref 140–450)
PMV BLD AUTO: 7.2 FL (ref 6–12)
POTASSIUM SERPL-SCNC: 4 MMOL/L (ref 3.5–5.2)
PROT SERPL-MCNC: 6.4 G/DL (ref 6–8.5)
RBC # BLD AUTO: 3.93 10*6/MM3 (ref 3.77–5.28)
SODIUM SERPL-SCNC: 140 MMOL/L (ref 136–145)
WBC NRBC COR # BLD: 5.6 10*3/MM3 (ref 3.4–10.8)

## 2022-02-22 PROCEDURE — 80053 COMPREHEN METABOLIC PANEL: CPT | Performed by: NURSE PRACTITIONER

## 2022-02-22 PROCEDURE — 86140 C-REACTIVE PROTEIN: CPT | Performed by: NURSE PRACTITIONER

## 2022-02-22 PROCEDURE — 85025 COMPLETE CBC W/AUTO DIFF WBC: CPT | Performed by: NURSE PRACTITIONER

## 2022-02-22 PROCEDURE — 85652 RBC SED RATE AUTOMATED: CPT | Performed by: NURSE PRACTITIONER

## 2022-02-22 PROCEDURE — 72125 CT NECK SPINE W/O DYE: CPT

## 2022-02-22 PROCEDURE — 70450 CT HEAD/BRAIN W/O DYE: CPT

## 2022-02-22 RX ORDER — LIDOCAINE 50 MG/G
1 PATCH TOPICAL EVERY 24 HOURS
Qty: 6 PATCH | Refills: 0 | Status: SHIPPED | OUTPATIENT
Start: 2022-02-22 | End: 2022-10-12

## 2022-02-22 RX ORDER — TIZANIDINE 4 MG/1
4 TABLET ORAL EVERY 8 HOURS PRN
Status: DISCONTINUED | OUTPATIENT
Start: 2022-02-22 | End: 2022-02-22 | Stop reason: HOSPADM

## 2022-02-22 RX ORDER — SODIUM CHLORIDE 0.9 % (FLUSH) 0.9 %
10 SYRINGE (ML) INJECTION AS NEEDED
Status: DISCONTINUED | OUTPATIENT
Start: 2022-02-22 | End: 2022-02-22 | Stop reason: HOSPADM

## 2022-02-22 RX ORDER — TIZANIDINE 2 MG/1
2 TABLET ORAL EVERY 8 HOURS PRN
Qty: 10 TABLET | Refills: 0 | Status: SHIPPED | OUTPATIENT
Start: 2022-02-22 | End: 2022-10-12

## 2022-02-22 RX ADMIN — TIZANIDINE 4 MG: 4 TABLET ORAL at 02:22

## 2022-02-22 NOTE — ED PROVIDER NOTES
Subjective    Chief Complaint   Patient presents with   • Neck Pain     Jose Brenner MD  No LMP recorded. Patient is postmenopausal.  Allergies   Allergen Reactions   • Amoxicillin-Pot Clavulanate Diarrhea   • Ciprofloxacin Myalgia       Patient is a 71-year-old female presents emergency department with complaint of neck pain onset today at 10 AM.  Patient denies any direct trauma, injury or falls.  No fever chills.  Patient does report headache that goes up from her neck in the back of her head.  Denies any speech disturbances.  No visual disturbances.  No unilateral weakness, facial droop.  Context: As above  Onset: Today  Location: Posterior neck  Duration: Consistent  Character: Aching  Aggravating: Worsened with movement  Alleviating Factors: Improved with ice and aspirin  Radiation: Up to head  Treatments Tried: Heat, ice and aspirin            Review of Systems   Constitutional: Negative for chills and fever.   Eyes: Negative for photophobia and visual disturbance.   Respiratory: Negative for shortness of breath.    Cardiovascular: Negative for chest pain.   Gastrointestinal: Negative for abdominal pain.   Musculoskeletal: Positive for neck pain. Negative for back pain.   Skin: Negative for rash.   Neurological: Positive for headaches. Negative for dizziness, tremors, seizures, syncope, facial asymmetry, speech difficulty, weakness, light-headedness and numbness.       Past Medical History:   Diagnosis Date   • Disease of thyroid gland    • Diverticulitis    • Exertional dyspnea 8/6/2021   • Heart murmur    • Hyperlipidemia    • Hypertension    • Type 2 diabetes mellitus (CMS/[facility])    • Uncontrolled type 2 diabetes mellitus with hyperglycemia (CMS/[facility]) 7/30/2019   • Unstable angina (CMS/[facility]) 8/6/2021       Allergies   Allergen Reactions   • Amoxicillin-Pot Clavulanate Diarrhea   • Ciprofloxacin Myalgia       Past Surgical History:   Procedure Laterality Date   • CARDIAC CATHETERIZATION N/A 8/12/2021     Procedure: Left Heart Cath;  Surgeon: Eligio Abraham MD;  Location: [facility] CATH INVASIVE LOCATION;  Service: Cardiology;  Laterality: N/A;   • CATARACT EXTRACTION  01/01/2014   • COLON RESECTION  01/01/2016   • HERNIA REPAIR  01/01/2016       Family History   Problem Relation Age of Onset   • Stroke Mother    • Cancer Father         prostate   • Heart disease Brother    • Cancer Brother         pranciritic   • Cancer Maternal Grandmother         colon   • Heart disease Paternal Grandmother        Social History     Socioeconomic History   • Marital status:    Tobacco Use   • Smoking status: Former Smoker   • Smokeless tobacco: Never Used   Vaping Use   • Vaping Use: Never used   Substance and Sexual Activity   • Alcohol use: Yes     Comment: occasonally / social   • Drug use: Defer   • Sexual activity: Defer           Objective   Physical Exam  Vitals and nursing note reviewed.   Constitutional:       General: She is not in acute distress.     Appearance: Normal appearance. She is not ill-appearing, toxic-appearing or diaphoretic.   HENT:      Head: Normocephalic and atraumatic.      Nose: Nose normal.      Mouth/Throat:      Mouth: Mucous membranes are moist.      Pharynx: Oropharynx is clear.   Eyes:      Extraocular Movements: Extraocular movements intact.      Conjunctiva/sclera: Conjunctivae normal.      Pupils: Pupils are equal, round, and reactive to light.   Neck:      Trachea: Trachea and phonation normal.      Meningeal: Brudzinski's sign absent.   Cardiovascular:      Rate and Rhythm: Normal rate and regular rhythm.      Heart sounds: Normal heart sounds. No murmur heard.  No friction rub. No gallop.    Pulmonary:      Effort: Pulmonary effort is normal.      Breath sounds: Normal breath sounds.   Abdominal:      General: Bowel sounds are normal.      Palpations: Abdomen is soft.      Tenderness: There is no abdominal tenderness. There is no guarding or rebound.   Musculoskeletal:        "  General: Normal range of motion.      Cervical back: Normal range of motion and neck supple. No edema, erythema, signs of trauma, rigidity or crepitus. Pain with movement and muscular tenderness present. No spinous process tenderness. Normal range of motion.   Skin:     General: Skin is warm and dry.      Capillary Refill: Capillary refill takes less than 2 seconds.      Findings: No erythema or rash.   Neurological:      Mental Status: She is alert and oriented to person, place, and time.      Cranial Nerves: No dysarthria or facial asymmetry.      Sensory: Sensation is intact.      Motor: No weakness or pronator drift.      Deep Tendon Reflexes:      Reflex Scores:       Brachioradialis reflexes are 2+ on the right side and 2+ on the left side.     Comments: Upper extremity strength out of 5 equal.   Psychiatric:         Mood and Affect: Mood normal.         Behavior: Behavior normal.         Procedures           ED Course                /66   Pulse 62   Temp 97.7 °F (36.5 °C) (Oral)   Resp 16   Ht 160 cm (63\")   Wt 82.1 kg (181 lb)   SpO2 95%   BMI 32.06 kg/m²   Labs Reviewed   COMPREHENSIVE METABOLIC PANEL - Abnormal; Notable for the following components:       Result Value    Creatinine 1.13 (*)     eGFR Non  Amer 47 (*)     All other components within normal limits    Narrative:     GFR Normal >60  Chronic Kidney Disease <60  Kidney Failure <15     SEDIMENTATION RATE - Normal   C-REACTIVE PROTEIN - Normal   CBC WITH AUTO DIFFERENTIAL - Normal   CBC AND DIFFERENTIAL    Narrative:     The following orders were created for panel order CBC & Differential.  Procedure                               Abnormality         Status                     ---------                               -----------         ------                     CBC Auto Differential[[id number]]        Normal              Final result                 Please view results for these tests on the individual orders.     Medications "   sodium chloride 0.9 % flush 10 mL (has no administration in time range)   tiZANidine (ZANAFLEX) tablet 4 mg (4 mg Oral Given 2/22/22 0222)     XR Spine Cervical Complete 4 or 5 View    Result Date: 2/21/2022   1. Extensive multilevel degenerative change has progressed since 2010 predominantly with bony hypertrophy causing varying degrees of moderate to severe bony neural foraminal narrowing. No acute fractures or malalignment. 2. There is suggestion of smooth prevertebral soft tissue thickening which is a change since the 2010 study. Cross-sectional imaging would be better able to evaluate the soft tissues.  Electronically Signed By-Emanuel Nunn DO On:2/21/2022 11:15 PM This report was finalized on [id number] by  Emanuel Nunn DO.    CT Head Without Contrast    Result Date: 2/22/2022  Head CT: 1.  No acute intracranial abnormality. 2.  Age-related volume loss. Mild chronic small vessel ischemic changes. Cervical spine CT: 1.  No acute fracture or malalignment in the cervical spine. 2.  Degenerative changes of the cervical spine as discussed above. Electronically signed by:  Aris Ernandez  2/22/2022 12:18 AM    CT Cervical Spine Without Contrast    Result Date: 2/22/2022  Head CT: 1.  No acute intracranial abnormality. 2.  Age-related volume loss. Mild chronic small vessel ischemic changes. Cervical spine CT: 1.  No acute fracture or malalignment in the cervical spine. 2.  Degenerative changes of the cervical spine as discussed above. Electronically signed by:  Aris Ernandez  2/22/2022 12:18 AM                     CT Cervical Spine Without Contrast   Final Result      Head CT:       1.  No acute intracranial abnormality.   2.  Age-related volume loss. Mild chronic small vessel ischemic changes.         Cervical spine CT:      1.  No acute fracture or malalignment in the cervical spine.    2.  Degenerative changes of the cervical spine as discussed above.            Electronically signed by:  Aris Ernandez      2/22/2022 12:18 AM      CT Head Without Contrast   Final Result      Head CT:       1.  No acute intracranial abnormality.   2.  Age-related volume loss. Mild chronic small vessel ischemic changes.         Cervical spine CT:      1.  No acute fracture or malalignment in the cervical spine.    2.  Degenerative changes of the cervical spine as discussed above.            Electronically signed by:  Aris Ernandez     2/22/2022 12:18 AM      XR Spine Cervical Complete 4 or 5 View   Final Result       1. Extensive multilevel degenerative change has progressed since 2010   predominantly with bony hypertrophy causing varying degrees of moderate   to severe bony neural foraminal narrowing. No acute fractures or   malalignment.   2. There is suggestion of smooth prevertebral soft tissue thickening   which is a change since the 2010 study. Cross-sectional imaging would be   better able to evaluate the soft tissues.       Electronically Signed By-Emanuel Nunn DO On:2/21/2022 11:15 PM   This report was finalized on [id number] by  Emanuel Nunn DO.                        MDM  Appropriate PPE was worn during the duration of the care for this patient while in the emergency department per [facility] Policy    Differentials--> torticollis, strain, arthritis, fracture  This list is not all inclusive and does not constitute the entireity of considered causes.     ED  Course-->Patient was brought back to the emergency department room for evaluation and placed on appropriate monitoring.    Patient had IV established and blood work obtained      Labs--> CBC, CMP, CRP and sed rate unremarkable.    Radiology and imaging orders as above, interpreted per ED physician and/or radiologist----> no acute findings on CT head or cervical spine.    Patient is given 10 Xenadrine here in the ED and had significant improvement in pain.  She has a grossly normal neurological exam.  I feel she is safe to be discharged home follow-up with her  primary care provider.  She will be given a short course of Zanaflex for home.        Disposition> I spoke with the patient at the bedside regarding their plan of care, discharge instruction, home care, prescriptions, and importance follow-up.  We discussed test results at the bedside, including incidental abnormal labs, radiological findings, understands need for follow-up with primary care or specialist if indicated.      Patient was made aware of indications to return to the emergency department.  Patient agrees with the current plan of care for discharge, verbalized understanding of all instructions    Pt is aware that discharge does not mean that nothing is wrong but it indicates no emergency is present and they must continue care with follow-up as given below or physician of their choice                              Final diagnoses:   Neck pain   Acute nonintractable headache, unspecified headache type       ED Disposition  ED Disposition     ED Disposition Condition Comment    Discharge Stable           [facility] EMERGENCY DEPARTMENT  [address] [address] [phone]    As needed, If symptoms worsen    Jose Brenner MD  [address] [place] [address] [phone]    Schedule an appointment as soon as possible for a visit            Medication List      New Prescriptions    lidocaine 5 %  Commonly known as: LIDODERM  Place 1 patch on the skin as directed by provider Daily. Remove & Discard patch within 12 hours or as directed by MD     tiZANidine 2 MG tablet  Commonly known as: ZANAFLEX  Take 1 tablet by mouth Every 8 (Eight) Hours As Needed for Muscle Spasms.           Where to Get Your Medications      These medications were sent to [facility] [place], [place] - [address] - [phone]  - [phone] FX  [address] [place] [address]    Phone: [phone]   · lidocaine 5 %  · tiZANidine 2 MG tablet          Mat  Kasey PAIZ, APRN  02/22/22 0522

## 2022-02-22 NOTE — DISCHARGE INSTRUCTIONS
Please follow-up with your primary care provider, if you not have a primary care provider please utilize patient connection above to establish care  return to the ED for new or worsening symptoms

## 2022-03-02 RX ORDER — BRINZOLAMIDE 10 MG/ML
SUSPENSION/ DROPS OPHTHALMIC EVERY 12 HOURS
COMMUNITY
Start: 2022-01-12 | End: 2022-10-12

## 2022-03-03 ENCOUNTER — LAB (OUTPATIENT)
Dept: LAB | Facility: HOSPITAL | Age: 72
End: 2022-03-03

## 2022-03-03 DIAGNOSIS — E03.9 ACQUIRED HYPOTHYROIDISM: ICD-10-CM

## 2022-03-03 DIAGNOSIS — E11.65 TYPE 2 DIABETES MELLITUS WITH HYPERGLYCEMIA, WITHOUT LONG-TERM CURRENT USE OF INSULIN: ICD-10-CM

## 2022-03-03 LAB
ALBUMIN SERPL-MCNC: 4.3 G/DL (ref 3.5–5.2)
ALBUMIN/GLOB SERPL: 2 G/DL
ALP SERPL-CCNC: 51 U/L (ref 39–117)
ALT SERPL W P-5'-P-CCNC: 13 U/L (ref 1–33)
ANION GAP SERPL CALCULATED.3IONS-SCNC: 7.1 MMOL/L (ref 5–15)
AST SERPL-CCNC: 13 U/L (ref 1–32)
BILIRUB SERPL-MCNC: 0.2 MG/DL (ref 0–1.2)
BUN SERPL-MCNC: 16 MG/DL (ref 8–23)
BUN/CREAT SERPL: 17.2 (ref 7–25)
CALCIUM SPEC-SCNC: 9.8 MG/DL (ref 8.6–10.5)
CHLORIDE SERPL-SCNC: 110 MMOL/L (ref 98–107)
CO2 SERPL-SCNC: 25.9 MMOL/L (ref 22–29)
CREAT SERPL-MCNC: 0.93 MG/DL (ref 0.57–1)
EGFRCR SERPLBLD CKD-EPI 2021: 65.4 ML/MIN/1.73
GLOBULIN UR ELPH-MCNC: 2.1 GM/DL
GLUCOSE SERPL-MCNC: 82 MG/DL (ref 65–99)
HBA1C MFR BLD: 5.8 % (ref 3.5–5.6)
POTASSIUM SERPL-SCNC: 4.3 MMOL/L (ref 3.5–5.2)
PROT SERPL-MCNC: 6.4 G/DL (ref 6–8.5)
SODIUM SERPL-SCNC: 143 MMOL/L (ref 136–145)
T4 FREE SERPL-MCNC: 1.51 NG/DL (ref 0.93–1.7)
TSH SERPL DL<=0.05 MIU/L-ACNC: 1.77 UIU/ML (ref 0.27–4.2)

## 2022-03-03 PROCEDURE — 84439 ASSAY OF FREE THYROXINE: CPT

## 2022-03-03 PROCEDURE — 36415 COLL VENOUS BLD VENIPUNCTURE: CPT

## 2022-03-03 PROCEDURE — 84443 ASSAY THYROID STIM HORMONE: CPT

## 2022-03-03 PROCEDURE — 80053 COMPREHEN METABOLIC PANEL: CPT

## 2022-03-03 PROCEDURE — 83036 HEMOGLOBIN GLYCOSYLATED A1C: CPT

## 2022-03-09 PROBLEM — H18.519 FUCHS' CORNEAL DYSTROPHY: Status: ACTIVE | Noted: 2020-05-26

## 2022-03-09 PROBLEM — H40.1130 PRIMARY OPEN ANGLE GLAUCOMA (POAG) OF BOTH EYES: Status: ACTIVE | Noted: 2017-04-12

## 2022-03-09 RX ORDER — PANTOPRAZOLE SODIUM 40 MG/1
40 TABLET, DELAYED RELEASE ORAL DAILY
COMMUNITY
Start: 2022-02-02 | End: 2023-02-02 | Stop reason: HOSPADM

## 2022-03-10 ENCOUNTER — OFFICE VISIT (OUTPATIENT)
Dept: ENDOCRINOLOGY | Facility: CLINIC | Age: 72
End: 2022-03-10

## 2022-03-10 VITALS
OXYGEN SATURATION: 96 % | HEART RATE: 84 BPM | HEIGHT: 63 IN | SYSTOLIC BLOOD PRESSURE: 110 MMHG | DIASTOLIC BLOOD PRESSURE: 70 MMHG | TEMPERATURE: 97.5 F | WEIGHT: 183 LBS | BODY MASS INDEX: 32.43 KG/M2

## 2022-03-10 DIAGNOSIS — E11.42 DIABETIC PERIPHERAL NEUROPATHY: ICD-10-CM

## 2022-03-10 DIAGNOSIS — I10 ESSENTIAL HYPERTENSION: ICD-10-CM

## 2022-03-10 DIAGNOSIS — E11.9 TYPE 2 DIABETES MELLITUS WITHOUT COMPLICATION, WITHOUT LONG-TERM CURRENT USE OF INSULIN: Primary | ICD-10-CM

## 2022-03-10 DIAGNOSIS — E78.2 MIXED HYPERLIPIDEMIA: ICD-10-CM

## 2022-03-10 DIAGNOSIS — E03.9 ACQUIRED HYPOTHYROIDISM: ICD-10-CM

## 2022-03-10 LAB — GLUCOSE BLDC GLUCOMTR-MCNC: 113 MG/DL (ref 70–105)

## 2022-03-10 PROCEDURE — 82962 GLUCOSE BLOOD TEST: CPT | Performed by: INTERNAL MEDICINE

## 2022-03-10 PROCEDURE — 99214 OFFICE O/P EST MOD 30 MIN: CPT | Performed by: INTERNAL MEDICINE

## 2022-03-10 RX ORDER — MAGNESIUM 200 MG
1000 TABLET ORAL DAILY
Qty: 100 EACH | Refills: 4 | Status: SHIPPED | OUTPATIENT
Start: 2022-03-10 | End: 2022-10-12

## 2022-03-10 NOTE — PROGRESS NOTES
Endocrine Progress Note Outpatient     Patient Care Team:  Jose Brenner MD as PCP - General  Jose Brenner MD as PCP - Family Medicine  Eligio Abraham MD as Consulting Physician (Cardiology)  Preeti Herrera MD as Consulting Physician (Endocrinology)    Chief Complaint: Follow-up type 2 diabetes    HPI: 71-year-old female with history of type 2 diabetes, hypertension, hyperlipidemia and hypothyroidism is here for follow-up.    For type 2 diabetes: She is currently on metformin 1000 mg twice a day, Ozempic 0.5 mg subcutaneous weekly She was prescribed Jardiance but could not tolerate it and stopped it as she developed some yeast infections and UTIs.  Since that she started Ozempic her blood sugars have improved significantly.  She initially lost about 20 pounds with Ozempic but he is has regained 1 pound since last visit.  She would like to go off of Metformin.  She is trying to follow her diet the best she can.    Hypertension: Well-controlled    Hypothyroidism: On levothyroxine supplementation    Hyperlipidemia: She is currently on pravastatin and fenofibrate.    Past Medical History:   Diagnosis Date   • Disease of thyroid gland    • Diverticulitis    • Exertional dyspnea 8/6/2021   • Heart murmur    • Hyperlipidemia    • Hypertension    • Type 2 diabetes mellitus (HCC)    • Uncontrolled type 2 diabetes mellitus with hyperglycemia (HCC) 7/30/2019   • Unstable angina (HCC) 8/6/2021       Social History     Socioeconomic History   • Marital status:    Tobacco Use   • Smoking status: Former Smoker   • Smokeless tobacco: Never Used   Vaping Use   • Vaping Use: Never used   Substance and Sexual Activity   • Alcohol use: Yes     Comment: occasonally / social   • Drug use: Defer   • Sexual activity: Defer       Family History   Problem Relation Age of Onset   • Stroke Mother    • Cancer Father         prostate   • Heart disease Brother    • Cancer Brother         pranciritic   • Cancer Maternal  Grandmother         colon   • Heart disease Paternal Grandmother        Allergies   Allergen Reactions   • Amoxicillin-Pot Clavulanate Diarrhea   • Ciprofloxacin Myalgia   • Amoxicillin Diarrhea   • Clavulanic Acid Diarrhea       ROS:   Constitutional:  Admit fatigue, tiredness.    Eyes:  Denies change in visual acuity   HENT:  Denies nasal congestion or sore throat   Respiratory: denies cough, admit shortness of breath.   Cardiovascular:  denies chest pain, edema   GI:  Denies abdominal pain, nausea, vomiting.   Musculoskeletal:  Denies back pain or joint pain   Integument:  Denies dry skin and rash   Neurologic:  Denies headache, focal weakness or sensory changes   Endocrine:  Denies polyuria or polydipsia   Psychiatric:  Denies depression or anxiety      Vitals:    03/10/22 1038   BP: 110/70   Pulse: 84   Temp: 97.5 °F (36.4 °C)   SpO2: 96%     BMI: 32.4    Physical Exam:  GEN: NAD, conversant, obese  EYES: EOMI, PERRL, no conjunctival erythema  NECK: no thyromegaly, full ROM   CV: RRR, no murmurs/rubs/gallops, no peripheral edema  LUNG: CTAB, no wheezes/rales/ronchi  SKIN: no rashes, no acanthosis  MSK: no deformities, full ROM of all extremities  NEURO: no tremors, DTR normal  PSYCH: AOX3, appropriate mood, affect normal      Results Review:     I reviewed the patient's new clinical results.    Lab Results   Component Value Date    HGBA1C 5.8 (H) 03/03/2022    HGBA1C 5.9 (H) 08/30/2021    HGBA1C 6.1 (H) 03/01/2021      Lab Results   Component Value Date    GLUCOSE 82 03/03/2022    BUN 16 03/03/2022    CREATININE 0.93 03/03/2022    EGFRIFNONA 47 (L) 02/22/2022    BCR 17.2 03/03/2022    K 4.3 03/03/2022    CO2 25.9 03/03/2022    CALCIUM 9.8 03/03/2022    ALBUMIN 4.30 03/03/2022    LABIL2 2.1 (H) 06/01/2019    AST 13 03/03/2022    ALT 13 03/03/2022    CHOL 110 08/30/2021    TRIG 99 08/30/2021    LDL 39 08/30/2021    HDL 52 08/30/2021     Lab Results   Component Value Date    TSH 1.770 03/03/2022    FREET4 1.51  03/03/2022         Medication Review: Reviewed.       Current Outpatient Medications:   •  ACCU-CHEK DOLLY PLUS test strip, USE TO TEST BLOOD SUGAR BID, Disp: , Rfl: 3  •  amLODIPine-benazepril (LOTREL) 10-20 MG per capsule, Take 1 capsule by mouth Daily., Disp: , Rfl:   •  aspirin 81 MG EC tablet, Take 81 mg by mouth Daily., Disp: , Rfl:   •  Blood Glucose Monitoring Suppl (ACCU-CHEK DOLLY PLUS) w/Device kit, USE TO CHECK BLOOD SUGAR BID, Disp: , Rfl:   •  brinzolamide (AZOPT) 1 % ophthalmic suspension, Apply  to eye(s) as directed by provider Every 12 (Twelve) Hours., Disp: , Rfl:   •  Cholecalciferol (Vitamin D3) 50 MCG (2000 UT) capsule, Take 1 capsule by mouth Daily., Disp: 90 capsule, Rfl: 6  •  cilostazol (PLETAL) 100 MG tablet, Take 100 mg by mouth 2 (Two) Times a Day., Disp: , Rfl: 5  •  DULoxetine (CYMBALTA) 60 MG capsule, Take 60 mg by mouth Daily., Disp: , Rfl:   •  fenofibrate 160 MG tablet, Take 160 mg by mouth Daily., Disp: , Rfl:   •  latanoprost (XALATAN) 0.005 % ophthalmic solution, Administer 1 drop to both eyes Every Night., Disp: , Rfl:   •  levothyroxine (Synthroid) 100 MCG tablet, Take 1 tablet p.o. daily, Disp: 90 tablet, Rfl: 3  •  lidocaine (LIDODERM) 5 %, Place 1 patch on the skin as directed by provider Daily. Remove & Discard patch within 12 hours or as directed by MD, Disp: 6 patch, Rfl: 0  •  metFORMIN ER (GLUCOPHAGE-XR) 500 MG 24 hr tablet, Take 500 mg by mouth 2 (two) times a day., Disp: , Rfl:   •  metoprolol succinate XL (TOPROL-XL) 50 MG 24 hr tablet, Take 50 mg by mouth Daily., Disp: , Rfl:   •  nitroglycerin (NITROSTAT) 0.4 MG SL tablet, Place 0.4 mg under the tongue Every 5 (Five) Minutes As Needed for Chest Pain. Take no more than 3 doses in 15 minutes., Disp: , Rfl:   •  ondansetron (ZOFRAN) 4 MG tablet, , Disp: , Rfl:   •  pantoprazole (PROTONIX) 40 MG EC tablet, , Disp: , Rfl:   •  pravastatin (PRAVACHOL) 40 MG tablet, Take 40 mg by mouth Every Night., Disp: , Rfl:   •   Semaglutide,0.25 or 0.5MG/DOS, (Ozempic, 0.25 or 0.5 MG/DOSE,) 2 MG/1.5ML solution pen-injector, Inject 0.5mg weekly, Disp: 6 mL, Rfl: 6  •  tiZANidine (ZANAFLEX) 2 MG tablet, Take 1 tablet by mouth Every 8 (Eight) Hours As Needed for Muscle Spasms., Disp: 10 tablet, Rfl: 0    Current Facility-Administered Medications:   •  ondansetron ODT (ZOFRAN-ODT) disintegrating tablet 8 mg, 8 mg, Oral, Q6H PRN, Eligio Abraham MD      Assessment/Plan   1.  Diabetes mellitus type 2: Excellent control with A1c at 5.8%.  She would like to go off of Metformin, I will DC Metformin, we did talk about increasing the Ozempic to 1 mg but she would prefer to stay at 0.5 mg for now.  We will follow blood sugars and A1c and make recommendations as needed.  She is advised to continue to work on diet and activity.    2.  Hypertension: Well-controlled, continue current medications.    3.  Hyperlipidemia: Well-controlled, will continue pravastatin.  She would like to go off of fenofibrate, her triglycerides are doing well, I will DC fenofibrate and follow lipid panel.    4.  Hypothyroidism: Well-controlled, continue levothyroxine at 100 mcg p.o. daily.    5.  Diabetic peripheral neuropathy: She is on vitamin D, will add vitamin B12.            Preeti Herrera MD FACE.

## 2022-03-10 NOTE — PATIENT INSTRUCTIONS
DC fenofibrate and Metformin  Start vitamin B12 1000 mcg sublingual daily  Continue Ozempic 0.5 mg subcu weekly  Continue to work on your diet and activity  Labs before follow-up.

## 2022-04-13 ENCOUNTER — LAB (OUTPATIENT)
Dept: LAB | Facility: HOSPITAL | Age: 72
End: 2022-04-13

## 2022-04-13 ENCOUNTER — TRANSCRIBE ORDERS (OUTPATIENT)
Dept: ADMINISTRATIVE | Facility: HOSPITAL | Age: 72
End: 2022-04-13

## 2022-04-13 ENCOUNTER — HOSPITAL ENCOUNTER (OUTPATIENT)
Dept: CARDIOLOGY | Facility: HOSPITAL | Age: 72
Discharge: HOME | End: 2022-04-13

## 2022-04-13 DIAGNOSIS — Z01.818 PRE-OP TESTING: ICD-10-CM

## 2022-04-13 DIAGNOSIS — Z01.818 PRE-OP TESTING: Primary | ICD-10-CM

## 2022-04-13 LAB
ANION GAP SERPL CALCULATED.3IONS-SCNC: 10 MMOL/L (ref 5–15)
BASOPHILS # BLD AUTO: 0.04 10*3/MM3 (ref 0–0.2)
BASOPHILS NFR BLD AUTO: 0.5 % (ref 0–1.5)
BUN SERPL-MCNC: 15 MG/DL (ref 8–23)
BUN/CREAT SERPL: 21.1 (ref 7–25)
CALCIUM SPEC-SCNC: 9.8 MG/DL (ref 8.6–10.5)
CHLORIDE SERPL-SCNC: 106 MMOL/L (ref 98–107)
CO2 SERPL-SCNC: 26 MMOL/L (ref 22–29)
CREAT SERPL-MCNC: 0.71 MG/DL (ref 0.57–1)
DEPRECATED RDW RBC AUTO: 42.7 FL (ref 37–54)
EGFRCR SERPLBLD CKD-EPI 2021: 89.9 ML/MIN/1.73
EOSINOPHIL # BLD AUTO: 0.18 10*3/MM3 (ref 0–0.4)
EOSINOPHIL NFR BLD AUTO: 2.3 % (ref 0.3–6.2)
ERYTHROCYTE [DISTWIDTH] IN BLOOD BY AUTOMATED COUNT: 12.4 % (ref 12.3–15.4)
GLUCOSE SERPL-MCNC: 67 MG/DL (ref 65–99)
HCT VFR BLD AUTO: 40.6 % (ref 34–46.6)
HGB BLD-MCNC: 13 G/DL (ref 12–15.9)
IMM GRANULOCYTES # BLD AUTO: 0.02 10*3/MM3 (ref 0–0.05)
IMM GRANULOCYTES NFR BLD AUTO: 0.3 % (ref 0–0.5)
LYMPHOCYTES # BLD AUTO: 1.43 10*3/MM3 (ref 0.7–3.1)
LYMPHOCYTES NFR BLD AUTO: 18.5 % (ref 19.6–45.3)
MCH RBC QN AUTO: 30.7 PG (ref 26.6–33)
MCHC RBC AUTO-ENTMCNC: 32 G/DL (ref 31.5–35.7)
MCV RBC AUTO: 95.8 FL (ref 79–97)
MONOCYTES # BLD AUTO: 0.74 10*3/MM3 (ref 0.1–0.9)
MONOCYTES NFR BLD AUTO: 9.5 % (ref 5–12)
NEUTROPHILS NFR BLD AUTO: 5.34 10*3/MM3 (ref 1.7–7)
NEUTROPHILS NFR BLD AUTO: 68.9 % (ref 42.7–76)
NRBC BLD AUTO-RTO: 0 /100 WBC (ref 0–0.2)
PLATELET # BLD AUTO: 299 10*3/MM3 (ref 140–450)
PMV BLD AUTO: 10.2 FL (ref 6–12)
POTASSIUM SERPL-SCNC: 4.4 MMOL/L (ref 3.5–5.2)
QT INTERVAL: 362 MS
RBC # BLD AUTO: 4.24 10*6/MM3 (ref 3.77–5.28)
SODIUM SERPL-SCNC: 142 MMOL/L (ref 136–145)
WBC NRBC COR # BLD: 7.75 10*3/MM3 (ref 3.4–10.8)

## 2022-04-13 PROCEDURE — 80048 BASIC METABOLIC PNL TOTAL CA: CPT

## 2022-04-13 PROCEDURE — 93005 ELECTROCARDIOGRAM TRACING: CPT

## 2022-04-13 PROCEDURE — 85025 COMPLETE CBC W/AUTO DIFF WBC: CPT

## 2022-04-13 PROCEDURE — 36415 COLL VENOUS BLD VENIPUNCTURE: CPT

## 2022-04-13 PROCEDURE — 93010 ELECTROCARDIOGRAM REPORT: CPT | Performed by: INTERNAL MEDICINE

## 2022-05-31 ENCOUNTER — HOSPITAL ENCOUNTER (OUTPATIENT)
Dept: CARDIOLOGY | Facility: HOSPITAL | Age: 72
Discharge: HOME | End: 2022-05-31

## 2022-05-31 DIAGNOSIS — I65.23 BILATERAL CAROTID ARTERY OCCLUSION: ICD-10-CM

## 2022-05-31 DIAGNOSIS — I71.40 ABDOMINAL AORTIC ANEURYSM WITHOUT RUPTURE: ICD-10-CM

## 2022-05-31 DIAGNOSIS — I73.9 PERIPHERAL VASCULAR DISEASE, UNSPECIFIED: ICD-10-CM

## 2022-05-31 LAB
ABDOMINAL DIST AORTA AP: 1.66 CM
ABDOMINAL DIST AORTA TRANS: 1.66 CM
ABDOMINAL DIST AORTA VEL: 94.6 CM/S
ABDOMINAL LT COM ILIAC AP: 1.04 CM
ABDOMINAL LT COM ILIAC TRANS: 1.04 CM
ABDOMINAL MID AORTA AP: 2.06 CM
ABDOMINAL MID AORTA TRANS: 1.97 CM
ABDOMINAL MID AORTA VEL: 66.2 CM/S
ABDOMINAL PROX AORTA AP: 2.02 CM
ABDOMINAL PROX AORTA TRANS: 2.02 CM
ABDOMINAL PROX AORTA VEL: 103 CM/S
ABDOMINAL RT COM ILIAC AP: 0.91 CM
ABDOMINAL RT COM ILIAC TRANS: 0.91 CM
BH CV LOWER ARTERIAL LEFT ABI RATIO: 0.75
BH CV LOWER ARTERIAL LEFT CALF RATIO: 0.77
BH CV LOWER ARTERIAL LEFT DORSALIS PEDIS SYS MAX: 87
BH CV LOWER ARTERIAL LEFT GREAT TOE SYS MAX: 66
BH CV LOWER ARTERIAL LEFT LOW THIGH RATIO: 1.02
BH CV LOWER ARTERIAL LEFT LOW THIGH SYS MAX: 118
BH CV LOWER ARTERIAL LEFT POPLITEAL SYS MAX: 89
BH CV LOWER ARTERIAL LEFT POST TIBIAL SYS MAX: 29
BH CV LOWER ARTERIAL LEFT TBI RATIO: 0.57
BH CV LOWER ARTERIAL RIGHT ABI RATIO: 1.1
BH CV LOWER ARTERIAL RIGHT CALF RATIO: 1.32
BH CV LOWER ARTERIAL RIGHT DORSALIS PEDIS SYS MAX: 128
BH CV LOWER ARTERIAL RIGHT GREAT TOE SYS MAX: 136
BH CV LOWER ARTERIAL RIGHT LOW THIGH RATIO: 1.36
BH CV LOWER ARTERIAL RIGHT LOW THIGH SYS MAX: 158
BH CV LOWER ARTERIAL RIGHT POPLITEAL SYS MAX: 153
BH CV LOWER ARTERIAL RIGHT POST TIBIAL SYS MAX: 128
BH CV LOWER ARTERIAL RIGHT TBI RATIO: 1.17
BH CV XLRA MEAS LEFT DIST CCA EDV: -13.3 CM/SEC
BH CV XLRA MEAS LEFT DIST CCA PSV: -44.7 CM/SEC
BH CV XLRA MEAS LEFT DIST ICA EDV: -17.1 CM/SEC
BH CV XLRA MEAS LEFT DIST ICA PSV: -43.2 CM/SEC
BH CV XLRA MEAS LEFT ICA/CCA RATIO: 1
BH CV XLRA MEAS LEFT PROX CCA EDV: -11.6 CM/SEC
BH CV XLRA MEAS LEFT PROX CCA PSV: -44.9 CM/SEC
BH CV XLRA MEAS LEFT PROX ECA PSV: -51.5 CM/SEC
BH CV XLRA MEAS LEFT PROX ICA EDV: -15.8 CM/SEC
BH CV XLRA MEAS LEFT PROX ICA PSV: -34 CM/SEC
BH CV XLRA MEAS LEFT PROX SCLA PSV: 70.6 CM/SEC
BH CV XLRA MEAS LEFT VERTEBRAL A PSV: -41 CM/SEC
BH CV XLRA MEAS RIGHT DIST CCA EDV: -9.8 CM/SEC
BH CV XLRA MEAS RIGHT DIST CCA PSV: -36.1 CM/SEC
BH CV XLRA MEAS RIGHT DIST ICA EDV: 22.9 CM/SEC
BH CV XLRA MEAS RIGHT DIST ICA PSV: 52.2 CM/SEC
BH CV XLRA MEAS RIGHT ICA/CCA RATIO: 0.8
BH CV XLRA MEAS RIGHT PROX CCA EDV: -14.3 CM/SEC
BH CV XLRA MEAS RIGHT PROX CCA PSV: -69.6 CM/SEC
BH CV XLRA MEAS RIGHT PROX ECA PSV: -54.5 CM/SEC
BH CV XLRA MEAS RIGHT PROX ICA EDV: -17.2 CM/SEC
BH CV XLRA MEAS RIGHT PROX ICA PSV: -49 CM/SEC
BH CV XLRA MEAS RIGHT PROX SCLA PSV: -70.1 CM/SEC
BH CV XLRA MEAS RIGHT VERTEBRAL A PSV: -19.4 CM/SEC
MAXIMAL PREDICTED HEART RATE: 147 BPM
STRESS TARGET HR: 125 BPM
UPPER ARTERIAL LEFT ARM BRACHIAL SYS MAX: 112 MMHG
UPPER ARTERIAL RIGHT ARM BRACHIAL SYS MAX: 116 MMHG

## 2022-05-31 PROCEDURE — 93923 UPR/LXTR ART STDY 3+ LVLS: CPT

## 2022-05-31 PROCEDURE — 93880 EXTRACRANIAL BILAT STUDY: CPT

## 2022-05-31 PROCEDURE — 93978 VASCULAR STUDY: CPT

## 2022-06-07 ENCOUNTER — APPOINTMENT (OUTPATIENT)
Dept: VASCULAR SURGERY | Facility: HOSPITAL | Age: 72
End: 2022-06-07

## 2022-06-07 PROCEDURE — G0463 HOSPITAL OUTPT CLINIC VISIT: HCPCS

## 2022-06-25 ENCOUNTER — APPOINTMENT (OUTPATIENT)
Dept: CT IMAGING | Facility: HOSPITAL | Age: 72
End: 2022-06-25

## 2022-06-25 ENCOUNTER — HOSPITAL ENCOUNTER (EMERGENCY)
Facility: HOSPITAL | Age: 72
Discharge: HOME | End: 2022-06-25
Attending: EMERGENCY MEDICINE | Admitting: EMERGENCY MEDICINE

## 2022-06-25 VITALS
SYSTOLIC BLOOD PRESSURE: 130 MMHG | HEART RATE: 72 BPM | RESPIRATION RATE: 18 BRPM | WEIGHT: 180 LBS | OXYGEN SATURATION: 98 % | BODY MASS INDEX: 31.89 KG/M2 | TEMPERATURE: 98 F | HEIGHT: 63 IN | DIASTOLIC BLOOD PRESSURE: 72 MMHG
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
## 2022-06-25 DIAGNOSIS — S13.9XXA NECK SPRAIN, INITIAL ENCOUNTER: ICD-10-CM

## 2022-06-25 DIAGNOSIS — S00.83XA CONTUSION OF FACE, INITIAL ENCOUNTER: ICD-10-CM

## 2022-06-25 DIAGNOSIS — S00.03XA CONTUSION OF SCALP, INITIAL ENCOUNTER: ICD-10-CM

## 2022-06-25 PROCEDURE — 70450 CT HEAD/BRAIN W/O DYE: CPT

## 2022-06-25 PROCEDURE — 70486 CT MAXILLOFACIAL W/O DYE: CPT

## 2022-06-25 PROCEDURE — 72125 CT NECK SPINE W/O DYE: CPT

## 2022-06-25 PROCEDURE — 63710000001 ONDANSETRON ODT 4 MG TABLET DISPERSIBLE: Performed by: EMERGENCY MEDICINE

## 2022-06-25 PROCEDURE — 99284 EMERGENCY DEPT VISIT MOD MDM: CPT

## 2022-06-25 RX ORDER — ACETAMINOPHEN 500 MG
1000 TABLET ORAL ONCE
Status: COMPLETED | OUTPATIENT
Start: 2022-06-25 | End: 2022-06-25

## 2022-06-25 RX ORDER — ONDANSETRON 4 MG/1
4 TABLET, ORALLY DISINTEGRATING ORAL ONCE
Status: COMPLETED | OUTPATIENT
Start: 2022-06-25 | End: 2022-06-25

## 2022-06-25 RX ADMIN — ACETAMINOPHEN 1000 MG: 500 TABLET ORAL at 08:25

## 2022-06-25 RX ADMIN — ONDANSETRON 4 MG: 4 TABLET, ORALLY DISINTEGRATING ORAL at 08:25

## 2022-06-25 NOTE — DISCHARGE INSTRUCTIONS
Neurochecks every 4 hours return for vomiting altered mental status unequal pupils severe headache or any other new or worsening problems or concerns.  Elevate head ice packs Tylenol for pain.

## 2022-06-25 NOTE — ED PROVIDER NOTES
Subjective   Chief complaint fall out of bed with head injury    History of present illness 72-year-old female who states that she was sleeping this morning and she rolled out of bed and fell hit her head on the ground.  She was woke up immediately she complains of moderate throbbing pain to the right side of her head and right jaw.  Otherwise nonradiating.  Nausea but no vomiting.  No visual changes no neck pain no numbness or tingling or other complaints or injury this happened about 7:00 AM.  No recent injury illness.  She states that she was tired last night as her  passed away and the  was yesterday and she has a lot of company and she was up and more active than usual.  No other complaints or associated symptoms at this time.  Patient takes no anticoagulants          Review of Systems   Constitutional: Negative for chills and fever.   Eyes: Negative for photophobia and visual disturbance.   Respiratory: Negative for chest tightness and shortness of breath.    Cardiovascular: Negative for chest pain and palpitations.   Gastrointestinal: Positive for rectal pain. Negative for abdominal pain and vomiting.   Musculoskeletal: Negative for back pain and neck pain.   Neurological: Positive for headaches. Negative for dizziness and light-headedness.   Psychiatric/Behavioral: Negative for agitation and confusion.       Past Medical History:   Diagnosis Date   • Disease of thyroid gland    • Diverticulitis    • Exertional dyspnea 2021   • Heart murmur    • Hyperlipidemia    • Hypertension    • Type 2 diabetes mellitus ([facility])    • Uncontrolled type 2 diabetes mellitus with hyperglycemia ([facility]) 2019   • Unstable angina ([facility]) 2021       Allergies   Allergen Reactions   • Amoxicillin-Pot Clavulanate Diarrhea   • Ciprofloxacin Myalgia   • Amoxicillin Diarrhea   • Clavulanic Acid Diarrhea       Past Surgical History:   Procedure Laterality Date   • CARDIAC CATHETERIZATION N/A 2021    Procedure:  Left Heart Cath;  Surgeon: Eligio Abraham MD;  Location: [facility] CATH INVASIVE LOCATION;  Service: Cardiology;  Laterality: N/A;   • CATARACT EXTRACTION  01/01/2014   • COLON RESECTION  01/01/2016   • HERNIA REPAIR  01/01/2016       Family History   Problem Relation Age of Onset   • Stroke Mother    • Cancer Father         prostate   • Heart disease Brother    • Cancer Brother         pranciritic   • Cancer Maternal Grandmother         colon   • Heart disease Paternal Grandmother        Social History     Socioeconomic History   • Marital status:    Tobacco Use   • Smoking status: Former Smoker   • Smokeless tobacco: Never Used   Vaping Use   • Vaping Use: Never used   Substance and Sexual Activity   • Alcohol use: Yes     Comment: occasonally / social   • Drug use: Defer   • Sexual activity: Defer     Prior to Admission medications    Medication Sig Start Date End Date Taking? Authorizing Provider   ACCU-CHEK DOLLY PLUS test strip USE TO TEST BLOOD SUGAR BID 6/14/19   Jose Alejandro Lanza MD   amLODIPine-benazepril (LOTREL) 10-20 MG per capsule Take 1 capsule by mouth Daily. 2/20/21   Jose Alejandro Lanza MD   aspirin 81 MG EC tablet Take 81 mg by mouth Daily.    ProviderJose Alejandro MD   Blood Glucose Monitoring Suppl (ACCU-CHEK DOLLY PLUS) w/Device kit USE TO CHECK BLOOD SUGAR BID 3/30/20   Jose Alejandro Lanza MD   brinzolamide (AZOPT) 1 % ophthalmic suspension Apply  to eye(s) as directed by provider Every 12 (Twelve) Hours. 1/12/22   Jose Alejandro Lanza MD   Cholecalciferol (Vitamin D3) 50 MCG (2000 UT) capsule Take 1 capsule by mouth Daily. 3/8/21   Preeti Herrera MD   cilostazol (PLETAL) 100 MG tablet Take 100 mg by mouth 2 (Two) Times a Day. 10/24/19   Jose Alejandro Lanza MD   Cyanocobalamin (Vitamin B-12) 1000 MCG sublingual tablet Place 1 tablet under the tongue Daily. 3/10/22   Preeti Herrera MD   DULoxetine (CYMBALTA) 60 MG capsule Take 60 mg by mouth Daily.    Pool  MD Jose Alejandro   latanoprost (XALATAN) 0.005 % ophthalmic solution Administer 1 drop to both eyes Every Night. 3/3/20   Jose Alejandro Lanza MD   levothyroxine (Synthroid) 100 MCG tablet Take 1 tablet p.o. daily 10/22/21   Preeti Herrera MD   lidocaine (LIDODERM) 5 % Place 1 patch on the skin as directed by provider Daily. Remove & Discard patch within 12 hours or as directed by MD 2/22/22   Kasey Rivero, MARYAM   metoprolol succinate XL (TOPROL-XL) 50 MG 24 hr tablet Take 50 mg by mouth Daily. 2/19/21   Jose Alejandro Lanza MD   nitroglycerin (NITROSTAT) 0.4 MG SL tablet Place 0.4 mg under the tongue Every 5 (Five) Minutes As Needed for Chest Pain. Take no more than 3 doses in 15 minutes.    Jose Alejandro Lanza MD   ondansetron (ZOFRAN) 4 MG tablet  9/4/21   Jose Alejandro Lanza MD   pantoprazole (PROTONIX) 40 MG EC tablet  2/2/22   Jose Alejandro Lanza MD   pravastatin (PRAVACHOL) 40 MG tablet Take 40 mg by mouth Every Night. 5/22/19   Jose Alejandro Lanza MD   Semaglutide,0.25 or 0.5MG/DOS, (Ozempic, 0.25 or 0.5 MG/DOSE,) 2 MG/1.5ML solution pen-injector Inject 0.5mg weekly 9/1/21   Preeti Herrera MD   tiZANidine (ZANAFLEX) 2 MG tablet Take 1 tablet by mouth Every 8 (Eight) Hours As Needed for Muscle Spasms. 2/22/22   Kasey Rivero, MARYAM           Objective   Physical Exam  Constitutional 72-year-old female awake alert no distress temperature 97.5 heart rate 83 blood pressure 134/94.  HEENT large contusion to the right side of the head in the temporal area but no step-off or deformity extraocular muscles are intact pupils equal round reactive there is no raccoon or choudhary sign.  There is no auricle hematoma or pain to the ear.  Face midface stable lower jaw stable good occlusion.  Patient has some mild pain to the right TMJ joint but opens and close without difficulty no crepitus or subcutaneous air.  Good occlusion lower jaw stable.  Speech normal.  Back no direct cervical thoracic lumbar spine  tenderness noted.  Neck supple no adenopathy trachea midline no bruits chest wall nontender no bruising lungs clear no retractions.  Heart regular without murmur.  Abdomen soft nontender good bowel sounds no bruits extremities full range of motion no obvious deformities no pain throughout any upper or lower extremities mild contusion right knee but is nontender she can flex and extend it raise it up without difficulty quadricep patella tendons intact.  Patient stable to stress without anterior posterior drawer.  She is moving everything without difficulty.  No pain to the hips.  No shortening rotation.  Neurologic awake alert orientated x4 [place] Coma Scale 15  Procedures           ED Course        CT Head Without Contrast    Result Date: 6/25/2022   CT HEAD: No acute intracranial abnormality. Chronic white matter changes compatible small vessel ischemic disease in this age group.  CT face:  No acute abnormality of the face.  Electronically Signed ByNik Grace On:6/25/2022 9:31 AM This report was finalized on [id number] by  Jamaica Burrows    CT Cervical Spine Without Contrast    Result Date: 6/25/2022  No acute osseous abnormality  Multilevel degenerative changes of the spine similar to the previous exam  Electronically Signed ByNik Grace On:6/25/2022 9:38 AM This report was finalized on [id number] by  Jamaica Burrows    CT Facial Bones Without Contrast    Result Date: 6/25/2022   CT HEAD: No acute intracranial abnormality. Chronic white matter changes compatible small vessel ischemic disease in this age group.  CT face:  No acute abnormality of the face.  Electronically Signed ByNik Grace On:6/25/2022 9:31 AM This report was finalized on [id number] by  Jamaica Burrows    Medications   ondansetron ODT (ZOFRAN-ODT) disintegrating tablet 4 mg (4 mg Oral Given 6/25/22 0825)   acetaminophen (TYLENOL) tablet 1,000 mg (1,000 mg Oral Given 6/25/22 0825)                                             MDM  Number of Diagnoses or Management Options  Contusion of face, initial encounter: new and requires workup  Contusion of scalp, initial encounter: new and requires workup  Fall, initial encounter: new and requires workup  Neck sprain, initial encounter: new and requires workup  Diagnosis management comments: Medical decision making.  Patient was given the above exam evaluation given a gram of Tylenol p.o. and 4 Zofran p.o. CT scan of the head face and neck all obtained.  This was reviewed by myself as well as radiology and radiology report was reviewed by me no acute hemorrhage chronic white matter changes no acute fracture left spine degenerative changes noted.  CT of the facial bones no acute fractures.  Patient repeat exam was resting comfortably made aware of the findings.  She remained awake alert [place] Coma Scale 15.  I see no evidence of fracture no evidence of a hemorrhage.  There is no other evidence of any other acute injury at this point she is resting comfortably feeling better we talked about what to return for and was discharged home for outpatient management follow-up stable unremarkable ER course improved       Amount and/or Complexity of Data Reviewed  Tests in the radiology section of CPT®: reviewed    Risk of Complications, Morbidity, and/or Mortality  Presenting problems: moderate  Diagnostic procedures: moderate  Management options: moderate    Patient Progress  Patient progress: stable      Final diagnoses:   Fall, initial encounter   Contusion of scalp, initial encounter   Contusion of face, initial encounter   Neck sprain, initial encounter       ED Disposition  ED Disposition     ED Disposition   Discharge    Condition   Stable    Comment   --             Jose Brenner MD  [address] [place] [address] [place] [id number]  [phone]    In 1 week           Medication List      No changes were made to your prescriptions during this visit.          Jonathan Narayan,  MD  06/26/22 1537

## 2022-07-21 ENCOUNTER — OFFICE (AMBULATORY)
Dept: URBAN - METROPOLITAN AREA PATHOLOGY 4 | Facility: PATHOLOGY | Age: 72
End: 2022-07-21
Payer: COMMERCIAL

## 2022-07-21 ENCOUNTER — OFFICE (AMBULATORY)
Dept: URBAN - METROPOLITAN AREA PATHOLOGY 4 | Facility: PATHOLOGY | Age: 72
End: 2022-07-21

## 2022-07-21 ENCOUNTER — ON CAMPUS - OUTPATIENT (AMBULATORY)
Dept: URBAN - METROPOLITAN AREA HOSPITAL 2 | Facility: HOSPITAL | Age: 72
End: 2022-07-21

## 2022-07-21 VITALS
SYSTOLIC BLOOD PRESSURE: 134 MMHG | RESPIRATION RATE: 17 BRPM | SYSTOLIC BLOOD PRESSURE: 106 MMHG | HEART RATE: 74 BPM | DIASTOLIC BLOOD PRESSURE: 78 MMHG | OXYGEN SATURATION: 97 % | HEART RATE: 82 BPM | RESPIRATION RATE: 15 BRPM | DIASTOLIC BLOOD PRESSURE: 75 MMHG | RESPIRATION RATE: 16 BRPM | OXYGEN SATURATION: 95 % | SYSTOLIC BLOOD PRESSURE: 101 MMHG | RESPIRATION RATE: 18 BRPM | HEART RATE: 78 BPM | OXYGEN SATURATION: 94 % | OXYGEN SATURATION: 99 % | SYSTOLIC BLOOD PRESSURE: 113 MMHG | DIASTOLIC BLOOD PRESSURE: 59 MMHG | DIASTOLIC BLOOD PRESSURE: 65 MMHG | HEART RATE: 81 BPM | HEIGHT: 64 IN | SYSTOLIC BLOOD PRESSURE: 110 MMHG | DIASTOLIC BLOOD PRESSURE: 60 MMHG | SYSTOLIC BLOOD PRESSURE: 104 MMHG | HEART RATE: 71 BPM | SYSTOLIC BLOOD PRESSURE: 120 MMHG | TEMPERATURE: 97.5 F | OXYGEN SATURATION: 96 % | HEART RATE: 77 BPM | DIASTOLIC BLOOD PRESSURE: 68 MMHG | DIASTOLIC BLOOD PRESSURE: 62 MMHG | HEART RATE: 73 BPM | SYSTOLIC BLOOD PRESSURE: 112 MMHG | WEIGHT: 179 LBS | DIASTOLIC BLOOD PRESSURE: 54 MMHG | OXYGEN SATURATION: 98 % | HEART RATE: 79 BPM

## 2022-07-21 DIAGNOSIS — D12.0 BENIGN NEOPLASM OF CECUM: ICD-10-CM

## 2022-07-21 DIAGNOSIS — K57.30 DIVERTICULOSIS OF LARGE INTESTINE WITHOUT PERFORATION OR ABS: ICD-10-CM

## 2022-07-21 DIAGNOSIS — Z86.010 PERSONAL HISTORY OF COLONIC POLYPS: ICD-10-CM

## 2022-07-21 DIAGNOSIS — D17.79 BENIGN LIPOMATOUS NEOPLASM OF OTHER SITES: ICD-10-CM

## 2022-07-21 DIAGNOSIS — K64.1 SECOND DEGREE HEMORRHOIDS: ICD-10-CM

## 2022-07-21 PROBLEM — K63.5 POLYP OF COLON: Status: ACTIVE | Noted: 2022-07-21

## 2022-07-21 LAB
GI HISTOLOGY: A. UNSPECIFIED: (no result)
GI HISTOLOGY: PDF REPORT: (no result)

## 2022-07-21 PROCEDURE — 88305 TISSUE EXAM BY PATHOLOGIST: CPT | Mod: 26 | Performed by: INTERNAL MEDICINE

## 2022-07-21 PROCEDURE — 45385 COLONOSCOPY W/LESION REMOVAL: CPT | Mod: PT | Performed by: INTERNAL MEDICINE

## 2022-08-08 RX ORDER — ACETAMINOPHEN 160 MG
TABLET,DISINTEGRATING ORAL
Qty: 90 CAPSULE | Refills: 6 | Status: SHIPPED | OUTPATIENT
Start: 2022-08-08

## 2022-08-31 RX ORDER — LEVOTHYROXINE SODIUM 0.1 MG/1
TABLET ORAL
Qty: 90 TABLET | Refills: 3 | Status: SHIPPED | OUTPATIENT
Start: 2022-08-31

## 2022-09-06 ENCOUNTER — TRANSCRIBE ORDERS (OUTPATIENT)
Dept: ADMINISTRATIVE | Facility: HOSPITAL | Age: 72
End: 2022-09-06

## 2022-09-06 DIAGNOSIS — I73.9 PERIPHERAL VASCULAR DISEASE, UNSPECIFIED: Primary | ICD-10-CM

## 2022-09-13 DIAGNOSIS — E11.65 UNCONTROLLED TYPE 2 DIABETES MELLITUS WITH HYPERGLYCEMIA: ICD-10-CM

## 2022-09-13 RX ORDER — SEMAGLUTIDE 1.34 MG/ML
INJECTION, SOLUTION SUBCUTANEOUS
Qty: 4.5 ML | Refills: 3 | Status: SHIPPED | OUTPATIENT
Start: 2022-09-13 | End: 2022-10-25

## 2022-09-30 ENCOUNTER — TELEPHONE (OUTPATIENT)
Dept: ENDOCRINOLOGY | Facility: CLINIC | Age: 72
End: 2022-09-30

## 2022-09-30 DIAGNOSIS — M79.676 PAIN OF GREAT TOE, UNSPECIFIED LATERALITY: ICD-10-CM

## 2022-09-30 DIAGNOSIS — E11.9 TYPE 2 DIABETES MELLITUS WITHOUT COMPLICATION, WITHOUT LONG-TERM CURRENT USE OF INSULIN: Primary | ICD-10-CM

## 2022-09-30 DIAGNOSIS — E11.42 DIABETIC PERIPHERAL NEUROPATHY: ICD-10-CM

## 2022-09-30 DIAGNOSIS — I10 ESSENTIAL HYPERTENSION: ICD-10-CM

## 2022-10-03 NOTE — TELEPHONE ENCOUNTER
Add uric acid level to her labs but if she is having pain in the toes she needs to be seen by somebody either primary care or urgent care before October 18.    Message text

## 2022-10-03 NOTE — TELEPHONE ENCOUNTER
Blessing pt. Order entered. Pt states that she cannot get in with her pcp so she will go to  Urgent Care today.

## 2022-10-04 NOTE — TELEPHONE ENCOUNTER
Pt went to [facility] yesterday (10/3/22). Has closed nondisplaced fracture of proximal phalanx of left great toe. Xray report routed to Dr. Herrera.

## 2022-10-05 NOTE — TELEPHONE ENCOUNTER
Blessing pt who states that the [facility] has already referred her to Dr. Juan. Order is in Epic- pt will call for appt.

## 2022-10-05 NOTE — TELEPHONE ENCOUNTER
Preeti Herrera MD  You 21 hours ago (12:08 PM)         Please refer to podiatry Dr. Juan for further evaluation.

## 2022-10-11 ENCOUNTER — LAB REQUISITION (OUTPATIENT)
Dept: LAB | Facility: HOSPITAL | Age: 72
End: 2022-10-11

## 2022-10-11 DIAGNOSIS — D48.5 NEOPLASM OF UNCERTAIN BEHAVIOR OF SKIN: ICD-10-CM

## 2022-10-11 PROCEDURE — 88305 TISSUE EXAM BY PATHOLOGIST: CPT | Performed by: OTOLARYNGOLOGY

## 2022-10-12 ENCOUNTER — OFFICE VISIT (OUTPATIENT)
Dept: PODIATRY | Facility: CLINIC | Age: 72
End: 2022-10-12

## 2022-10-12 VITALS — WEIGHT: 190 LBS | BODY MASS INDEX: 33.66 KG/M2 | HEART RATE: 83 BPM | OXYGEN SATURATION: 94 % | HEIGHT: 63 IN

## 2022-10-12 DIAGNOSIS — S92.415A CLOSED NONDISPLACED FRACTURE OF PROXIMAL PHALANX OF LEFT GREAT TOE, INITIAL ENCOUNTER: ICD-10-CM

## 2022-10-12 DIAGNOSIS — I73.9 PERIPHERAL ARTERIAL DISEASE: ICD-10-CM

## 2022-10-12 DIAGNOSIS — M77.42 METATARSALGIA, LEFT FOOT: ICD-10-CM

## 2022-10-12 DIAGNOSIS — M79.672 LEFT FOOT PAIN: Primary | ICD-10-CM

## 2022-10-12 DIAGNOSIS — E11.65 TYPE 2 DIABETES MELLITUS WITH HYPERGLYCEMIA, WITHOUT LONG-TERM CURRENT USE OF INSULIN: ICD-10-CM

## 2022-10-12 LAB
LAB AP CASE REPORT: NORMAL
PATH REPORT.FINAL DX SPEC: NORMAL
PATH REPORT.GROSS SPEC: NORMAL

## 2022-10-12 PROCEDURE — 99203 OFFICE O/P NEW LOW 30 MIN: CPT | Performed by: PODIATRIST

## 2022-10-12 NOTE — PROGRESS NOTES
"10/12/2022  Foot and Ankle Surgery - New Patient   Provider: Dr. Paolo Juan DPM  Location: [facility]    Subjective:  Katty Ramirez is a 72 y.o. female.     Chief Complaint   Patient presents with   • Left Foot - Fracture, Toe Injury   • Initial Evaluation     LAILA Brenner md        HPI:   The patient is a 72-year-old female who presents to the clinic for evaluation of an injury to her left foot.    She reports she fell on 08/20/2022 and assumed she fractured her left great toe. She states she believed her toe would heal eventually; however, she continued to experience pain. She acknowledges there was also presence of edema and erythema. The patient notes she went to urgent care where they obtained took x-rays, and they informed her she fractured her left great toe in 2 places. She reports prior to the injury, she was experiencing pain in her left great toe occasionally and adds she has a nodule on the \"side\". She notes less discomfort within the last week.     She reports she has type 2 diabetes and currently does not have a podiatrist. The patient states her last blood glucose A1c was 5.8 percent.     She notes she has moderate peripheral arterial disease. She states she has seen a vascular surgeon in the past. The patient acknowledges she completed an ankle brachial index in 2020.    Allergies   Allergen Reactions   • Amoxicillin-Pot Clavulanate Diarrhea   • Ciprofloxacin Myalgia   • Amoxicillin Diarrhea   • Clavulanic Acid Diarrhea       Past Medical History:   Diagnosis Date   • Disease of thyroid gland    • Diverticulitis    • Exertional dyspnea 8/6/2021   • Heart murmur    • Hyperlipidemia    • Hypertension    • Type 2 diabetes mellitus (HCC)    • Uncontrolled type 2 diabetes mellitus with hyperglycemia (HCC) 7/30/2019   • Unstable angina (HCC) 8/6/2021       Past Surgical History:   Procedure Laterality Date   • CARDIAC CATHETERIZATION N/A 8/12/2021    Procedure: Left Heart Cath;  Surgeon: " Eligio Abraham MD;  Location: [facility] INVASIVE LOCATION;  Service: Cardiology;  Laterality: N/A;   • CATARACT EXTRACTION  01/01/2014   • COLON RESECTION  01/01/2016   • HERNIA REPAIR  01/01/2016       Family History   Problem Relation Age of Onset   • Stroke Mother    • Cancer Father         prostate   • Heart disease Brother    • Cancer Brother         pranciritic   • Cancer Maternal Grandmother         colon   • Heart disease Paternal Grandmother        Social History     Socioeconomic History   • Marital status:    Tobacco Use   • Smoking status: Former   • Smokeless tobacco: Never   Vaping Use   • Vaping Use: Never used   Substance and Sexual Activity   • Alcohol use: Yes     Comment: occasonally / social   • Drug use: Defer   • Sexual activity: Defer        Current Outpatient Medications on File Prior to Visit   Medication Sig Dispense Refill   • ACCU-CHEK DOLLY PLUS test strip USE TO TEST BLOOD SUGAR BID  3   • amLODIPine-benazepril (LOTREL) 10-20 MG per capsule Take 1 capsule by mouth Daily.     • aspirin 81 MG EC tablet Take 81 mg by mouth Daily.     • Blood Glucose Monitoring Suppl (ACCU-CHEK DOLLY PLUS) w/Device kit USE TO CHECK BLOOD SUGAR BID     • Cholecalciferol (Vitamin D3) 50 MCG (2000 UT) capsule TAKE ONE CAPSULE BY MOUTH DAILY 90 capsule 6   • cilostazol (PLETAL) 100 MG tablet Take 100 mg by mouth 2 (Two) Times a Day.  5   • DULoxetine (CYMBALTA) 60 MG capsule Take 60 mg by mouth Daily.     • latanoprost (XALATAN) 0.005 % ophthalmic solution Administer 1 drop to both eyes Every Night.     • levothyroxine (SYNTHROID, LEVOTHROID) 100 MCG tablet TAKE 1 TABLET DAILY 90 tablet 3   • metoprolol succinate XL (TOPROL-XL) 50 MG 24 hr tablet Take 50 mg by mouth Daily.     • pantoprazole (PROTONIX) 40 MG EC tablet      • pravastatin (PRAVACHOL) 40 MG tablet Take 40 mg by mouth Every Night.     • Semaglutide,0.25 or 0.5MG/DOS, (Ozempic, 0.25 or 0.5 MG/DOSE,) 2 MG/1.5ML solution  "pen-injector DIAL AND INJECT UNDER THE SKIN 0.5 MG WEEKLY 4.5 mL 3     Current Facility-Administered Medications on File Prior to Visit   Medication Dose Route Frequency Provider Last Rate Last Admin   • ondansetron ODT (ZOFRAN-ODT) disintegrating tablet 8 mg  8 mg Oral Q6H PRN Eligio Abraham MD           Review of Systems:  General: Denies fever, chills, fatigue, and weakness.  Eyes: Denies vision loss, blurry vision, and excessive redness.  ENT: Denies hearing issues and difficulty swallowing.  Cardiovascular: Denies palpitations, chest pain, or syncopal episodes.  Respiratory: Denies shortness of breath, wheezing, and coughing.  GI: Denies abdominal pain, nausea, and vomiting.   : Denies frequency, hematuria, and urgency.  Musculoskeletal: Denies muscle cramps, joint pains, and stiffness.  Derm: Denies rash, open wounds, or suspicious lesions.  Neuro: Denies headaches, numbness, loss of coordination, and tremors.  Psych: Denies anxiety and depression.  Endocrine: Denies temperature intolerance and changes in appetite.  Heme: Denies bleeding disorders or abnormal bruising.     Objective   Pulse 83   Ht 160 cm (63\")   Wt 86.2 kg (190 lb)   SpO2 94%   BMI 33.66 kg/m²     Foot/Ankle Exam:       General:   Appearance: appears stated age and healthy    Orientation: AAOx3    Affect: appropriate      VASCULAR      Right Foot Vascularity   Normal vascular exam    Dorsalis pedis:  2+  Posterior tibial:  2+  Skin Temperature: warm    Edema Grading:  None  CFT:  < 3 seconds  Pedal Hair Growth:  Present  Varicosities: none       Left Foot Vascularity   Normal vascular exam    Dorsalis pedis:  2+  Posterior tibial:  2+  Skin Temperature: warm    Edema Grading:  None  CFT:  < 3 seconds  Pedal Hair Growth:  Present  Varicosities: none        NEUROLOGIC     Right Foot Neurologic   Light touch sensation:  Normal  Hot/Cold sensation: normal    Achilles reflex:  2+     Left Foot Neurologic   Light touch sensation:  " Normal  Hot/cold sensation: normal    Achilles reflex:  2+     MUSCULOSKELETAL      Right Foot Musculoskeletal   Ecchymosis:  None  Arch:  Normal     Left Foot Musculoskeletal   Ecchymosis:  None  Arch:  Normal     MUSCLE STRENGTH     Right Foot Muscle Strength   Normal strength    Foot dorsiflexion:  5  Foot plantar flexion:  5  Foot inversion:  5  Foot eversion:  5     Left Foot Muscle Strength   Normal strength    Foot dorsiflexion:  5  Foot plantar flexion:  5  Foot inversion:  5  Foot eversion:  5     DERMATOLOGIC     Right Foot Dermatologic   Skin: skin intact    Nails comment:  Nails 1-5     Left Foot Dermatologic   Skin: skin intact    Nails comment:  Nails 1-5     TESTS     Right Foot Tests   Anterior drawer: negative    Varus tilt: negative       Left Foot Tests   Anterior drawer: negative    Varus tilt: negative        Left Foot Additional Comments: 10/12/2022 Mild discomfort with palpation involving the left great toe as well as the left forefoot. No gross deformity, swelling, or signs of inflammation. Mild soft tissue rigidity and equinus contracture with knee extended and flexed.      Assessment & Plan   Diagnoses and all orders for this visit:    1. Left foot pain (Primary)    2. Closed nondisplaced fracture of proximal phalanx of left great toe, initial encounter    3. Metatarsalgia, left foot    4. Type 2 diabetes mellitus with hyperglycemia, without long-term current use of insulin (HCC)    5. Peripheral arterial disease (HCC)        The patient presents to the clinic for evaluation of an injury to her left foot. Imaging was independently reviewed showing 2 small nondisplaced fractures to her left great toe. Discussed the imaging, diagnosis, and treatment options at length. Recommended she consider purchasing over-the-counter arch supports. Advised the patient to perform stretching and range of motion manual therapy exercises 1 to 3 times daily. Explained that her overall foot risk from a diabetic  standpoint is minimal. Recommended she follow-up with vascular to complete a repeat ankle brachial index. Advised her to remain with vascular if she has already established care. The patient will return to the clinic in 4 to 6 weeks for re-evaluation. Greater than 30 minutes spent before, during, and after evaluation for patient care.    No orders of the defined types were placed in this encounter.       Note is dictated utilizing voice recognition software. Unfortunately this leads to occasional typographical errors. I apologize in advance if the situation occurs. If questions occur please do not hesitate to call our office.    Transcribed from ambient dictation for GIOVANNA Juan DPM by Porsha Wilks.  10/12/22   12:39 EDT

## 2022-10-18 ENCOUNTER — LAB (OUTPATIENT)
Dept: LAB | Facility: HOSPITAL | Age: 72
End: 2022-10-18

## 2022-10-18 DIAGNOSIS — E11.42 DIABETIC PERIPHERAL NEUROPATHY: ICD-10-CM

## 2022-10-18 DIAGNOSIS — M79.676 PAIN OF GREAT TOE, UNSPECIFIED LATERALITY: ICD-10-CM

## 2022-10-18 DIAGNOSIS — E11.9 TYPE 2 DIABETES MELLITUS WITHOUT COMPLICATION, WITHOUT LONG-TERM CURRENT USE OF INSULIN: ICD-10-CM

## 2022-10-18 LAB
ALBUMIN SERPL-MCNC: 4.4 G/DL (ref 3.5–5.2)
ALBUMIN UR-MCNC: <1.2 MG/DL
ALBUMIN/GLOB SERPL: 1.7 G/DL
ALP SERPL-CCNC: 62 U/L (ref 39–117)
ALT SERPL W P-5'-P-CCNC: 13 U/L (ref 1–33)
ANION GAP SERPL CALCULATED.3IONS-SCNC: 9 MMOL/L (ref 5–15)
AST SERPL-CCNC: 16 U/L (ref 1–32)
BILIRUB SERPL-MCNC: 0.3 MG/DL (ref 0–1.2)
BUN SERPL-MCNC: 17 MG/DL (ref 8–23)
BUN/CREAT SERPL: 17 (ref 7–25)
CALCIUM SPEC-SCNC: 9.9 MG/DL (ref 8.6–10.5)
CHLORIDE SERPL-SCNC: 103 MMOL/L (ref 98–107)
CHOLEST SERPL-MCNC: 126 MG/DL (ref 0–200)
CO2 SERPL-SCNC: 28 MMOL/L (ref 22–29)
CREAT SERPL-MCNC: 1 MG/DL (ref 0.57–1)
CREAT UR-MCNC: 90.5 MG/DL
EGFRCR SERPLBLD CKD-EPI 2021: 59.6 ML/MIN/1.73
GLOBULIN UR ELPH-MCNC: 2.6 GM/DL
GLUCOSE SERPL-MCNC: 92 MG/DL (ref 65–99)
HBA1C MFR BLD: 6.5 % (ref 3.5–5.6)
HDLC SERPL-MCNC: 58 MG/DL (ref 40–60)
LDLC SERPL CALC-MCNC: 46 MG/DL (ref 0–100)
LDLC/HDLC SERPL: 0.74 {RATIO}
MICROALBUMIN/CREAT UR: NORMAL MG/G{CREAT}
POTASSIUM SERPL-SCNC: 4.1 MMOL/L (ref 3.5–5.2)
PROT SERPL-MCNC: 7 G/DL (ref 6–8.5)
SODIUM SERPL-SCNC: 140 MMOL/L (ref 136–145)
T4 FREE SERPL-MCNC: 1.55 NG/DL (ref 0.93–1.7)
TRIGL SERPL-MCNC: 124 MG/DL (ref 0–150)
TSH SERPL DL<=0.05 MIU/L-ACNC: 3.03 UIU/ML (ref 0.27–4.2)
URATE SERPL-MCNC: 4.3 MG/DL (ref 2.4–5.7)
VLDLC SERPL-MCNC: 22 MG/DL (ref 5–40)

## 2022-10-18 PROCEDURE — 84550 ASSAY OF BLOOD/URIC ACID: CPT

## 2022-10-18 PROCEDURE — 84443 ASSAY THYROID STIM HORMONE: CPT

## 2022-10-18 PROCEDURE — 80061 LIPID PANEL: CPT

## 2022-10-18 PROCEDURE — 83036 HEMOGLOBIN GLYCOSYLATED A1C: CPT

## 2022-10-18 PROCEDURE — 82043 UR ALBUMIN QUANTITATIVE: CPT

## 2022-10-18 PROCEDURE — 84439 ASSAY OF FREE THYROXINE: CPT

## 2022-10-18 PROCEDURE — 82570 ASSAY OF URINE CREATININE: CPT

## 2022-10-18 PROCEDURE — 80053 COMPREHEN METABOLIC PANEL: CPT

## 2022-10-18 PROCEDURE — 36415 COLL VENOUS BLD VENIPUNCTURE: CPT

## 2022-10-21 RX ORDER — FENOFIBRATE 160 MG/1
160 TABLET ORAL DAILY
COMMUNITY
Start: 2022-09-20 | End: 2023-03-22

## 2022-10-21 RX ORDER — CLOTRIMAZOLE AND BETAMETHASONE DIPROPIONATE 10; .64 MG/G; MG/G
CREAM TOPICAL
COMMUNITY
Start: 2022-09-20 | End: 2022-10-25

## 2022-10-21 RX ORDER — CEFPROZIL 250 MG/1
TABLET, FILM COATED ORAL
COMMUNITY
Start: 2022-09-21 | End: 2022-10-25

## 2022-10-25 ENCOUNTER — TELEMEDICINE (OUTPATIENT)
Dept: ENDOCRINOLOGY | Facility: CLINIC | Age: 72
End: 2022-10-25

## 2022-10-25 VITALS — BODY MASS INDEX: 33.66 KG/M2 | WEIGHT: 190 LBS | HEIGHT: 63 IN

## 2022-10-25 DIAGNOSIS — I10 ESSENTIAL HYPERTENSION: ICD-10-CM

## 2022-10-25 DIAGNOSIS — E78.2 MIXED HYPERLIPIDEMIA: ICD-10-CM

## 2022-10-25 DIAGNOSIS — E03.9 ACQUIRED HYPOTHYROIDISM: ICD-10-CM

## 2022-10-25 DIAGNOSIS — E11.9 TYPE 2 DIABETES MELLITUS WITHOUT COMPLICATION, WITHOUT LONG-TERM CURRENT USE OF INSULIN: Primary | ICD-10-CM

## 2022-10-25 PROBLEM — R01.1 HEART MURMUR: Status: ACTIVE | Noted: 2022-08-26

## 2022-10-25 PROCEDURE — 99214 OFFICE O/P EST MOD 30 MIN: CPT | Performed by: INTERNAL MEDICINE

## 2022-10-25 NOTE — PATIENT INSTRUCTIONS
Hold Ozempic  Please go to the emergency room or urgent care center for evaluation of diarrhea and fever  Continue to follow diet  Follow-up in 6 months with labs.

## 2022-10-25 NOTE — PROGRESS NOTES
Endocrine Progress Note Outpatient     Patient Care Team:  Jose Brenner MD as PCP - General  Jose Brenner MD as PCP - Family Medicine  Eligio Abraham MD as Consulting Physician (Cardiology)  Preeti Herrera MD as Consulting Physician (Endocrinology)  You have chosen to receive care through a telehealth visit.  Do you consent to use a video/audio connection for your medical care today? Yes    Chief Complaint: Follow-up type 2 diabetes    HPI: 72-year-old female with history of type 2 diabetes, hypertension, hyperlipidemia and hypothyroidism is here for follow-up.    For type 2 diabetes: She is currently on Ozempic 0.5 mg subcutaneous weekly She was prescribed Jardiance but could not tolerate it and stopped it as she developed some yeast infections and UTIs.  Since that she started Ozempic her blood sugars have improved significantly. She is trying to follow her diet the best she can. BS doing well at home.     Hypertension: Well-controlled    Hypothyroidism: On levothyroxine supplementation    Hyperlipidemia: She is currently on pravastatin.    She has been complaining of diarrhea and fever the last few days, she has been trying to reach her GI doctor but has not been successful yet.    Past Medical History:   Diagnosis Date   • Disease of thyroid gland    • Diverticulitis    • Exertional dyspnea 8/6/2021   • Heart murmur    • Hyperlipidemia    • Hypertension    • Type 2 diabetes mellitus (HCC)    • Uncontrolled type 2 diabetes mellitus with hyperglycemia (HCC) 7/30/2019   • Unstable angina (HCC) 8/6/2021       Social History     Socioeconomic History   • Marital status:    • Number of children: 3   • Years of education: 12   Tobacco Use   • Smoking status: Former   • Smokeless tobacco: Never   Vaping Use   • Vaping Use: Never used   Substance and Sexual Activity   • Alcohol use: Yes     Comment: occasonally / social   • Drug use: Defer   • Sexual activity: Defer       Family History    Problem Relation Age of Onset   • Stroke Mother    • Cancer Father         prostate   • Heart disease Brother    • Cancer Brother         pranciritic   • Cancer Maternal Grandmother         colon   • Heart disease Paternal Grandmother        Allergies   Allergen Reactions   • Amoxicillin-Pot Clavulanate Diarrhea   • Ciprofloxacin Myalgia   • Amoxicillin Diarrhea   • Clavulanic Acid Diarrhea       ROS:   Constitutional:  Admit fatigue, tiredness.    Eyes:  Denies change in visual acuity   HENT:  Denies nasal congestion or sore throat   Respiratory: denies cough, admit shortness of breath.   Cardiovascular:  denies chest pain, edema   GI:  Denies abdominal pain, nausea, vomiting.   Musculoskeletal:  Denies back pain or joint pain   Integument:  Denies dry skin and rash   Neurologic:  Denies headache, focal weakness or sensory changes   Endocrine:  Denies polyuria or polydipsia   Psychiatric:  Denies depression or anxiety      There were no vitals filed for this visit.  BMI: 32.4    Physical Exam:  GEN: NAD, conversant, obese  PSYCH: AOX3, appropriate mood, affect normal      Results Review:     I reviewed the patient's new clinical results.    Lab Results   Component Value Date    HGBA1C 6.5 (H) 10/18/2022    HGBA1C 5.8 (H) 03/03/2022    HGBA1C 5.9 (H) 08/30/2021      Lab Results   Component Value Date    GLUCOSE 92 10/18/2022    BUN 17 10/18/2022    CREATININE 1.00 10/18/2022    EGFRIFNONA 47 (L) 02/22/2022    BCR 17.0 10/18/2022    K 4.1 10/18/2022    CO2 28.0 10/18/2022    CALCIUM 9.9 10/18/2022    ALBUMIN 4.40 10/18/2022    LABIL2 2.1 (H) 06/01/2019    AST 16 10/18/2022    ALT 13 10/18/2022    CHOL 126 10/18/2022    TRIG 124 10/18/2022    LDL 46 10/18/2022    HDL 58 10/18/2022     Lab Results   Component Value Date    TSH 3.030 10/18/2022    FREET4 1.55 10/18/2022         Medication Review: Reviewed.       Current Outpatient Medications:   •  ACCU-CHEK DOLLY PLUS test strip, USE TO TEST BLOOD SUGAR BID, Disp: ,  Rfl: 3  •  amLODIPine-benazepril (LOTREL) 10-20 MG per capsule, Take 1 capsule by mouth Daily., Disp: , Rfl:   •  aspirin 81 MG EC tablet, Take 81 mg by mouth Daily., Disp: , Rfl:   •  Blood Glucose Monitoring Suppl (ACCU-CHEK DOLLY PLUS) w/Device kit, USE TO CHECK BLOOD SUGAR BID, Disp: , Rfl:   •  Cholecalciferol (Vitamin D3) 50 MCG (2000 UT) capsule, TAKE ONE CAPSULE BY MOUTH DAILY, Disp: 90 capsule, Rfl: 6  •  cilostazol (PLETAL) 100 MG tablet, Take 100 mg by mouth 2 (Two) Times a Day., Disp: , Rfl: 5  •  DULoxetine (CYMBALTA) 60 MG capsule, Take 60 mg by mouth Daily., Disp: , Rfl:   •  fenofibrate 160 MG tablet, , Disp: , Rfl:   •  latanoprost (XALATAN) 0.005 % ophthalmic solution, Administer 1 drop to both eyes Every Night., Disp: , Rfl:   •  levothyroxine (SYNTHROID, LEVOTHROID) 100 MCG tablet, TAKE 1 TABLET DAILY, Disp: 90 tablet, Rfl: 3  •  metoprolol succinate XL (TOPROL-XL) 50 MG 24 hr tablet, Take 50 mg by mouth Daily., Disp: , Rfl:   •  pantoprazole (PROTONIX) 40 MG EC tablet, , Disp: , Rfl:   •  pravastatin (PRAVACHOL) 40 MG tablet, Take 40 mg by mouth Every Night., Disp: , Rfl:   •  cefprozil (CEFZIL) 250 MG tablet, , Disp: , Rfl:   •  Cholecalciferol 50 MCG (2000 UT) capsule, Take 1 capsule by mouth Daily., Disp: , Rfl:   •  clotrimazole-betamethasone (LOTRISONE) 1-0.05 % cream, , Disp: , Rfl:   •  Semaglutide,0.25 or 0.5MG/DOS, (Ozempic, 0.25 or 0.5 MG/DOSE,) 2 MG/1.5ML solution pen-injector, DIAL AND INJECT UNDER THE SKIN 0.5 MG WEEKLY, Disp: 4.5 mL, Rfl: 3    Current Facility-Administered Medications:   •  ondansetron ODT (ZOFRAN-ODT) disintegrating tablet 8 mg, 8 mg, Oral, Q6H PRN, Eligio Abraham MD      Assessment & Plan   1.  Diabetes mellitus type 2 with hyperglycemia: Well-controlled with A1c at 6.5%.  She now tells me that the Ozempic is expensive and she is like to hold off on it.  She will hold off Ozempic will follow blood sugars.  She is advised to continue to work on diet  and activity.    2.  Hypertension: Well-controlled, continue current medications.    3.  Hyperlipidemia: Well-controlled, will continue pravastatin.     4.  Hypothyroidism: Well-controlled, continue levothyroxine at 100 mcg p.o. daily.    5.  Diabetic peripheral neuropathy: She is on vitamin D, will add vitamin B12.    6.  Diarrhea with fever: Advised her to go to the either emergency room or urgent care center for further evaluation.  She verbalized understanding.            Preeti Herrera MD FACE.

## 2022-10-26 ENCOUNTER — OFFICE (AMBULATORY)
Dept: URBAN - METROPOLITAN AREA LAB 2 | Facility: LAB | Age: 72
End: 2022-10-26

## 2022-10-26 DIAGNOSIS — R19.7 DIARRHEA, UNSPECIFIED: ICD-10-CM

## 2022-10-26 PROCEDURE — 87505 NFCT AGENT DETECTION GI: CPT | Performed by: INTERNAL MEDICINE

## 2022-10-28 RX ORDER — SEMAGLUTIDE 1.34 MG/ML
0.5 INJECTION, SOLUTION SUBCUTANEOUS WEEKLY
Start: 2022-10-28

## 2022-11-03 ENCOUNTER — OFFICE (AMBULATORY)
Dept: URBAN - METROPOLITAN AREA CLINIC 64 | Facility: CLINIC | Age: 72
End: 2022-11-03
Payer: COMMERCIAL

## 2022-11-03 VITALS
WEIGHT: 200 LBS | HEART RATE: 76 BPM | HEIGHT: 64 IN | DIASTOLIC BLOOD PRESSURE: 70 MMHG | SYSTOLIC BLOOD PRESSURE: 129 MMHG

## 2022-11-03 DIAGNOSIS — R19.7 DIARRHEA, UNSPECIFIED: ICD-10-CM

## 2022-11-03 DIAGNOSIS — Z77.21: ICD-10-CM

## 2022-11-03 PROCEDURE — 99213 OFFICE O/P EST LOW 20 MIN: CPT | Performed by: INTERNAL MEDICINE

## 2022-11-22 ENCOUNTER — OFFICE VISIT (OUTPATIENT)
Dept: PODIATRY | Facility: CLINIC | Age: 72
End: 2022-11-22

## 2022-11-22 VITALS — HEIGHT: 63 IN | WEIGHT: 190 LBS | BODY MASS INDEX: 33.66 KG/M2 | RESPIRATION RATE: 20 BRPM

## 2022-11-22 DIAGNOSIS — M79.672 LEFT FOOT PAIN: Primary | ICD-10-CM

## 2022-11-22 DIAGNOSIS — M77.42 METATARSALGIA, LEFT FOOT: ICD-10-CM

## 2022-11-22 DIAGNOSIS — S92.415D CLOSED NONDISPLACED FRACTURE OF PROXIMAL PHALANX OF LEFT GREAT TOE WITH ROUTINE HEALING, SUBSEQUENT ENCOUNTER: ICD-10-CM

## 2022-11-22 DIAGNOSIS — E11.65 TYPE 2 DIABETES MELLITUS WITH HYPERGLYCEMIA, WITHOUT LONG-TERM CURRENT USE OF INSULIN: ICD-10-CM

## 2022-11-22 PROCEDURE — 99213 OFFICE O/P EST LOW 20 MIN: CPT | Performed by: PODIATRIST

## 2022-11-22 NOTE — PROGRESS NOTES
11/22/2022  Foot and Ankle Surgery - Established Patient/Follow-up  Provider: Dr. Paolo Juan DPM  Location: [facility]    Subjective:  Katty Ramirez is a 72 y.o. female.     Chief Complaint   Patient presents with   • Left Foot - Follow-up, Toe Injury, Pain   • Follow-up     LAILA Brenner md  unknown       HPI:   Patient is a 72-year-old female who returns for follow-up regarding her left foot pain.    The patient reports overall improvement to her left foot. She notes that she has been experiencing pain on the side of her left great toe. The patient does not recall getting a repeat AZRA, but believes she might have it performed in 01/2023. She reports that she has been wearing the inserts and notes improvement.    Allergies   Allergen Reactions   • Amoxicillin-Pot Clavulanate Diarrhea   • Ciprofloxacin Myalgia   • Amoxicillin Diarrhea   • Clavulanic Acid Diarrhea       Current Outpatient Medications on File Prior to Visit   Medication Sig Dispense Refill   • ACCU-CHEK DOLLY PLUS test strip USE TO TEST BLOOD SUGAR BID  3   • amLODIPine-benazepril (LOTREL) 10-20 MG per capsule Take 1 capsule by mouth Daily.     • aspirin 81 MG EC tablet Take 81 mg by mouth Daily.     • Blood Glucose Monitoring Suppl (ACCU-CHEK DOLLY PLUS) w/Device kit USE TO CHECK BLOOD SUGAR BID     • Cholecalciferol (Vitamin D3) 50 MCG (2000 UT) capsule TAKE ONE CAPSULE BY MOUTH DAILY 90 capsule 6   • cilostazol (PLETAL) 100 MG tablet Take 100 mg by mouth 2 (Two) Times a Day.  5   • DULoxetine (CYMBALTA) 60 MG capsule Take 60 mg by mouth Daily.     • fenofibrate 160 MG tablet      • latanoprost (XALATAN) 0.005 % ophthalmic solution Administer 1 drop to both eyes Every Night.     • levothyroxine (SYNTHROID, LEVOTHROID) 100 MCG tablet TAKE 1 TABLET DAILY 90 tablet 3   • metoprolol succinate XL (TOPROL-XL) 50 MG 24 hr tablet Take 50 mg by mouth Daily.     • pantoprazole (PROTONIX) 40 MG EC tablet      • pravastatin (PRAVACHOL) 40 MG  "tablet Take 40 mg by mouth Every Night.     • Semaglutide,0.25 or 0.5MG/DOS, (Ozempic, 0.25 or 0.5 MG/DOSE,) 2 MG/1.5ML solution pen-injector Inject 0.5 mg under the skin into the appropriate area as directed 1 (One) Time Per Week.       Current Facility-Administered Medications on File Prior to Visit   Medication Dose Route Frequency Provider Last Rate Last Admin   • ondansetron ODT (ZOFRAN-ODT) disintegrating tablet 8 mg  8 mg Oral Q6H PRN Eligio Abraham MD           Objective   Resp 20   Ht 160 cm (63\")   Wt 86.2 kg (190 lb)   BMI 33.66 kg/m²     Foot/Ankle Exam:       General:   Appearance: appears stated age and healthy    Orientation: AAOx3    Affect: appropriate      VASCULAR      Right Foot Vascularity   Normal vascular exam    Dorsalis pedis:  2+  Posterior tibial:  2+  Skin Temperature: warm    Edema Grading:  None  CFT:  < 3 seconds  Pedal Hair Growth:  Present  Varicosities: none       Left Foot Vascularity   Normal vascular exam    Dorsalis pedis:  2+  Posterior tibial:  2+  Skin Temperature: warm    Edema Grading:  None  CFT:  < 3 seconds  Pedal Hair Growth:  Present  Varicosities: none        NEUROLOGIC     Right Foot Neurologic   Light touch sensation:  Normal  Hot/Cold sensation: normal    Achilles reflex:  2+     Left Foot Neurologic   Light touch sensation:  Normal  Hot/cold sensation: normal    Achilles reflex:  2+     MUSCULOSKELETAL      Right Foot Musculoskeletal   Ecchymosis:  None  Arch:  Normal     Left Foot Musculoskeletal   Ecchymosis:  None  Arch:  Normal     MUSCLE STRENGTH     Right Foot Muscle Strength   Normal strength    Foot dorsiflexion:  5  Foot plantar flexion:  5  Foot inversion:  5  Foot eversion:  5     Left Foot Muscle Strength   Normal strength    Foot dorsiflexion:  5  Foot plantar flexion:  5  Foot inversion:  5  Foot eversion:  5     DERMATOLOGIC     Right Foot Dermatologic   Skin: skin intact    Nails comment:  Nails 1-5     Left Foot Dermatologic   Skin: " skin intact    Nails comment:  Nails 1-5     TESTS     Right Foot Tests   Anterior drawer: negative    Varus tilt: negative       Left Foot Tests   Anterior drawer: negative    Varus tilt: negative        Left Foot Additional Comments: 10/12/2022 Mild discomfort with palpation involving the left great toe as well as the left forefoot. No gross deformity, swelling, or signs of inflammation. Mild soft tissue rigidity and equinus contracture with knee extended and flexed.    11/22/2022  minimal discomfort with palpation to the interphalangeal joint region of the left great toe. No progressive deformity or instability.      X-ray, 3 views of the left foot, taken on 11/22/2022, reveal a comminuted fracture of the proximal phalanx of the left great toe. There is no change in alignment compared to previous x-rays. There are signs of healing.    Assessment & Plan   Diagnoses and all orders for this visit:    1. Left foot pain (Primary)    2. Closed nondisplaced fracture of proximal phalanx of left great toe with routine healing, subsequent encounter  -     XR Foot 3+ View Left    3. Metatarsalgia, left foot    4. Type 2 diabetes mellitus with hyperglycemia, without long-term current use of insulin (HCC)      Patient presents to the office today for a follow-up regarding her left foot pain. Imaging was reviewed with the patient, which reveals that the fracture is healing well. I explained that the pain she is experiencing is normal at this point in her recovery. I recommend continuing to wear the inserts and perform stretching exercises. The patient will return to the office in 4 months for a routine diabetic foot check or sooner if she experiences any issues.     Greater than 20 minutes was spent before, during, and after evaluation for patient care.    Orders Placed This Encounter   Procedures   • XR Foot 3+ View Left     Order Specific Question:   Reason for Exam:     Answer:   closed nondisplaced fx proximal phalanx left  great toe   room 14  wb     Order Specific Question:   Does this patient have a diabetic monitoring/medication delivering device on?     Answer:   No     Order Specific Question:   Release to patient     Answer:   Routine Release          Note is dictated utilizing voice recognition software. Unfortunately this leads to occasional typographical errors. I apologize in advance if the situation occurs. If questions occur please do not hesitate to call our office.    Transcribed from ambient dictation for GIOVANNA Juan DPM by Cathi Kelly.  11/22/22   12:14 EST    Patient or patient representative verbalized consent to the visit recording.  I have personally performed the services described in this document as transcribed by the above individual, and it is both accurate and complete.

## 2023-01-25 ENCOUNTER — TRANSCRIBE ORDERS (OUTPATIENT)
Dept: ADMINISTRATIVE | Facility: HOSPITAL | Age: 73
End: 2023-01-25
Payer: MEDICARE

## 2023-01-25 DIAGNOSIS — I65.23 BILATERAL CAROTID ARTERY OCCLUSION: ICD-10-CM

## 2023-01-25 DIAGNOSIS — I71.40 ABDOMINAL AORTIC ANEURYSM (AAA) WITHOUT RUPTURE, UNSPECIFIED PART: Primary | ICD-10-CM

## 2023-01-28 ENCOUNTER — INPATIENT HOSPITAL (AMBULATORY)
Dept: URBAN - METROPOLITAN AREA HOSPITAL 84 | Facility: HOSPITAL | Age: 73
End: 2023-01-28

## 2023-01-28 ENCOUNTER — APPOINTMENT (OUTPATIENT)
Dept: CT IMAGING | Facility: HOSPITAL | Age: 73
DRG: 391 | End: 2023-01-28
Payer: MEDICARE

## 2023-01-28 ENCOUNTER — APPOINTMENT (OUTPATIENT)
Dept: GENERAL RADIOLOGY | Facility: HOSPITAL | Age: 73
DRG: 391 | End: 2023-01-28
Payer: MEDICARE

## 2023-01-28 ENCOUNTER — HOSPITAL ENCOUNTER (INPATIENT)
Facility: HOSPITAL | Age: 73
LOS: 4 days | Discharge: HOME | DRG: 391 | End: 2023-02-02
Attending: EMERGENCY MEDICINE | Admitting: FAMILY MEDICINE
Payer: MEDICARE

## 2023-01-28 DIAGNOSIS — K26.9 DUODENAL ULCER, UNSPECIFIED AS ACUTE OR CHRONIC, WITHOUT HEM: ICD-10-CM

## 2023-01-28 DIAGNOSIS — K29.80 DUODENITIS: ICD-10-CM

## 2023-01-28 DIAGNOSIS — R10.13 EPIGASTRIC PAIN: Primary | ICD-10-CM

## 2023-01-28 DIAGNOSIS — R93.5 ABNORMAL CT OF THE ABDOMEN: ICD-10-CM

## 2023-01-28 LAB
ALBUMIN SERPL-MCNC: 4.1 G/DL (ref 3.5–5.2)
ALBUMIN/GLOB SERPL: 1.2 G/DL
ALP SERPL-CCNC: 59 U/L (ref 39–117)
ALT SERPL W P-5'-P-CCNC: 15 U/L (ref 1–33)
AMYLASE SERPL-CCNC: 94 U/L (ref 28–100)
ANION GAP SERPL CALCULATED.3IONS-SCNC: 10 MMOL/L (ref 5–15)
ANION GAP SERPL CALCULATED.3IONS-SCNC: 12 MMOL/L (ref 5–15)
AST SERPL-CCNC: 14 U/L (ref 1–32)
BASOPHILS # BLD AUTO: 0 10*3/MM3 (ref 0–0.2)
BASOPHILS # BLD AUTO: 0.1 10*3/MM3 (ref 0–0.2)
BASOPHILS NFR BLD AUTO: 0.4 % (ref 0–1.5)
BASOPHILS NFR BLD AUTO: 0.5 % (ref 0–1.5)
BILIRUB SERPL-MCNC: 0.7 MG/DL (ref 0–1.2)
BILIRUB UR QL STRIP: NEGATIVE
BUN SERPL-MCNC: 11 MG/DL (ref 8–23)
BUN SERPL-MCNC: 16 MG/DL (ref 8–23)
BUN/CREAT SERPL: 13.6 (ref 7–25)
BUN/CREAT SERPL: 16.2 (ref 7–25)
CALCIUM SPEC-SCNC: 9.2 MG/DL (ref 8.6–10.5)
CALCIUM SPEC-SCNC: 9.5 MG/DL (ref 8.6–10.5)
CHLORIDE SERPL-SCNC: 102 MMOL/L (ref 98–107)
CHLORIDE SERPL-SCNC: 106 MMOL/L (ref 98–107)
CLARITY UR: CLEAR
CO2 SERPL-SCNC: 25 MMOL/L (ref 22–29)
CO2 SERPL-SCNC: 26 MMOL/L (ref 22–29)
COLOR UR: YELLOW
CREAT SERPL-MCNC: 0.81 MG/DL (ref 0.57–1)
CREAT SERPL-MCNC: 0.99 MG/DL (ref 0.57–1)
DEPRECATED RDW RBC AUTO: 45.1 FL (ref 37–54)
DEPRECATED RDW RBC AUTO: 45.9 FL (ref 37–54)
EGFRCR SERPLBLD CKD-EPI 2021: 60.3 ML/MIN/1.73
EGFRCR SERPLBLD CKD-EPI 2021: 76.8 ML/MIN/1.73
EOSINOPHIL # BLD AUTO: 0 10*3/MM3 (ref 0–0.4)
EOSINOPHIL # BLD AUTO: 0 10*3/MM3 (ref 0–0.4)
EOSINOPHIL NFR BLD AUTO: 0.4 % (ref 0.3–6.2)
EOSINOPHIL NFR BLD AUTO: 0.4 % (ref 0.3–6.2)
ERYTHROCYTE [DISTWIDTH] IN BLOOD BY AUTOMATED COUNT: 13.4 % (ref 12.3–15.4)
ERYTHROCYTE [DISTWIDTH] IN BLOOD BY AUTOMATED COUNT: 13.6 % (ref 12.3–15.4)
FLUAV SUBTYP SPEC NAA+PROBE: NOT DETECTED
FLUBV RNA ISLT QL NAA+PROBE: NOT DETECTED
GLOBULIN UR ELPH-MCNC: 3.4 GM/DL
GLUCOSE BLDC GLUCOMTR-MCNC: 127 MG/DL (ref 70–105)
GLUCOSE SERPL-MCNC: 118 MG/DL (ref 65–99)
GLUCOSE SERPL-MCNC: 181 MG/DL (ref 65–99)
GLUCOSE UR STRIP-MCNC: NEGATIVE MG/DL
HCT VFR BLD AUTO: 38.2 % (ref 34–46.6)
HCT VFR BLD AUTO: 40.5 % (ref 34–46.6)
HGB BLD-MCNC: 12.7 G/DL (ref 12–15.9)
HGB BLD-MCNC: 13.4 G/DL (ref 12–15.9)
HGB UR QL STRIP.AUTO: NEGATIVE
KETONES UR QL STRIP: NEGATIVE
LEUKOCYTE ESTERASE UR QL STRIP.AUTO: NEGATIVE
LIPASE SERPL-CCNC: 139 U/L (ref 13–60)
LYMPHOCYTES # BLD AUTO: 1.3 10*3/MM3 (ref 0.7–3.1)
LYMPHOCYTES # BLD AUTO: 1.4 10*3/MM3 (ref 0.7–3.1)
LYMPHOCYTES NFR BLD AUTO: 11.5 % (ref 19.6–45.3)
LYMPHOCYTES NFR BLD AUTO: 13.8 % (ref 19.6–45.3)
MCH RBC QN AUTO: 30.6 PG (ref 26.6–33)
MCH RBC QN AUTO: 30.9 PG (ref 26.6–33)
MCHC RBC AUTO-ENTMCNC: 33.1 G/DL (ref 31.5–35.7)
MCHC RBC AUTO-ENTMCNC: 33.4 G/DL (ref 31.5–35.7)
MCV RBC AUTO: 92.3 FL (ref 79–97)
MCV RBC AUTO: 92.6 FL (ref 79–97)
MONOCYTES # BLD AUTO: 1 10*3/MM3 (ref 0.1–0.9)
MONOCYTES # BLD AUTO: 1.1 10*3/MM3 (ref 0.1–0.9)
MONOCYTES NFR BLD AUTO: 10.1 % (ref 5–12)
MONOCYTES NFR BLD AUTO: 9.9 % (ref 5–12)
NEUTROPHILS NFR BLD AUTO: 7.5 10*3/MM3 (ref 1.7–7)
NEUTROPHILS NFR BLD AUTO: 75.3 % (ref 42.7–76)
NEUTROPHILS NFR BLD AUTO: 77.7 % (ref 42.7–76)
NEUTROPHILS NFR BLD AUTO: 8.9 10*3/MM3 (ref 1.7–7)
NITRITE UR QL STRIP: NEGATIVE
NRBC BLD AUTO-RTO: 0 /100 WBC (ref 0–0.2)
NRBC BLD AUTO-RTO: 0 /100 WBC (ref 0–0.2)
PH UR STRIP.AUTO: 8 [PH] (ref 5–8)
PLATELET # BLD AUTO: 237 10*3/MM3 (ref 140–450)
PLATELET # BLD AUTO: 240 10*3/MM3 (ref 140–450)
PMV BLD AUTO: 8.3 FL (ref 6–12)
PMV BLD AUTO: 8.6 FL (ref 6–12)
POTASSIUM SERPL-SCNC: 3.9 MMOL/L (ref 3.5–5.2)
POTASSIUM SERPL-SCNC: 4.1 MMOL/L (ref 3.5–5.2)
PROT SERPL-MCNC: 7.5 G/DL (ref 6–8.5)
PROT UR QL STRIP: ABNORMAL
RBC # BLD AUTO: 4.12 10*6/MM3 (ref 3.77–5.28)
RBC # BLD AUTO: 4.39 10*6/MM3 (ref 3.77–5.28)
SARS-COV-2 RNA PNL SPEC NAA+PROBE: NOT DETECTED
SODIUM SERPL-SCNC: 140 MMOL/L (ref 136–145)
SODIUM SERPL-SCNC: 141 MMOL/L (ref 136–145)
SP GR UR STRIP: 1.04 (ref 1–1.03)
UROBILINOGEN UR QL STRIP: ABNORMAL
WBC NRBC COR # BLD: 10 10*3/MM3 (ref 3.4–10.8)
WBC NRBC COR # BLD: 11.5 10*3/MM3 (ref 3.4–10.8)

## 2023-01-28 PROCEDURE — 83690 ASSAY OF LIPASE: CPT | Performed by: EMERGENCY MEDICINE

## 2023-01-28 PROCEDURE — 25010000002 MORPHINE PER 10 MG: Performed by: NURSE PRACTITIONER

## 2023-01-28 PROCEDURE — 80053 COMPREHEN METABOLIC PANEL: CPT | Performed by: EMERGENCY MEDICINE

## 2023-01-28 PROCEDURE — G0378 HOSPITAL OBSERVATION PER HR: HCPCS

## 2023-01-28 PROCEDURE — 74177 CT ABD & PELVIS W/CONTRAST: CPT

## 2023-01-28 PROCEDURE — 99284 EMERGENCY DEPT VISIT MOD MDM: CPT

## 2023-01-28 PROCEDURE — 82962 GLUCOSE BLOOD TEST: CPT

## 2023-01-28 PROCEDURE — 25010000002 PIPERACILLIN SOD-TAZOBACTAM PER 1 G: Performed by: NURSE PRACTITIONER

## 2023-01-28 PROCEDURE — 71045 X-RAY EXAM CHEST 1 VIEW: CPT

## 2023-01-28 PROCEDURE — 87636 SARSCOV2 & INF A&B AMP PRB: CPT | Performed by: EMERGENCY MEDICINE

## 2023-01-28 PROCEDURE — 0 IOPAMIDOL PER 1 ML: Performed by: EMERGENCY MEDICINE

## 2023-01-28 PROCEDURE — 99223 1ST HOSP IP/OBS HIGH 75: CPT | Performed by: SURGERY

## 2023-01-28 PROCEDURE — 99222 1ST HOSP IP/OBS MODERATE 55: CPT | Mod: FS | Performed by: NURSE PRACTITIONER

## 2023-01-28 PROCEDURE — 85025 COMPLETE CBC W/AUTO DIFF WBC: CPT | Performed by: EMERGENCY MEDICINE

## 2023-01-28 PROCEDURE — 85025 COMPLETE CBC W/AUTO DIFF WBC: CPT | Performed by: NURSE PRACTITIONER

## 2023-01-28 PROCEDURE — 99285 EMERGENCY DEPT VISIT HI MDM: CPT

## 2023-01-28 PROCEDURE — 81003 URINALYSIS AUTO W/O SCOPE: CPT | Performed by: EMERGENCY MEDICINE

## 2023-01-28 PROCEDURE — 82150 ASSAY OF AMYLASE: CPT | Performed by: EMERGENCY MEDICINE

## 2023-01-28 RX ORDER — PANTOPRAZOLE SODIUM 40 MG/10ML
40 INJECTION, POWDER, LYOPHILIZED, FOR SOLUTION INTRAVENOUS ONCE
Status: COMPLETED | OUTPATIENT
Start: 2023-01-28 | End: 2023-01-28

## 2023-01-28 RX ORDER — NITROGLYCERIN 0.4 MG/1
0.4 TABLET SUBLINGUAL
Status: DISCONTINUED | OUTPATIENT
Start: 2023-01-28 | End: 2023-02-02 | Stop reason: HOSPADM

## 2023-01-28 RX ORDER — MORPHINE SULFATE 2 MG/ML
2 INJECTION, SOLUTION INTRAMUSCULAR; INTRAVENOUS EVERY 4 HOURS PRN
Status: DISCONTINUED | OUTPATIENT
Start: 2023-01-28 | End: 2023-01-29

## 2023-01-28 RX ORDER — PANTOPRAZOLE SODIUM 40 MG/10ML
40 INJECTION, POWDER, LYOPHILIZED, FOR SOLUTION INTRAVENOUS EVERY 12 HOURS SCHEDULED
Status: DISCONTINUED | OUTPATIENT
Start: 2023-01-28 | End: 2023-01-28

## 2023-01-28 RX ORDER — ONDANSETRON 2 MG/ML
4 INJECTION INTRAMUSCULAR; INTRAVENOUS EVERY 6 HOURS PRN
Status: DISCONTINUED | OUTPATIENT
Start: 2023-01-28 | End: 2023-02-02 | Stop reason: HOSPADM

## 2023-01-28 RX ORDER — SODIUM CHLORIDE 9 MG/ML
40 INJECTION, SOLUTION INTRAVENOUS AS NEEDED
Status: DISCONTINUED | OUTPATIENT
Start: 2023-01-28 | End: 2023-02-02 | Stop reason: HOSPADM

## 2023-01-28 RX ORDER — FUROSEMIDE 20 MG/1
20 TABLET ORAL DAILY
COMMUNITY
End: 2023-02-02 | Stop reason: HOSPADM

## 2023-01-28 RX ORDER — SODIUM CHLORIDE, SODIUM LACTATE, POTASSIUM CHLORIDE, CALCIUM CHLORIDE 600; 310; 30; 20 MG/100ML; MG/100ML; MG/100ML; MG/100ML
125 INJECTION, SOLUTION INTRAVENOUS CONTINUOUS
Status: DISCONTINUED | OUTPATIENT
Start: 2023-01-28 | End: 2023-02-02

## 2023-01-28 RX ORDER — SODIUM CHLORIDE 0.9 % (FLUSH) 0.9 %
3 SYRINGE (ML) INJECTION EVERY 12 HOURS SCHEDULED
Status: DISCONTINUED | OUTPATIENT
Start: 2023-01-28 | End: 2023-02-02 | Stop reason: HOSPADM

## 2023-01-28 RX ORDER — PANTOPRAZOLE SODIUM 40 MG/10ML
40 INJECTION, POWDER, LYOPHILIZED, FOR SOLUTION INTRAVENOUS EVERY 12 HOURS SCHEDULED
Status: DISCONTINUED | OUTPATIENT
Start: 2023-01-28 | End: 2023-02-02 | Stop reason: HOSPADM

## 2023-01-28 RX ORDER — SODIUM CHLORIDE 0.9 % (FLUSH) 0.9 %
3-10 SYRINGE (ML) INJECTION AS NEEDED
Status: DISCONTINUED | OUTPATIENT
Start: 2023-01-28 | End: 2023-02-02 | Stop reason: HOSPADM

## 2023-01-28 RX ORDER — HYDRALAZINE HYDROCHLORIDE 20 MG/ML
10 INJECTION INTRAMUSCULAR; INTRAVENOUS EVERY 6 HOURS PRN
Status: DISCONTINUED | OUTPATIENT
Start: 2023-01-28 | End: 2023-02-02 | Stop reason: HOSPADM

## 2023-01-28 RX ADMIN — IOPAMIDOL 100 ML: 755 INJECTION, SOLUTION INTRAVENOUS at 07:46

## 2023-01-28 RX ADMIN — MORPHINE SULFATE 2 MG: 2 INJECTION, SOLUTION INTRAMUSCULAR; INTRAVENOUS at 13:21

## 2023-01-28 RX ADMIN — SODIUM CHLORIDE, POTASSIUM CHLORIDE, SODIUM LACTATE AND CALCIUM CHLORIDE 100 ML/HR: 600; 310; 30; 20 INJECTION, SOLUTION INTRAVENOUS at 13:21

## 2023-01-28 RX ADMIN — PIPERACILLIN AND TAZOBACTAM 3.38 G: 3; .375 INJECTION, POWDER, LYOPHILIZED, FOR SOLUTION INTRAVENOUS at 08:32

## 2023-01-28 RX ADMIN — SODIUM CHLORIDE 1000 ML: 9 INJECTION, SOLUTION INTRAVENOUS at 06:46

## 2023-01-28 RX ADMIN — PIPERACILLIN AND TAZOBACTAM 3.38 G: 3; .375 INJECTION, POWDER, LYOPHILIZED, FOR SOLUTION INTRAVENOUS at 17:21

## 2023-01-28 RX ADMIN — Medication 3 ML: at 21:29

## 2023-01-28 RX ADMIN — PANTOPRAZOLE SODIUM 40 MG: 40 INJECTION, POWDER, LYOPHILIZED, FOR SOLUTION INTRAVENOUS at 06:46

## 2023-01-28 RX ADMIN — PANTOPRAZOLE SODIUM 40 MG: 40 INJECTION, POWDER, LYOPHILIZED, FOR SOLUTION INTRAVENOUS at 17:21

## 2023-01-28 RX ADMIN — MORPHINE SULFATE 2 MG: 2 INJECTION, SOLUTION INTRAMUSCULAR; INTRAVENOUS at 17:21

## 2023-01-28 RX ADMIN — MORPHINE SULFATE 2 MG: 2 INJECTION, SOLUTION INTRAMUSCULAR; INTRAVENOUS at 21:28

## 2023-01-28 RX ADMIN — SODIUM CHLORIDE, POTASSIUM CHLORIDE, SODIUM LACTATE AND CALCIUM CHLORIDE 100 ML/HR: 600; 310; 30; 20 INJECTION, SOLUTION INTRAVENOUS at 23:56

## 2023-01-29 ENCOUNTER — INPATIENT HOSPITAL (AMBULATORY)
Dept: URBAN - METROPOLITAN AREA HOSPITAL 84 | Facility: HOSPITAL | Age: 73
End: 2023-01-29

## 2023-01-29 DIAGNOSIS — Z90.49 ACQUIRED ABSENCE OF OTHER SPECIFIED PARTS OF DIGESTIVE TRACT: ICD-10-CM

## 2023-01-29 DIAGNOSIS — K27.9 PEPTIC ULCER, SITE UNSPECIFIED, UNSPECIFIED AS ACUTE OR CHRO: ICD-10-CM

## 2023-01-29 DIAGNOSIS — R10.13 EPIGASTRIC PAIN: ICD-10-CM

## 2023-01-29 DIAGNOSIS — R93.3 ABNORMAL FINDINGS ON DIAGNOSTIC IMAGING OF OTHER PARTS OF DI: ICD-10-CM

## 2023-01-29 DIAGNOSIS — Z98.890 OTHER SPECIFIED POSTPROCEDURAL STATES: ICD-10-CM

## 2023-01-29 DIAGNOSIS — K29.80 DUODENITIS WITHOUT BLEEDING: ICD-10-CM

## 2023-01-29 LAB
ALBUMIN SERPL-MCNC: 3.6 G/DL (ref 3.5–5.2)
ALBUMIN/GLOB SERPL: 1.3 G/DL
ALP SERPL-CCNC: 54 U/L (ref 39–117)
ALT SERPL W P-5'-P-CCNC: 13 U/L (ref 1–33)
ANION GAP SERPL CALCULATED.3IONS-SCNC: 12 MMOL/L (ref 5–15)
ANION GAP SERPL CALCULATED.3IONS-SCNC: 9 MMOL/L (ref 5–15)
AST SERPL-CCNC: 13 U/L (ref 1–32)
BASOPHILS # BLD AUTO: 0 10*3/MM3 (ref 0–0.2)
BASOPHILS # BLD AUTO: 0 10*3/MM3 (ref 0–0.2)
BASOPHILS NFR BLD AUTO: 0.4 % (ref 0–1.5)
BASOPHILS NFR BLD AUTO: 0.5 % (ref 0–1.5)
BILIRUB SERPL-MCNC: 0.6 MG/DL (ref 0–1.2)
BUN SERPL-MCNC: 10 MG/DL (ref 8–23)
BUN SERPL-MCNC: 10 MG/DL (ref 8–23)
BUN/CREAT SERPL: 11.4 (ref 7–25)
BUN/CREAT SERPL: 11.5 (ref 7–25)
CALCIUM SPEC-SCNC: 8.9 MG/DL (ref 8.6–10.5)
CALCIUM SPEC-SCNC: 9.6 MG/DL (ref 8.6–10.5)
CHLORIDE SERPL-SCNC: 105 MMOL/L (ref 98–107)
CHLORIDE SERPL-SCNC: 105 MMOL/L (ref 98–107)
CO2 SERPL-SCNC: 24 MMOL/L (ref 22–29)
CO2 SERPL-SCNC: 25 MMOL/L (ref 22–29)
CREAT SERPL-MCNC: 0.87 MG/DL (ref 0.57–1)
CREAT SERPL-MCNC: 0.88 MG/DL (ref 0.57–1)
DEPRECATED RDW RBC AUTO: 43.3 FL (ref 37–54)
DEPRECATED RDW RBC AUTO: 45.5 FL (ref 37–54)
EGFRCR SERPLBLD CKD-EPI 2021: 69.5 ML/MIN/1.73
EGFRCR SERPLBLD CKD-EPI 2021: 70.5 ML/MIN/1.73
EOSINOPHIL # BLD AUTO: 0.1 10*3/MM3 (ref 0–0.4)
EOSINOPHIL # BLD AUTO: 0.1 10*3/MM3 (ref 0–0.4)
EOSINOPHIL NFR BLD AUTO: 1 % (ref 0.3–6.2)
EOSINOPHIL NFR BLD AUTO: 1.1 % (ref 0.3–6.2)
ERYTHROCYTE [DISTWIDTH] IN BLOOD BY AUTOMATED COUNT: 13.3 % (ref 12.3–15.4)
ERYTHROCYTE [DISTWIDTH] IN BLOOD BY AUTOMATED COUNT: 13.3 % (ref 12.3–15.4)
GLOBULIN UR ELPH-MCNC: 2.8 GM/DL
GLUCOSE BLDC GLUCOMTR-MCNC: 87 MG/DL (ref 70–105)
GLUCOSE BLDC GLUCOMTR-MCNC: 97 MG/DL (ref 70–105)
GLUCOSE SERPL-MCNC: 139 MG/DL (ref 65–99)
GLUCOSE SERPL-MCNC: 97 MG/DL (ref 65–99)
HCT VFR BLD AUTO: 36.2 % (ref 34–46.6)
HCT VFR BLD AUTO: 36.2 % (ref 34–46.6)
HGB BLD-MCNC: 11.6 G/DL (ref 12–15.9)
HGB BLD-MCNC: 12.3 G/DL (ref 12–15.9)
INR PPP: 1.05 (ref 0.93–1.1)
LYMPHOCYTES # BLD AUTO: 1.1 10*3/MM3 (ref 0.7–3.1)
LYMPHOCYTES # BLD AUTO: 1.2 10*3/MM3 (ref 0.7–3.1)
LYMPHOCYTES NFR BLD AUTO: 14.2 % (ref 19.6–45.3)
LYMPHOCYTES NFR BLD AUTO: 14.9 % (ref 19.6–45.3)
MCH RBC QN AUTO: 30 PG (ref 26.6–33)
MCH RBC QN AUTO: 31.6 PG (ref 26.6–33)
MCHC RBC AUTO-ENTMCNC: 32 G/DL (ref 31.5–35.7)
MCHC RBC AUTO-ENTMCNC: 34 G/DL (ref 31.5–35.7)
MCV RBC AUTO: 93 FL (ref 79–97)
MCV RBC AUTO: 93.7 FL (ref 79–97)
MONOCYTES # BLD AUTO: 0.8 10*3/MM3 (ref 0.1–0.9)
MONOCYTES # BLD AUTO: 0.8 10*3/MM3 (ref 0.1–0.9)
MONOCYTES NFR BLD AUTO: 10.5 % (ref 5–12)
MONOCYTES NFR BLD AUTO: 9.3 % (ref 5–12)
NEUTROPHILS NFR BLD AUTO: 5.5 10*3/MM3 (ref 1.7–7)
NEUTROPHILS NFR BLD AUTO: 6.4 10*3/MM3 (ref 1.7–7)
NEUTROPHILS NFR BLD AUTO: 73 % (ref 42.7–76)
NEUTROPHILS NFR BLD AUTO: 75.1 % (ref 42.7–76)
NRBC BLD AUTO-RTO: 0.1 /100 WBC (ref 0–0.2)
NRBC BLD AUTO-RTO: 0.2 /100 WBC (ref 0–0.2)
PLATELET # BLD AUTO: 238 10*3/MM3 (ref 140–450)
PLATELET # BLD AUTO: 246 10*3/MM3 (ref 140–450)
PMV BLD AUTO: 8.1 FL (ref 6–12)
PMV BLD AUTO: 8.9 FL (ref 6–12)
POTASSIUM SERPL-SCNC: 3.6 MMOL/L (ref 3.5–5.2)
POTASSIUM SERPL-SCNC: 4.2 MMOL/L (ref 3.5–5.2)
PROT SERPL-MCNC: 6.4 G/DL (ref 6–8.5)
PROTHROMBIN TIME: 10.8 SECONDS (ref 9.6–11.7)
RBC # BLD AUTO: 3.86 10*6/MM3 (ref 3.77–5.28)
RBC # BLD AUTO: 3.89 10*6/MM3 (ref 3.77–5.28)
SODIUM SERPL-SCNC: 139 MMOL/L (ref 136–145)
SODIUM SERPL-SCNC: 141 MMOL/L (ref 136–145)
WBC NRBC COR # BLD: 7.5 10*3/MM3 (ref 3.4–10.8)
WBC NRBC COR # BLD: 8.5 10*3/MM3 (ref 3.4–10.8)

## 2023-01-29 PROCEDURE — 99232 SBSQ HOSP IP/OBS MODERATE 35: CPT | Performed by: SURGERY

## 2023-01-29 PROCEDURE — 25010000002 MORPHINE PER 10 MG: Performed by: NURSE PRACTITIONER

## 2023-01-29 PROCEDURE — 87040 BLOOD CULTURE FOR BACTERIA: CPT | Performed by: FAMILY MEDICINE

## 2023-01-29 PROCEDURE — 85025 COMPLETE CBC W/AUTO DIFF WBC: CPT | Performed by: NURSE PRACTITIONER

## 2023-01-29 PROCEDURE — 85610 PROTHROMBIN TIME: CPT | Performed by: NURSE PRACTITIONER

## 2023-01-29 PROCEDURE — 25010000002 PIPERACILLIN SOD-TAZOBACTAM PER 1 G: Performed by: NURSE PRACTITIONER

## 2023-01-29 PROCEDURE — 25010000002 MORPHINE PER 10 MG: Performed by: FAMILY MEDICINE

## 2023-01-29 PROCEDURE — 82962 GLUCOSE BLOOD TEST: CPT

## 2023-01-29 PROCEDURE — 80053 COMPREHEN METABOLIC PANEL: CPT | Performed by: NURSE PRACTITIONER

## 2023-01-29 PROCEDURE — 25010000002 ONDANSETRON PER 1 MG: Performed by: NURSE PRACTITIONER

## 2023-01-29 PROCEDURE — 99232 SBSQ HOSP IP/OBS MODERATE 35: CPT | Mod: FS | Performed by: NURSE PRACTITIONER

## 2023-01-29 RX ORDER — INSULIN LISPRO 100 [IU]/ML
2-7 INJECTION, SOLUTION INTRAVENOUS; SUBCUTANEOUS EVERY 6 HOURS SCHEDULED
Status: DISCONTINUED | OUTPATIENT
Start: 2023-01-29 | End: 2023-01-31

## 2023-01-29 RX ORDER — LATANOPROST 50 UG/ML
1 SOLUTION/ DROPS OPHTHALMIC NIGHTLY
Status: DISCONTINUED | OUTPATIENT
Start: 2023-01-29 | End: 2023-02-02 | Stop reason: HOSPADM

## 2023-01-29 RX ORDER — DEXTROSE MONOHYDRATE 25 G/50ML
25 INJECTION, SOLUTION INTRAVENOUS
Status: DISCONTINUED | OUTPATIENT
Start: 2023-01-29 | End: 2023-02-02 | Stop reason: HOSPADM

## 2023-01-29 RX ORDER — NICOTINE POLACRILEX 4 MG
15 LOZENGE BUCCAL
Status: DISCONTINUED | OUTPATIENT
Start: 2023-01-29 | End: 2023-02-02 | Stop reason: HOSPADM

## 2023-01-29 RX ORDER — MORPHINE SULFATE 2 MG/ML
2 INJECTION, SOLUTION INTRAMUSCULAR; INTRAVENOUS
Status: DISCONTINUED | OUTPATIENT
Start: 2023-01-29 | End: 2023-02-02 | Stop reason: HOSPADM

## 2023-01-29 RX ORDER — OLANZAPINE 10 MG/2ML
1 INJECTION, POWDER, LYOPHILIZED, FOR SOLUTION INTRAMUSCULAR
Status: DISCONTINUED | OUTPATIENT
Start: 2023-01-29 | End: 2023-02-02 | Stop reason: HOSPADM

## 2023-01-29 RX ADMIN — ONDANSETRON 4 MG: 2 INJECTION INTRAMUSCULAR; INTRAVENOUS at 17:18

## 2023-01-29 RX ADMIN — ONDANSETRON 4 MG: 2 INJECTION INTRAMUSCULAR; INTRAVENOUS at 05:36

## 2023-01-29 RX ADMIN — PIPERACILLIN AND TAZOBACTAM 3.38 G: 3; .375 INJECTION, POWDER, LYOPHILIZED, FOR SOLUTION INTRAVENOUS at 08:32

## 2023-01-29 RX ADMIN — PIPERACILLIN AND TAZOBACTAM 3.38 G: 3; .375 INJECTION, POWDER, LYOPHILIZED, FOR SOLUTION INTRAVENOUS at 17:19

## 2023-01-29 RX ADMIN — PANTOPRAZOLE SODIUM 40 MG: 40 INJECTION, POWDER, LYOPHILIZED, FOR SOLUTION INTRAVENOUS at 20:25

## 2023-01-29 RX ADMIN — Medication 10 ML: at 20:25

## 2023-01-29 RX ADMIN — Medication 3 ML: at 08:55

## 2023-01-29 RX ADMIN — LATANOPROST 1 DROP: 50 SOLUTION OPHTHALMIC at 20:25

## 2023-01-29 RX ADMIN — Medication 10 ML: at 20:26

## 2023-01-29 RX ADMIN — SODIUM CHLORIDE, POTASSIUM CHLORIDE, SODIUM LACTATE AND CALCIUM CHLORIDE 100 ML/HR: 600; 310; 30; 20 INJECTION, SOLUTION INTRAVENOUS at 08:59

## 2023-01-29 RX ADMIN — PIPERACILLIN AND TAZOBACTAM 3.38 G: 3; .375 INJECTION, POWDER, LYOPHILIZED, FOR SOLUTION INTRAVENOUS at 01:01

## 2023-01-29 RX ADMIN — MORPHINE SULFATE 2 MG: 2 INJECTION, SOLUTION INTRAMUSCULAR; INTRAVENOUS at 17:58

## 2023-01-29 RX ADMIN — MORPHINE SULFATE 2 MG: 2 INJECTION, SOLUTION INTRAMUSCULAR; INTRAVENOUS at 15:43

## 2023-01-29 RX ADMIN — PANTOPRAZOLE SODIUM 40 MG: 40 INJECTION, POWDER, LYOPHILIZED, FOR SOLUTION INTRAVENOUS at 08:28

## 2023-01-29 RX ADMIN — MORPHINE SULFATE 2 MG: 2 INJECTION, SOLUTION INTRAMUSCULAR; INTRAVENOUS at 05:23

## 2023-01-30 ENCOUNTER — INPATIENT HOSPITAL (AMBULATORY)
Dept: URBAN - METROPOLITAN AREA HOSPITAL 84 | Facility: HOSPITAL | Age: 73
End: 2023-01-30

## 2023-01-30 DIAGNOSIS — K29.80 DUODENITIS WITHOUT BLEEDING: ICD-10-CM

## 2023-01-30 DIAGNOSIS — Z90.49 ACQUIRED ABSENCE OF OTHER SPECIFIED PARTS OF DIGESTIVE TRACT: ICD-10-CM

## 2023-01-30 DIAGNOSIS — R93.3 ABNORMAL FINDINGS ON DIAGNOSTIC IMAGING OF OTHER PARTS OF DI: ICD-10-CM

## 2023-01-30 DIAGNOSIS — R10.13 EPIGASTRIC PAIN: ICD-10-CM

## 2023-01-30 DIAGNOSIS — K27.9 PEPTIC ULCER, SITE UNSPECIFIED, UNSPECIFIED AS ACUTE OR CHRO: ICD-10-CM

## 2023-01-30 DIAGNOSIS — Z98.890 OTHER SPECIFIED POSTPROCEDURAL STATES: ICD-10-CM

## 2023-01-30 LAB
GLUCOSE BLDC GLUCOMTR-MCNC: 105 MG/DL (ref 70–105)
GLUCOSE BLDC GLUCOMTR-MCNC: 185 MG/DL (ref 70–105)
GLUCOSE BLDC GLUCOMTR-MCNC: 74 MG/DL (ref 70–105)
GLUCOSE BLDC GLUCOMTR-MCNC: 89 MG/DL (ref 70–105)
GLUCOSE BLDC GLUCOMTR-MCNC: 99 MG/DL (ref 70–105)

## 2023-01-30 PROCEDURE — 99232 SBSQ HOSP IP/OBS MODERATE 35: CPT | Performed by: SURGERY

## 2023-01-30 PROCEDURE — 82962 GLUCOSE BLOOD TEST: CPT

## 2023-01-30 PROCEDURE — 25010000002 PIPERACILLIN SOD-TAZOBACTAM PER 1 G: Performed by: NURSE PRACTITIONER

## 2023-01-30 PROCEDURE — 99232 SBSQ HOSP IP/OBS MODERATE 35: CPT | Performed by: NURSE PRACTITIONER

## 2023-01-30 RX ORDER — SUCRALFATE 1 G/1
1 TABLET ORAL
Status: DISCONTINUED | OUTPATIENT
Start: 2023-01-30 | End: 2023-02-02 | Stop reason: HOSPADM

## 2023-01-30 RX ADMIN — SUCRALFATE 1 G: 1 TABLET ORAL at 11:45

## 2023-01-30 RX ADMIN — PIPERACILLIN AND TAZOBACTAM 3.38 G: 3; .375 INJECTION, POWDER, LYOPHILIZED, FOR SOLUTION INTRAVENOUS at 09:21

## 2023-01-30 RX ADMIN — SUCRALFATE 1 G: 1 TABLET ORAL at 18:46

## 2023-01-30 RX ADMIN — PANTOPRAZOLE SODIUM 40 MG: 40 INJECTION, POWDER, LYOPHILIZED, FOR SOLUTION INTRAVENOUS at 09:21

## 2023-01-30 RX ADMIN — SUCRALFATE 1 G: 1 TABLET ORAL at 20:00

## 2023-01-30 RX ADMIN — LATANOPROST 1 DROP: 50 SOLUTION OPHTHALMIC at 20:02

## 2023-01-30 RX ADMIN — PANTOPRAZOLE SODIUM 40 MG: 40 INJECTION, POWDER, LYOPHILIZED, FOR SOLUTION INTRAVENOUS at 20:00

## 2023-01-30 RX ADMIN — SODIUM CHLORIDE, POTASSIUM CHLORIDE, SODIUM LACTATE AND CALCIUM CHLORIDE 125 ML/HR: 600; 310; 30; 20 INJECTION, SOLUTION INTRAVENOUS at 20:01

## 2023-01-30 RX ADMIN — PIPERACILLIN AND TAZOBACTAM 3.38 G: 3; .375 INJECTION, POWDER, LYOPHILIZED, FOR SOLUTION INTRAVENOUS at 02:08

## 2023-01-30 RX ADMIN — Medication 3 ML: at 09:21

## 2023-01-30 RX ADMIN — Medication 3 ML: at 20:00

## 2023-01-30 RX ADMIN — PIPERACILLIN AND TAZOBACTAM 3.38 G: 3; .375 INJECTION, POWDER, LYOPHILIZED, FOR SOLUTION INTRAVENOUS at 20:00

## 2023-01-30 RX ADMIN — Medication 10 ML: at 20:02

## 2023-01-31 LAB
ANION GAP SERPL CALCULATED.3IONS-SCNC: 13 MMOL/L (ref 5–15)
BASOPHILS # BLD AUTO: 0 10*3/MM3 (ref 0–0.2)
BASOPHILS NFR BLD AUTO: 0.7 % (ref 0–1.5)
BUN SERPL-MCNC: 9 MG/DL (ref 8–23)
BUN/CREAT SERPL: 9.4 (ref 7–25)
CALCIUM SPEC-SCNC: 9.6 MG/DL (ref 8.6–10.5)
CHLORIDE SERPL-SCNC: 105 MMOL/L (ref 98–107)
CO2 SERPL-SCNC: 28 MMOL/L (ref 22–29)
CREAT SERPL-MCNC: 0.96 MG/DL (ref 0.57–1)
DEPRECATED RDW RBC AUTO: 43.8 FL (ref 37–54)
EGFRCR SERPLBLD CKD-EPI 2021: 62.6 ML/MIN/1.73
EOSINOPHIL # BLD AUTO: 0.2 10*3/MM3 (ref 0–0.4)
EOSINOPHIL NFR BLD AUTO: 2.6 % (ref 0.3–6.2)
ERYTHROCYTE [DISTWIDTH] IN BLOOD BY AUTOMATED COUNT: 13.3 % (ref 12.3–15.4)
GLUCOSE BLDC GLUCOMTR-MCNC: 101 MG/DL (ref 70–105)
GLUCOSE BLDC GLUCOMTR-MCNC: 102 MG/DL (ref 70–105)
GLUCOSE BLDC GLUCOMTR-MCNC: 103 MG/DL (ref 70–105)
GLUCOSE BLDC GLUCOMTR-MCNC: 105 MG/DL (ref 70–105)
GLUCOSE BLDC GLUCOMTR-MCNC: 86 MG/DL (ref 70–105)
GLUCOSE SERPL-MCNC: 129 MG/DL (ref 65–99)
HCT VFR BLD AUTO: 42.5 % (ref 34–46.6)
HGB BLD-MCNC: 13.7 G/DL (ref 12–15.9)
LYMPHOCYTES # BLD AUTO: 1.3 10*3/MM3 (ref 0.7–3.1)
LYMPHOCYTES NFR BLD AUTO: 18.3 % (ref 19.6–45.3)
MCH RBC QN AUTO: 30.4 PG (ref 26.6–33)
MCHC RBC AUTO-ENTMCNC: 32.3 G/DL (ref 31.5–35.7)
MCV RBC AUTO: 94.1 FL (ref 79–97)
MONOCYTES # BLD AUTO: 0.6 10*3/MM3 (ref 0.1–0.9)
MONOCYTES NFR BLD AUTO: 8.3 % (ref 5–12)
NEUTROPHILS NFR BLD AUTO: 4.8 10*3/MM3 (ref 1.7–7)
NEUTROPHILS NFR BLD AUTO: 70.1 % (ref 42.7–76)
NRBC BLD AUTO-RTO: 0 /100 WBC (ref 0–0.2)
PLATELET # BLD AUTO: 325 10*3/MM3 (ref 140–450)
PMV BLD AUTO: 8 FL (ref 6–12)
POTASSIUM SERPL-SCNC: 3.7 MMOL/L (ref 3.5–5.2)
RBC # BLD AUTO: 4.51 10*6/MM3 (ref 3.77–5.28)
SODIUM SERPL-SCNC: 146 MMOL/L (ref 136–145)
WBC NRBC COR # BLD: 6.9 10*3/MM3 (ref 3.4–10.8)

## 2023-01-31 PROCEDURE — 82962 GLUCOSE BLOOD TEST: CPT

## 2023-01-31 PROCEDURE — 80048 BASIC METABOLIC PNL TOTAL CA: CPT | Performed by: FAMILY MEDICINE

## 2023-01-31 PROCEDURE — 99231 SBSQ HOSP IP/OBS SF/LOW 25: CPT | Performed by: SURGERY

## 2023-01-31 PROCEDURE — 25010000002 ONDANSETRON PER 1 MG: Performed by: NURSE PRACTITIONER

## 2023-01-31 PROCEDURE — 25010000002 PIPERACILLIN SOD-TAZOBACTAM PER 1 G: Performed by: NURSE PRACTITIONER

## 2023-01-31 PROCEDURE — 85025 COMPLETE CBC W/AUTO DIFF WBC: CPT | Performed by: FAMILY MEDICINE

## 2023-01-31 RX ORDER — DULOXETIN HYDROCHLORIDE 30 MG/1
60 CAPSULE, DELAYED RELEASE ORAL DAILY
Status: DISCONTINUED | OUTPATIENT
Start: 2023-01-31 | End: 2023-02-02 | Stop reason: HOSPADM

## 2023-01-31 RX ORDER — METOPROLOL SUCCINATE 25 MG/1
25 TABLET, EXTENDED RELEASE ORAL
Status: DISCONTINUED | OUTPATIENT
Start: 2023-01-31 | End: 2023-02-02 | Stop reason: HOSPADM

## 2023-01-31 RX ORDER — LEVOTHYROXINE SODIUM 0.1 MG/1
100 TABLET ORAL
Status: DISCONTINUED | OUTPATIENT
Start: 2023-01-31 | End: 2023-02-02 | Stop reason: HOSPADM

## 2023-01-31 RX ORDER — INSULIN LISPRO 100 [IU]/ML
2-7 INJECTION, SOLUTION INTRAVENOUS; SUBCUTANEOUS
Status: DISCONTINUED | OUTPATIENT
Start: 2023-01-31 | End: 2023-02-02 | Stop reason: HOSPADM

## 2023-01-31 RX ADMIN — ONDANSETRON 4 MG: 2 INJECTION INTRAMUSCULAR; INTRAVENOUS at 21:30

## 2023-01-31 RX ADMIN — PANTOPRAZOLE SODIUM 40 MG: 40 INJECTION, POWDER, LYOPHILIZED, FOR SOLUTION INTRAVENOUS at 21:30

## 2023-01-31 RX ADMIN — DULOXETINE HYDROCHLORIDE 60 MG: 30 CAPSULE, DELAYED RELEASE ORAL at 10:22

## 2023-01-31 RX ADMIN — METOPROLOL SUCCINATE 25 MG: 25 TABLET, EXTENDED RELEASE ORAL at 10:22

## 2023-01-31 RX ADMIN — SUCRALFATE 1 G: 1 TABLET ORAL at 14:02

## 2023-01-31 RX ADMIN — PANTOPRAZOLE SODIUM 40 MG: 40 INJECTION, POWDER, LYOPHILIZED, FOR SOLUTION INTRAVENOUS at 10:22

## 2023-01-31 RX ADMIN — PIPERACILLIN AND TAZOBACTAM 3.38 G: 3; .375 INJECTION, POWDER, LYOPHILIZED, FOR SOLUTION INTRAVENOUS at 04:02

## 2023-01-31 RX ADMIN — PIPERACILLIN AND TAZOBACTAM 3.38 G: 3; .375 INJECTION, POWDER, LYOPHILIZED, FOR SOLUTION INTRAVENOUS at 21:29

## 2023-01-31 RX ADMIN — Medication 3 ML: at 21:35

## 2023-01-31 RX ADMIN — Medication 3 ML: at 10:22

## 2023-01-31 RX ADMIN — PIPERACILLIN AND TAZOBACTAM 3.38 G: 3; .375 INJECTION, POWDER, LYOPHILIZED, FOR SOLUTION INTRAVENOUS at 14:02

## 2023-01-31 RX ADMIN — LATANOPROST 1 DROP: 50 SOLUTION OPHTHALMIC at 21:34

## 2023-01-31 RX ADMIN — SUCRALFATE 1 G: 1 TABLET ORAL at 18:35

## 2023-01-31 RX ADMIN — LEVOTHYROXINE SODIUM 100 MCG: 100 TABLET ORAL at 10:22

## 2023-01-31 RX ADMIN — SUCRALFATE 1 G: 1 TABLET ORAL at 10:27

## 2023-02-01 LAB
GLUCOSE BLDC GLUCOMTR-MCNC: 106 MG/DL (ref 70–105)
GLUCOSE BLDC GLUCOMTR-MCNC: 113 MG/DL (ref 70–105)
GLUCOSE BLDC GLUCOMTR-MCNC: 133 MG/DL (ref 70–105)
GLUCOSE BLDC GLUCOMTR-MCNC: 98 MG/DL (ref 70–105)

## 2023-02-01 PROCEDURE — 82962 GLUCOSE BLOOD TEST: CPT

## 2023-02-01 PROCEDURE — 25010000002 PIPERACILLIN SOD-TAZOBACTAM PER 1 G: Performed by: NURSE PRACTITIONER

## 2023-02-01 RX ADMIN — Medication 3 ML: at 20:44

## 2023-02-01 RX ADMIN — PANTOPRAZOLE SODIUM 40 MG: 40 INJECTION, POWDER, LYOPHILIZED, FOR SOLUTION INTRAVENOUS at 08:24

## 2023-02-01 RX ADMIN — SUCRALFATE 1 G: 1 TABLET ORAL at 12:23

## 2023-02-01 RX ADMIN — PANTOPRAZOLE SODIUM 40 MG: 40 INJECTION, POWDER, LYOPHILIZED, FOR SOLUTION INTRAVENOUS at 20:44

## 2023-02-01 RX ADMIN — SUCRALFATE 1 G: 1 TABLET ORAL at 08:24

## 2023-02-01 RX ADMIN — SUCRALFATE 1 G: 1 TABLET ORAL at 17:19

## 2023-02-01 RX ADMIN — METOPROLOL SUCCINATE 25 MG: 25 TABLET, EXTENDED RELEASE ORAL at 08:23

## 2023-02-01 RX ADMIN — Medication 3 ML: at 08:24

## 2023-02-01 RX ADMIN — LEVOTHYROXINE SODIUM 100 MCG: 100 TABLET ORAL at 05:25

## 2023-02-01 RX ADMIN — PIPERACILLIN AND TAZOBACTAM 3.38 G: 3; .375 INJECTION, POWDER, LYOPHILIZED, FOR SOLUTION INTRAVENOUS at 12:23

## 2023-02-01 RX ADMIN — LATANOPROST 1 DROP: 50 SOLUTION OPHTHALMIC at 20:45

## 2023-02-01 RX ADMIN — Medication 10 ML: at 20:45

## 2023-02-01 RX ADMIN — PIPERACILLIN AND TAZOBACTAM 3.38 G: 3; .375 INJECTION, POWDER, LYOPHILIZED, FOR SOLUTION INTRAVENOUS at 05:25

## 2023-02-01 RX ADMIN — SUCRALFATE 1 G: 1 TABLET ORAL at 20:44

## 2023-02-01 RX ADMIN — DULOXETINE HYDROCHLORIDE 60 MG: 30 CAPSULE, DELAYED RELEASE ORAL at 08:24

## 2023-02-01 RX ADMIN — PIPERACILLIN AND TAZOBACTAM 3.38 G: 3; .375 INJECTION, POWDER, LYOPHILIZED, FOR SOLUTION INTRAVENOUS at 20:44

## 2023-02-01 NOTE — CASE MANAGEMENT/SOCIAL WORK
Continued Stay Note  EBONIE Wild     Patient Name: Katty Ramirez  MRN: [id number]  Today's Date: 2/1/2023    Admit Date: 1/28/2023    Plan: D/C plan: Anticipate home   Discharge Plan     Row Name 02/01/23 1104       Plan    Plan D/C plan: Anticipate home    Patient/Family in Agreement with Plan yes    Plan Comments Barrier to d/c: IV abx, last day 2/2, IVF, full liquid diet to be advanced.                   Phone communication or documentation only - no physical contact with patient or family.      Edenilson San RN

## 2023-02-01 NOTE — PLAN OF CARE
Goal Outcome Evaluation:              Outcome Evaluation: Patient currently resting abed, no distess noted. Patient stated that she is feeling better but hopes to complete the IV antibiotics before being discharged.

## 2023-02-01 NOTE — PLAN OF CARE
Goal Outcome Evaluation:   Patient has rested well throughout the shift with no c/o pain. Will finish up ABT tomorrow and hopeful for discharge. Diet advanced to bland diet and patient tolerating so fa. VSS, continue to monitor.

## 2023-02-02 ENCOUNTER — READMISSION MANAGEMENT (OUTPATIENT)
Dept: CALL CENTER | Facility: HOSPITAL | Age: 73
End: 2023-02-02
Payer: MEDICARE

## 2023-02-02 VITALS
HEIGHT: 63 IN | DIASTOLIC BLOOD PRESSURE: 58 MMHG | HEART RATE: 68 BPM | RESPIRATION RATE: 16 BRPM | TEMPERATURE: 97.6 F | WEIGHT: 192.9 LBS | OXYGEN SATURATION: 96 % | SYSTOLIC BLOOD PRESSURE: 128 MMHG | BODY MASS INDEX: 34.18 KG/M2

## 2023-02-02 LAB
GLUCOSE BLDC GLUCOMTR-MCNC: 104 MG/DL (ref 70–105)
GLUCOSE BLDC GLUCOMTR-MCNC: 114 MG/DL (ref 70–105)

## 2023-02-02 PROCEDURE — 82962 GLUCOSE BLOOD TEST: CPT

## 2023-02-02 PROCEDURE — 25010000002 PIPERACILLIN SOD-TAZOBACTAM PER 1 G: Performed by: NURSE PRACTITIONER

## 2023-02-02 RX ORDER — METOPROLOL SUCCINATE 25 MG/1
25 TABLET, EXTENDED RELEASE ORAL
Qty: 30 TABLET | Refills: 2 | Status: SHIPPED | OUTPATIENT
Start: 2023-02-03

## 2023-02-02 RX ORDER — SUCRALFATE 1 G/1
1 TABLET ORAL
Qty: 120 TABLET | Refills: 2 | Status: SHIPPED | OUTPATIENT
Start: 2023-02-02 | End: 2023-03-22

## 2023-02-02 RX ORDER — PANTOPRAZOLE SODIUM 40 MG/1
40 TABLET, DELAYED RELEASE ORAL 2 TIMES DAILY
Qty: 60 TABLET | Refills: 2 | Status: SHIPPED | OUTPATIENT
Start: 2023-02-02

## 2023-02-02 RX ADMIN — PANTOPRAZOLE SODIUM 40 MG: 40 INJECTION, POWDER, LYOPHILIZED, FOR SOLUTION INTRAVENOUS at 08:23

## 2023-02-02 RX ADMIN — Medication 3 ML: at 08:24

## 2023-02-02 RX ADMIN — Medication 10 ML: at 05:21

## 2023-02-02 RX ADMIN — DULOXETINE HYDROCHLORIDE 60 MG: 30 CAPSULE, DELAYED RELEASE ORAL at 08:24

## 2023-02-02 RX ADMIN — PIPERACILLIN AND TAZOBACTAM 3.38 G: 3; .375 INJECTION, POWDER, LYOPHILIZED, FOR SOLUTION INTRAVENOUS at 12:16

## 2023-02-02 RX ADMIN — PIPERACILLIN AND TAZOBACTAM 3.38 G: 3; .375 INJECTION, POWDER, LYOPHILIZED, FOR SOLUTION INTRAVENOUS at 05:20

## 2023-02-02 RX ADMIN — SUCRALFATE 1 G: 1 TABLET ORAL at 12:16

## 2023-02-02 RX ADMIN — SUCRALFATE 1 G: 1 TABLET ORAL at 08:24

## 2023-02-02 RX ADMIN — METOPROLOL SUCCINATE 25 MG: 25 TABLET, EXTENDED RELEASE ORAL at 08:23

## 2023-02-02 RX ADMIN — LEVOTHYROXINE SODIUM 100 MCG: 100 TABLET ORAL at 05:20

## 2023-02-02 NOTE — CASE MANAGEMENT/SOCIAL WORK
Continued Stay Note  EBONIE Wild     Patient Name: Katty Ramirez  MRN: [id number]  Today's Date: 2/2/2023    Admit Date: 1/28/2023    Plan: D/C plan: Anticipate home   Discharge Plan     Row Name 02/02/23 1332       Plan    Plan D/C plan: Anticipate home    Patient/Family in Agreement with Plan yes    Plan Comments Anticipate home today after last dose of IV Zosyn.                   Expected Discharge Date and Time     Expected Discharge Date Expected Discharge Time    Feb 2, 2023         Met with patient in room wearing PPE: mask  Maintained distance greater than six feet and spent less than 15 minutes in the room.          Edenilson San RN

## 2023-02-02 NOTE — PLAN OF CARE
Problem: Adult Inpatient Plan of Care  Goal: Plan of Care Review  Outcome: Ongoing, Progressing  Flowsheets (Taken 2/2/2023 1561)  Plan of Care Reviewed With: patient  Goal: Patient-Specific Goal (Individualized)  Outcome: Ongoing, Progressing  Goal: Absence of Hospital-Acquired Illness or Injury  Outcome: Ongoing, Progressing  Goal: Optimal Comfort and Wellbeing  Outcome: Ongoing, Progressing  Goal: Readiness for Transition of Care  Outcome: Ongoing, Progressing     Problem: Asthma Comorbidity  Goal: Maintenance of Asthma Control  Outcome: Ongoing, Progressing     Problem: Behavioral Health Comorbidity  Goal: Maintenance of Behavioral Health Symptom Control  Outcome: Ongoing, Progressing     Problem: COPD (Chronic Obstructive Pulmonary Disease) Comorbidity  Goal: Maintenance of COPD Symptom Control  Outcome: Ongoing, Progressing     Problem: Diabetes Comorbidity  Goal: Blood Glucose Level Within Targeted Range  Outcome: Ongoing, Progressing     Problem: Heart Failure Comorbidity  Goal: Maintenance of Heart Failure Symptom Control  Outcome: Ongoing, Progressing     Problem: Hypertension Comorbidity  Goal: Blood Pressure in Desired Range  Outcome: Ongoing, Progressing     Problem: Obstructive Sleep Apnea Risk or Actual Comorbidity Management  Goal: Unobstructed Breathing During Sleep  Outcome: Ongoing, Progressing     Problem: Osteoarthritis Comorbidity  Goal: Maintenance of Osteoarthritis Symptom Control  Outcome: Ongoing, Progressing     Problem: Pain Chronic (Persistent) (Comorbidity Management)  Goal: Acceptable Pain Control and Functional Ability  Outcome: Ongoing, Progressing     Problem: Seizure Disorder Comorbidity  Goal: Maintenance of Seizure Control  Outcome: Ongoing, Progressing   Goal Outcome Evaluation:  Plan of Care Reviewed With: patient         Patient to d/c home. No complaints at this time.

## 2023-02-02 NOTE — DISCHARGE SUMMARY
Date of Discharge:  2/2/2023    Discharge Diagnosis:   Acute abdominal pain  Acute Duotab Hidalgo  Peptic ulcer disease  GERD  History of Clement's esophageal  History of Nissen fundoplication  Chronic kidney disease stage II  Hypertension  Acquired hypothyroid  DJD  Diabetes type 2  Peripheral neuropathy  Diabetic dyslipidemia    Presenting Problem/History of Present Illness  Active Hospital Problems    Diagnosis  POA   • **Duodenitis without bleeding [K29.80]  Yes      Resolved Hospital Problems   No resolved problems to display.          Hospital Course    Patient is a 72 y.o. female presented admit through ER on 1/27/2023 with c/o sharp epigastric pain area and radiated to her back, nausea, and bloating that has persistently worsened over the last couple of days.  She admits to having some pain over the last few weeks but it would come and go, but the last 2 days has been pretty constant.  Patient was treated with PPI twice a day supportive care with n.p.o. and pain medication.  Surgery was consulted regarding possible duodenum perforation which recommendation was surgical intervention for severe ulceration of the second portion of the duodenum with contained perforation is mostly a damage control surgery.  Therefore she was treated with supportive care and has improved and tolerated diet.  I will follow the patient she has been afebrile with soft abdomen no elevated white count so they recommend continue the PPI twice daily for minimum of 8 weeks as well as Carafate 1 g 4 times daily also avoid aspirin and NSAIDs.  She has a follow-up appointment with PCP in 2 weeks is agreeable to this plan at discharge.    Procedures Performed         Consults:   Consults     Date and Time Order Name Status Description    1/28/2023  8:29 AM Gastroenterology (on-call MD unless specified)      1/28/2023  8:25 AM Surgery (on-call MD unless specified) Completed           Pertinent Test Results:    Lab Results (most recent)      Procedure Component Value Units Date/Time    Blood Culture - Blood, Arm, Left [[id number]]  (Normal) Collected: 01/29/23 1248    Specimen: Blood from Arm, Left Updated: 02/02/23 1316     Blood Culture No growth at 4 days    Blood Culture - Blood, Arm, Right [[id number]]  (Normal) Collected: 01/29/23 1248    Specimen: Blood from Arm, Right Updated: 02/02/23 1316     Blood Culture No growth at 4 days    POC Glucose Once [[id number]]  (Abnormal) Collected: 02/02/23 1139    Specimen: Blood Updated: 02/02/23 1140     Glucose 114 mg/dL      Comment: Serial Number: [id number]:  [id number]       POC Glucose Once [[id number]]  (Normal) Collected: 02/02/23 0658    Specimen: Blood Updated: 02/02/23 0700     Glucose 104 mg/dL      Comment: Serial Number: [id number]:  [id number]       Basic Metabolic Panel [[id number]]  (Abnormal) Collected: 01/31/23 0851    Specimen: Blood Updated: 01/31/23 0919     Glucose 129 mg/dL      BUN 9 mg/dL      Creatinine 0.96 mg/dL      Sodium 146 mmol/L      Potassium 3.7 mmol/L      Chloride 105 mmol/L      CO2 28.0 mmol/L      Calcium 9.6 mg/dL      BUN/Creatinine Ratio 9.4     Anion Gap 13.0 mmol/L      eGFR 62.6 mL/min/1.73     Narrative:      GFR Normal >60  Chronic Kidney Disease <60  Kidney Failure <15    The GFR formula is only valid for adults with stable renal function between ages 18 and 70.    CBC & Differential [[id number]]  (Abnormal) Collected: 01/31/23 0824    Specimen: Blood Updated: 01/31/23 0901    Narrative:      The following orders were created for panel order CBC & Differential.  Procedure                               Abnormality         Status                     ---------                               -----------         ------                     CBC Auto Differential[[id number]]        Abnormal            Final result                 Please view results for these tests on the individual orders.    CBC Auto Differential [[id number]]  (Abnormal) Collected: 01/31/23  0824    Specimen: Blood Updated: 01/31/23 0901     WBC 6.90 10*3/mm3      RBC 4.51 10*6/mm3      Hemoglobin 13.7 g/dL      Hematocrit 42.5 %      MCV 94.1 fL      MCH 30.4 pg      MCHC 32.3 g/dL      RDW 13.3 %      RDW-SD 43.8 fl      MPV 8.0 fL      Platelets 325 10*3/mm3      Neutrophil % 70.1 %      Lymphocyte % 18.3 %      Monocyte % 8.3 %      Eosinophil % 2.6 %      Basophil % 0.7 %      Neutrophils, Absolute 4.80 10*3/mm3      Lymphocytes, Absolute 1.30 10*3/mm3      Monocytes, Absolute 0.60 10*3/mm3      Eosinophils, Absolute 0.20 10*3/mm3      Basophils, Absolute 0.00 10*3/mm3      nRBC 0.0 /100 WBC     Basic Metabolic Panel [[id number]]  (Normal) Collected: 01/29/23 2252    Specimen: Blood from Arm, Right Updated: 01/29/23 2327     Glucose 97 mg/dL      BUN 10 mg/dL      Creatinine 0.87 mg/dL      Sodium 141 mmol/L      Potassium 3.6 mmol/L      Chloride 105 mmol/L      CO2 24.0 mmol/L      Calcium 8.9 mg/dL      BUN/Creatinine Ratio 11.5     Anion Gap 12.0 mmol/L      eGFR 70.5 mL/min/1.73     Narrative:      GFR Normal >60  Chronic Kidney Disease <60  Kidney Failure <15    The GFR formula is only valid for adults with stable renal function between ages 18 and 70.    CBC & Differential [[id number]]  (Abnormal) Collected: 01/29/23 2252    Specimen: Blood from Arm, Right Updated: 01/29/23 2311    Narrative:      The following orders were created for panel order CBC & Differential.  Procedure                               Abnormality         Status                     ---------                               -----------         ------                     CBC Auto Differential[[id number]]        Abnormal            Final result                 Please view results for these tests on the individual orders.    CBC Auto Differential [[id number]]  (Abnormal) Collected: 01/29/23 2252    Specimen: Blood from Arm, Right Updated: 01/29/23 2311     WBC 7.50 10*3/mm3      RBC 3.86 10*6/mm3      Hemoglobin 11.6 g/dL       Hematocrit 36.2 %      MCV 93.7 fL      MCH 30.0 pg      MCHC 32.0 g/dL      RDW 13.3 %      RDW-SD 43.3 fl      MPV 8.1 fL      Platelets 246 10*3/mm3      Neutrophil % 73.0 %      Lymphocyte % 14.9 %      Monocyte % 10.5 %      Eosinophil % 1.1 %      Basophil % 0.5 %      Neutrophils, Absolute 5.50 10*3/mm3      Lymphocytes, Absolute 1.10 10*3/mm3      Monocytes, Absolute 0.80 10*3/mm3      Eosinophils, Absolute 0.10 10*3/mm3      Basophils, Absolute 0.00 10*3/mm3      nRBC 0.1 /100 WBC     Comprehensive Metabolic Panel [[id number]]  (Abnormal) Collected: 01/29/23 0436    Specimen: Blood Updated: 01/29/23 0615     Glucose 139 mg/dL      BUN 10 mg/dL      Creatinine 0.88 mg/dL      Sodium 139 mmol/L      Potassium 4.2 mmol/L      Chloride 105 mmol/L      CO2 25.0 mmol/L      Calcium 9.6 mg/dL      Total Protein 6.4 g/dL      Albumin 3.6 g/dL      ALT (SGPT) 13 U/L      AST (SGOT) 13 U/L      Alkaline Phosphatase 54 U/L      Total Bilirubin 0.6 mg/dL      Globulin 2.8 gm/dL      A/G Ratio 1.3 g/dL      BUN/Creatinine Ratio 11.4     Anion Gap 9.0 mmol/L      eGFR 69.5 mL/min/1.73     Narrative:      GFR Normal >60  Chronic Kidney Disease <60  Kidney Failure <15    The GFR formula is only valid for adults with stable renal function between ages 18 and 70.    Protime-INR [[id number]]  (Normal) Collected: 01/29/23 0436    Specimen: Blood Updated: 01/29/23 0603     Protime 10.8 Seconds      INR 1.05    Urinalysis With Culture If Indicated - Urine, Clean Catch [[id number]]  (Abnormal) Collected: 01/28/23 0814    Specimen: Urine, Clean Catch Updated: 01/28/23 0820     Color, UA Yellow     Appearance, UA Clear     pH, UA 8.0     Specific Gravity, UA 1.039     Glucose, UA Negative     Ketones, UA Negative     Bilirubin, UA Negative     Blood, UA Negative     Protein, UA Trace     Leuk Esterase, UA Negative     Nitrite, UA Negative     Urobilinogen, UA 1.0 E.U./dL    Narrative:      In absence of clinical symptoms, the presence  of pyuria, bacteria, and/or nitrites on the urinalysis result does not correlate with infection.  Urine microscopic not indicated.    Amylase [[id number]]  (Normal) Collected: 01/28/23 0643    Specimen: Blood Updated: 01/28/23 0754     Amylase 94 U/L     COVID-19 and FLU A/B PCR - Swab, Nasopharynx [[id number]]  (Normal) Collected: 01/28/23 0654    Specimen: Swab from Nasopharynx Updated: 01/28/23 0722     COVID19 Not Detected     Influenza A PCR Not Detected     Influenza B PCR Not Detected    Narrative:      Fact sheet for providers: https://www.fda.gov/media/392259/download    Fact sheet for patients: https://www.fda.gov/media/148677/download    Test performed by PCR.    Comprehensive Metabolic Panel [[id number]]  (Abnormal) Collected: 01/28/23 0643    Specimen: Blood Updated: 01/28/23 0712     Glucose 181 mg/dL      BUN 16 mg/dL      Creatinine 0.99 mg/dL      Sodium 140 mmol/L      Potassium 4.1 mmol/L      Chloride 102 mmol/L      CO2 26.0 mmol/L      Calcium 9.2 mg/dL      Total Protein 7.5 g/dL      Albumin 4.1 g/dL      ALT (SGPT) 15 U/L      AST (SGOT) 14 U/L      Alkaline Phosphatase 59 U/L      Total Bilirubin 0.7 mg/dL      Globulin 3.4 gm/dL      A/G Ratio 1.2 g/dL      BUN/Creatinine Ratio 16.2     Anion Gap 12.0 mmol/L      eGFR 60.3 mL/min/1.73     Narrative:      GFR Normal >60  Chronic Kidney Disease <60  Kidney Failure <15    The GFR formula is only valid for adults with stable renal function between ages 18 and 70.    Lipase [[id number]]  (Abnormal) Collected: 01/28/23 0643    Specimen: Blood Updated: 01/28/23 0712     Lipase 139 U/L                 Condition on Discharge:  Stable    Vital Signs  Temp:  [97.3 °F (36.3 °C)-98 °F (36.7 °C)] 97.6 °F (36.4 °C)  Heart Rate:  [68-78] 68  Resp:  [16-20] 16  BP: (120-155)/(58-82) 128/58      Physical Exam  Vitals reviewed.   Constitutional:       Appearance: She is not ill-appearing.   HENT:      Head: Normocephalic and atraumatic.      Right Ear: External  ear normal.      Left Ear: External ear normal.      Nose: Nose normal.      Mouth/Throat:      Mouth: Mucous membranes are moist.   Eyes:      General:         Right eye: No discharge.         Left eye: No discharge.   Cardiovascular:      Rate and Rhythm: Normal rate and regular rhythm.      Pulses: Normal pulses.      Heart sounds: Normal heart sounds.   Pulmonary:      Effort: Pulmonary effort is normal.      Breath sounds: Normal breath sounds.   Abdominal:      General: Bowel sounds are normal.      Palpations: Abdomen is soft.   Musculoskeletal:         General: Normal range of motion.      Cervical back: Normal range of motion.   Skin:     General: Skin is dry.   Neurological:      Mental Status: She is alert and oriented to person, place, and time.   Psychiatric:         Behavior: Behavior normal.              Discharge Disposition  Home or Self Care    Discharge Medications     Discharge Medications      New Medications      Instructions Start Date   sucralfate 1 g tablet  Commonly known as: CARAFATE   1 g, Oral, 4 Times Daily Before Meals & Nightly         Changes to Medications      Instructions Start Date   metoprolol succinate XL 25 MG 24 hr tablet  Commonly known as: TOPROL-XL  What changed:   · medication strength  · how much to take  · when to take this   25 mg, Oral, Every 24 Hours Scheduled   Start Date: February 3, 2023     pantoprazole 40 MG EC tablet  Commonly known as: Protonix  What changed: when to take this   40 mg, Oral, 2 Times Daily         Continue These Medications      Instructions Start Date   DULoxetine 60 MG capsule  Commonly known as: CYMBALTA   60 mg, Oral, Daily      fenofibrate 160 MG tablet   160 mg, Oral, Daily      latanoprost 0.005 % ophthalmic solution  Commonly known as: XALATAN   1 drop, Both Eyes, Nightly      levothyroxine 100 MCG tablet  Commonly known as: SYNTHROID, LEVOTHROID   TAKE 1 TABLET DAILY      Ozempic (0.25 or 0.5 MG/DOSE) 2 MG/1.5ML solution  pen-injector  Generic drug: Semaglutide(0.25 or 0.5MG/DOS)   0.5 mg, Subcutaneous, Weekly      pravastatin 40 MG tablet  Commonly known as: PRAVACHOL   40 mg, Oral, Nightly      Vitamin D3 50 MCG (2000 UT) capsule   TAKE ONE CAPSULE BY MOUTH DAILY         Stop These Medications    amLODIPine-benazepril 10-20 MG per capsule  Commonly known as: LOTREL     aspirin 81 MG EC tablet     furosemide 20 MG tablet  Commonly known as: LASIX            Discharge Diet:   Diet Instructions     Diet: Consistent Carbohydrate      Discharge Diet: Consistent Carbohydrate    Low fat          Activity at Discharge:   Activity Instructions     Gradually Increase Activity Until at Pre-Hospitalization Level            Follow-up Appointments  Future Appointments   Date Time Provider Department Center   3/22/2023 10:30 AM GIOVANNA Juan DPM [facility]   5/30/2023 10:30 AM [facility]   5/30/2023 11:15 AM ROOM 1,  [facility] None     Additional Instructions for the Follow-ups that You Need to Schedule     Discharge Follow-up with PCP   As directed       Currently Documented PCP:    Jose Brenner MD    PCP Phone Number:    [phone]     Follow Up Details: 2/10 at 1040               Test Results Pending at Discharge  Pending Labs     Order Current Status    Blood Culture - Blood, Arm, Left Preliminary result    Blood Culture - Blood, Arm, Right Preliminary result           MARYAM Reagan  02/02/23  14:44 EST    Time: Discharge 25 min

## 2023-02-03 LAB
BACTERIA SPEC AEROBE CULT: NORMAL
BACTERIA SPEC AEROBE CULT: NORMAL

## 2023-02-03 NOTE — CASE MANAGEMENT/SOCIAL WORK
Case Management Discharge Note      Final Note: Home    Provided Post Acute Provider List?: N/A  N/A Provider List Comment: Anticipate home    Selected Continued Care - Discharged on 2/2/2023 Admission date: 1/28/2023 - Discharge disposition: Home or Self Care            Transportation Services  Private: Car    Final Discharge Disposition Code: 01 - home or self-care

## 2023-02-03 NOTE — OUTREACH NOTE
Prep Survey    Flowsheet Row Responses   [facility] discharged from? [facility]   Is LACE score < 7 ? No   Eligibility Readm Mgmt   Discharge diagnosis Duodenitis    Does the patient have one of the following disease processes/diagnoses(primary or secondary)? Other   Does the patient have Home health ordered? No   Is there a DME ordered? No   Prep survey completed? Yes          KELLY ZHU - Registered Nurse

## 2023-02-07 ENCOUNTER — READMISSION MANAGEMENT (OUTPATIENT)
Dept: CALL CENTER | Facility: HOSPITAL | Age: 73
End: 2023-02-07
Payer: MEDICARE

## 2023-02-07 NOTE — OUTREACH NOTE
Medical Week 1 Survey    Flowsheet Row Responses   [facility] patient discharged from? [facility]   Does the patient have one of the following disease processes/diagnoses(primary or secondary)? Other   Week 1 attempt successful? Yes   Call start time 1601   Call end time 1606   Discharge diagnosis Duodenitis    Meds reviewed with patient/caregiver? Yes   Is the patient having any side effects they believe may be caused by any medication additions or changes? No   Does the patient have all medications ordered at discharge? Yes   Is the patient taking all medications as directed (includes completed medication regime)? No   What is preventing the patient from taking all medications as directed? Other  [Pt unaware of need to discontinue Lotrel so she has continued--will call her PCP to discuss]   Nursing Interventions Advised patient to call provider, Nurse provided patient education   Medication comments Metoprolol was decreased at discharge--pt aware    Comments regarding appointments Upcoming EGD scheduled   Does the patient have a primary care provider?  Yes   Does the patient have an appointment with their PCP within 7 days of discharge? Yes   Comments regarding PCP PCP on 2/10/23   Has the patient kept scheduled appointments due by today? N/A   Has home health visited the patient within 72 hours of discharge? N/A   Psychosocial issues? No   Did the patient receive a copy of their discharge instructions? Yes   Nursing interventions Reviewed instructions with patient   What is the patient's perception of their health status since discharge? Same  [reports she is still not at 100%, still some GI concerns and her BP has been 160s w/ med changes and also c/o cluster type HA.Encouraged to call PCP as BP medications were decreased and one was d/c'd but she has continued.  She will call tomorrow.]   Is the patient/caregiver able to teach back signs and symptoms related to disease process for when to call PCP? Yes   Is  the patient/caregiver able to teach back signs and symptoms related to disease process for when to call 911? Yes   Week 1 call completed? Yes          VANE PAIZ - Registered Nurse

## 2023-02-15 ENCOUNTER — READMISSION MANAGEMENT (OUTPATIENT)
Dept: CALL CENTER | Facility: HOSPITAL | Age: 73
End: 2023-02-15
Payer: MEDICARE

## 2023-02-15 NOTE — OUTREACH NOTE
Medical Week 2 Survey    Flowsheet Row Responses   [facility] patient discharged from? [facility]   Does the patient have one of the following disease processes/diagnoses(primary or secondary)? Other   Week 2 attempt successful? Yes   Call start time 1613   Discharge diagnosis Duodenitis    Call end time 1614   Meds reviewed with patient/caregiver? Yes   Is the patient taking all medications as directed (includes completed medication regime)? Yes   Comments regarding appointments Upcoming EGD scheduled 2/24/23   Does the patient have a primary care provider?  Yes   Comments regarding PCP PCP on 2/10/23   Has the patient kept scheduled appointments due by today? Yes   What is the patient's perception of their health status since discharge? Improving   Is the patient/caregiver able to teach back the hierarchy of who to call/visit for symptoms/problems? PCP, Specialist, Home health nurse, Urgent Care, ED, 911 Yes   If the patient is a current smoker, are they able to teach back resources for cessation? Not a smoker   Week 2 Call Completed? Yes   Graduated Yes   Did the patient feel the follow up calls were helpful during their recovery period? Yes   Graduated/Revoked comments pt doing better - politely declined another FU call          Amira LOCKE - Registered Nurse

## 2023-02-24 ENCOUNTER — ON CAMPUS - OUTPATIENT (AMBULATORY)
Dept: URBAN - METROPOLITAN AREA HOSPITAL 2 | Facility: HOSPITAL | Age: 73
End: 2023-02-24

## 2023-02-24 ENCOUNTER — OFFICE (AMBULATORY)
Dept: URBAN - METROPOLITAN AREA PATHOLOGY 4 | Facility: PATHOLOGY | Age: 73
End: 2023-02-24
Payer: COMMERCIAL

## 2023-02-24 ENCOUNTER — OFFICE (AMBULATORY)
Dept: URBAN - METROPOLITAN AREA PATHOLOGY 4 | Facility: PATHOLOGY | Age: 73
End: 2023-02-24

## 2023-02-24 VITALS
DIASTOLIC BLOOD PRESSURE: 68 MMHG | SYSTOLIC BLOOD PRESSURE: 125 MMHG | SYSTOLIC BLOOD PRESSURE: 138 MMHG | SYSTOLIC BLOOD PRESSURE: 118 MMHG | OXYGEN SATURATION: 96 % | HEART RATE: 77 BPM | HEART RATE: 62 BPM | SYSTOLIC BLOOD PRESSURE: 120 MMHG | RESPIRATION RATE: 16 BRPM | HEART RATE: 72 BPM | DIASTOLIC BLOOD PRESSURE: 77 MMHG | HEART RATE: 76 BPM | DIASTOLIC BLOOD PRESSURE: 73 MMHG | HEART RATE: 75 BPM | HEIGHT: 64 IN | SYSTOLIC BLOOD PRESSURE: 156 MMHG | RESPIRATION RATE: 18 BRPM | OXYGEN SATURATION: 97 % | DIASTOLIC BLOOD PRESSURE: 75 MMHG | OXYGEN SATURATION: 100 % | OXYGEN SATURATION: 99 % | WEIGHT: 191 LBS

## 2023-02-24 DIAGNOSIS — R93.3 ABNORMAL FINDINGS ON DIAGNOSTIC IMAGING OF OTHER PARTS OF DI: ICD-10-CM

## 2023-02-24 DIAGNOSIS — K31.89 OTHER DISEASES OF STOMACH AND DUODENUM: ICD-10-CM

## 2023-02-24 DIAGNOSIS — R10.13 EPIGASTRIC PAIN: ICD-10-CM

## 2023-02-24 LAB
GI HISTOLOGY: A. SELECT: (no result)
GI HISTOLOGY: PDF REPORT: (no result)

## 2023-02-24 PROCEDURE — 88305 TISSUE EXAM BY PATHOLOGIST: CPT | Mod: 26 | Performed by: INTERNAL MEDICINE

## 2023-02-24 PROCEDURE — 43239 EGD BIOPSY SINGLE/MULTIPLE: CPT | Performed by: INTERNAL MEDICINE

## 2023-03-22 ENCOUNTER — OFFICE VISIT (OUTPATIENT)
Dept: PODIATRY | Facility: CLINIC | Age: 73
End: 2023-03-22
Payer: MEDICARE

## 2023-03-22 VITALS — WEIGHT: 192 LBS | RESPIRATION RATE: 20 BRPM | HEIGHT: 63 IN | BODY MASS INDEX: 34.02 KG/M2

## 2023-03-22 DIAGNOSIS — E11.65 TYPE 2 DIABETES MELLITUS WITH HYPERGLYCEMIA, WITHOUT LONG-TERM CURRENT USE OF INSULIN: Primary | ICD-10-CM

## 2023-03-22 DIAGNOSIS — L60.3 ONYCHODYSTROPHY: ICD-10-CM

## 2023-03-22 PROCEDURE — 1159F MED LIST DOCD IN RCRD: CPT | Performed by: PODIATRIST

## 2023-03-22 PROCEDURE — 1160F RVW MEDS BY RX/DR IN RCRD: CPT | Performed by: PODIATRIST

## 2023-03-22 PROCEDURE — 99213 OFFICE O/P EST LOW 20 MIN: CPT | Performed by: PODIATRIST

## 2023-03-22 NOTE — PROGRESS NOTES
03/22/2023  Foot and Ankle Surgery - Established Patient/Follow-up  Provider: Dr. Paolo Juan DPM  Location: [facility]    Subjective:  Katty Castellano is a 73 y.o. female.     Chief Complaint   Patient presents with   • Left Foot - Pain, Diabetes, Fracture     Dm foot care   • Right Foot - Follow-up, Diabetes     Dm foot care   • Follow-up     CLEMENCIA Brenner md 3/2023  date unknown       HPI: KATTY CASTELLANO is a 73-year-old female who presents to the office today for a follow-up regarding her bilateral feet.    She notes she enjoys her inserts. The patient is doing well overall, and notes improvement in her symptoms. She denies any open wounds, infections, or pain. She reports her last A1c was 8.2 percent, which was slightly higher than her previous A1c. The patient trims her toenails but has difficulty with it due to her flexibility. She has been moisturizing her feet.  Patient has not noticed any open wounds or signs of infection.  No other concerns involving her feet at this time.     Allergies   Allergen Reactions   • Amoxicillin-Pot Clavulanate Diarrhea   • Ciprofloxacin Myalgia   • Amoxicillin Diarrhea   • Clavulanic Acid Diarrhea   • Flagyl [Metronidazole] Myalgia       Current Outpatient Medications on File Prior to Visit   Medication Sig Dispense Refill   • Cholecalciferol (Vitamin D3) 50 MCG (2000 UT) capsule TAKE ONE CAPSULE BY MOUTH DAILY 90 capsule 6   • DULoxetine (CYMBALTA) 60 MG capsule Take 1 capsule by mouth Daily.     • latanoprost (XALATAN) 0.005 % ophthalmic solution Administer 1 drop to both eyes Every Night.     • levothyroxine (SYNTHROID, LEVOTHROID) 100 MCG tablet TAKE 1 TABLET DAILY 90 tablet 3   • metoprolol succinate XL (TOPROL-XL) 25 MG 24 hr tablet Take 1 tablet by mouth Daily. 30 tablet 2   • pantoprazole (Protonix) 40 MG EC tablet Take 1 tablet by mouth 2 (Two) Times a Day. 60 tablet 2   • pravastatin (PRAVACHOL) 40 MG tablet Take 1 tablet by mouth Every Night.     •  "Semaglutide,0.25 or 0.5MG/DOS, (Ozempic, 0.25 or 0.5 MG/DOSE,) 2 MG/1.5ML solution pen-injector Inject 0.5 mg under the skin into the appropriate area as directed 1 (One) Time Per Week.       Current Facility-Administered Medications on File Prior to Visit   Medication Dose Route Frequency Provider Last Rate Last Admin   • ondansetron ODT (ZOFRAN-ODT) disintegrating tablet 8 mg  8 mg Oral Q6H PRN Eligio Abraham MD           Objective   Resp 20   Ht 160 cm (63\")   Wt 87.1 kg (192 lb)   BMI 34.01 kg/m²     Foot/Ankle Exam:       General:   Appearance: appears stated age and healthy    Orientation: AAOx3    Affect: appropriate      VASCULAR      Right Foot Vascularity   Normal vascular exam    Dorsalis pedis:  2+  Posterior tibial:  2+  Skin Temperature: warm    Edema Grading:  None  CFT:  < 3 seconds  Pedal Hair Growth:  Present  Varicosities: none       Left Foot Vascularity   Normal vascular exam    Dorsalis pedis:  2+  Posterior tibial:  2+  Skin Temperature: warm    Edema Grading:  None  CFT:  < 3 seconds  Pedal Hair Growth:  Present  Varicosities: none        NEUROLOGIC     Right Foot Neurologic   Light touch sensation:  Normal  Hot/Cold sensation: normal    Achilles reflex:  2+     Left Foot Neurologic   Light touch sensation:  Normal  Hot/cold sensation: normal    Achilles reflex:  2+     MUSCULOSKELETAL      Right Foot Musculoskeletal   Ecchymosis:  None  Arch:  Normal     Left Foot Musculoskeletal   Ecchymosis:  None  Arch:  Normal     MUSCLE STRENGTH     Right Foot Muscle Strength   Normal strength    Foot dorsiflexion:  5  Foot plantar flexion:  5  Foot inversion:  5  Foot eversion:  5     Left Foot Muscle Strength   Normal strength    Foot dorsiflexion:  5  Foot plantar flexion:  5  Foot inversion:  5  Foot eversion:  5     DERMATOLOGIC     Right Foot Dermatologic   Skin: skin intact    Nails: abnormally thick and dystrophic nails    Nails comment:  Nails 1-5     Left Foot Dermatologic "   Skin: skin intact    Nails: abnormally thick and dystrophic nails    Nails comment:  Nails 1-5     TESTS     Right Foot Tests   Anterior drawer: negative    Varus tilt: negative       Left Foot Tests   Anterior drawer: negative    Varus tilt: negative        Left Foot Additional Comments: 10/12/2022 Mild discomfort with palpation involving the left great toe as well as the left forefoot. No gross deformity, swelling, or signs of inflammation. Mild soft tissue rigidity and equinus contracture with knee extended and flexed.    11/22/2022  minimal discomfort with palpation to the interphalangeal joint region of the left great toe. No progressive deformity or instability.    No open wounds or signs of infection. Nails are mildly thickened and dystrophic x10.      Assessment & Plan   Diagnoses and all orders for this visit:    1. Type 2 diabetes mellitus with hyperglycemia, without long-term current use of insulin (HCC) (Primary)    2. Onychodystrophy      Patient returns for foot check.  She has been wearing the inserts and has no significant improvement to her foot pain.  Overall, she is doing quite well.  She denies any other issues or concerns.  We did discuss the options of routine nail care here in office but she would like to proceed with trying to cut her own nails to decrease office appointments.  I have explained that she needs to monitor her feet closely and call with any progressive issues or concerns.  I do feel that she needs to decrease her blood sugars to have left diabetic foot risk.  Patient is to monitor her feet closely and has opted to see me on an annual basis.  Greater than 20 minutes spent before, during, and after evaluation for patient care.    No orders of the defined types were placed in this encounter.         Note is dictated utilizing voice recognition software. Unfortunately this leads to occasional typographical errors. I apologize in advance if the situation occurs. If questions occur  please do not hesitate to call our office.    Transcribed from ambient dictation for GIOVANNA Juan DPM by Damari Pelayo.  03/22/23   14:00 EDT    Patient or patient representative verbalized consent to the visit recording.  I have personally performed the services described in this document as transcribed by the above individual, and it is both accurate and complete.

## 2023-04-06 ENCOUNTER — TREATMENT (OUTPATIENT)
Dept: PHYSICAL THERAPY | Facility: CLINIC | Age: 73
End: 2023-04-06
Payer: MEDICARE

## 2023-04-06 DIAGNOSIS — M54.16 RADICULOPATHY, LUMBAR REGION: Primary | ICD-10-CM

## 2023-04-06 PROCEDURE — 97162 PT EVAL MOD COMPLEX 30 MIN: CPT | Performed by: PHYSICAL THERAPIST

## 2023-04-06 PROCEDURE — 97140 MANUAL THERAPY 1/> REGIONS: CPT | Performed by: PHYSICAL THERAPIST

## 2023-04-06 NOTE — PROGRESS NOTES
Physical Therapy Initial Evaluation and Plan of Care    Patient: Katty Ramirez   : 1950  Diagnosis/ICD-10 Code:  Radiculopathy, lumbar region [M54.16]  Referring practitioner: Jose Brenner MD  Date of initial visit : 2023  Today's Date: 2023  Patient seen for 1  session    Visit Diagnoses:    ICD-10-CM ICD-9-CM   1. Radiculopathy, lumbar region  M54.16 724.4        Subjective Evaluation    History of Present Illness  Mechanism of injury: Patient is a 73 year old female who presents a diagnosis of lumbar radiculopathy.  She reports increased left leg pain with walking most recently in 2023.  Reports no change to her daily activity or specific incident that provoked her symptoms.  Reports pain with walking and worse with greater distance.  Symptoms better now with no left leg pain just central in her lower back.  Felt better with injection in her back for a few days and had 2nd injection as well with some pain relief as well.  Has had prior episode 10-12 years ago with similar symptoms and pain better with treatment and time.  Goals for pain relief and improved ability for walking and increased activity.     HR: 61  O2 saturation: 98 %  BP: 124/84 mmHG    Subjective comment: Revised Oswestry -38%  Patient Occupation: Retired Quality of life: good    Pain  Current pain rating: 3  At best pain rating: 3  At worst pain ratin  Pain location: Lower back central - intermittent L LE.  Quality: dull ache  Relieving factors: medications, ice and rest  Aggravating factors: ambulation  Progression: improved    Social Support  Lives in: one-story house  Lives with: alone    Diagnostic Tests  No diagnostic tests performed    Treatments  Previous treatment: medication  Current treatment: physical therapy  Patient Goals  Patient goals for therapy: decreased pain, increased motion, independence with ADLs/IADLs and return to sport/leisure activities             Objective          Active Range of Motion    Left Hip   Flexion: 45 degrees   External rotation (90/90): 25 degrees   Internal rotation (90/90): 0 degrees     Right Hip   Flexion: 60 degrees   External rotation (90/90): 30 degrees   Internal rotation (90/90): 10 degrees     Additional Active Range of Motion Details  FB 75% limits with pain  BB 80% limit with pain  RR 50% limits pain free  LR 75% limits with pain  RSB 50% limits w/o pain  LSB 75% limits with pain    Strength/Myotome Testing     Left Hip   Planes of Motion   Flexion: 4+  Abduction: 4    Right Hip   Planes of Motion   Flexion: 5  Abduction: 4+    Left Knee   Flexion: 4+  Extension: 4+    Right Knee   Flexion: 5  Extension: 5    Left Ankle/Foot   Dorsiflexion: 5  Inversion: 5  Eversion: 5    Right Ankle/Foot   Inversion: 5  Eversion: 5    Tests       Thoracic   Positive slump.     Additional Tests Details  Quadrant positive EXT/ROT L - worst pain  Imrproved with lumbar positional distraction in RSL     Ambulation     Comments   Decreased arm swing, trunk rotation bilaterally, slight frontal plane compensation towards L with L stance          Assessment & Plan     Assessment  Impairments: abnormal gait, abnormal or restricted ROM, activity intolerance, impaired physical strength, lacks appropriate home exercise program and pain with function  Functional Limitations: carrying objects, lifting, walking, pulling, pushing and standing  Assessment details: Presents with altered gait, limits in bilateral hip and lumbar spine ROM with greater limitations on the left, positive neural tension with slump testing (L>R), pain to palpation lower lumbar spine and L5/S1 and greater trochanter L and activity limits per the above including standing/walking/lifting and prolonged positioning.  She would benefit from skilled PT to address the above and restore to the highest level of prior function.   Prognosis: good    Goals  Plan Goals: ST. Lumbar spine and L LE pain decreased 15% or greater over 2 weeks.    2. Improved motor control with posterior pelvic tilt and hip shifting over 2 weeks.   3. Gait mechanics improved with decreased frontal plane compensation to L and improved arm swing over 2 weeks.     LT. Revised Oswestry score improved to 20% or less over 8 weeks.   2. Lower back and left LE pain decreased 50% or greater with ADL's over 8 weeks.   3. FTSST time improved to WNL for age over 8 weeks.   4. Single leg balance improved to 5 seconds or greater or 8 weeks on R/L LE w/o UE support.   5. Gait mechanics WNL over 8 weeks.     Plan  Therapy options: will be seen for skilled therapy services  Planned modality interventions: electrical stimulation/Russian stimulation, TENS, traction, thermotherapy (hydrocollator packs) and cryotherapy  Planned therapy interventions: abdominal trunk stabilization, manual therapy, neuromuscular re-education, flexibility, spinal/joint mobilization, strengthening, functional ROM exercises, gait training, stretching, therapeutic activities and joint mobilization  Frequency: 2x week  Duration in weeks: 8  Treatment plan discussed with: patient        History # of Personal Factors and/or Comorbidities: MODERATE (1-2)  Examination of Body System(s): # of elements: MODERATE (3)  Clinical Presentation: EVOLVING  Clinical Decision Making: MODERATE       Timed:         Manual Therapy:    10     mins  46182;     Therapeutic Exercise:    8     mins  22828;       Un-Timed:  Mod Eval     30     Mins  55619        Timed Treatment:   18   mins   Total Treatment:     48   mins          PT SIGNATURE: Jules Ledesma PT, DPT   [place] Lic #[id number]    DATE TREATMENT INITIATED: 2023    Medicare Initial Certification  Certification Period: 2023  I certify that the therapy services are furnished while this patient is under my care.  The services outlined above are required by this patient, and will be reviewed every 90 days.     PHYSICIAN: Jose Brenner MD      DATE:     Please sign and return  via fax to [phone].. Thank you, [facility].

## 2023-04-11 ENCOUNTER — TREATMENT (OUTPATIENT)
Dept: PHYSICAL THERAPY | Facility: CLINIC | Age: 73
End: 2023-04-11
Payer: MEDICARE

## 2023-04-11 DIAGNOSIS — M54.16 RADICULOPATHY, LUMBAR REGION: Primary | ICD-10-CM

## 2023-04-11 PROCEDURE — 97110 THERAPEUTIC EXERCISES: CPT | Performed by: PHYSICAL THERAPIST

## 2023-04-11 PROCEDURE — 97140 MANUAL THERAPY 1/> REGIONS: CPT | Performed by: PHYSICAL THERAPIST

## 2023-04-11 NOTE — PROGRESS NOTES
Physical Therapy Daily Progress Note      Patient: Katty Ramirez   : 1950  Diagnosis/ICD-10 Code:  Radiculopathy, lumbar region [M54.16]  Referring practitioner: Jose Brenner MD  Date of Initial Visit: Type: THERAPY  Noted: 2023  Today's Date: 2023  Patient seen for 2 sessions             Subjective Pt did notice some improvement after her initial visit.    Objective   See Exercise, Manual, and Modality Logs for complete treatment.       Assessment/Plan  Overall, good tolerance with progression and addition of strengthening and stretching.  Manual techniques tolerated well overall but did have an occasional c/o burning in her left arch of her foot    Progress per Plan of Care           Timed:         Manual Therapy:    10     mins  43809;     Therapeutic Exercise:    30     mins  45791;         Timed Treatment:   40   mins   Total Treatment:     40   mins        Eligio Carlton PTA  Physical Therapist Assistant

## 2023-04-14 ENCOUNTER — TREATMENT (OUTPATIENT)
Dept: PHYSICAL THERAPY | Facility: CLINIC | Age: 73
End: 2023-04-14
Payer: MEDICARE

## 2023-04-14 DIAGNOSIS — M54.16 RADICULOPATHY, LUMBAR REGION: Primary | ICD-10-CM

## 2023-04-14 PROCEDURE — 97140 MANUAL THERAPY 1/> REGIONS: CPT | Performed by: PHYSICAL THERAPIST

## 2023-04-14 PROCEDURE — 97110 THERAPEUTIC EXERCISES: CPT | Performed by: PHYSICAL THERAPIST

## 2023-04-17 ENCOUNTER — TREATMENT (OUTPATIENT)
Dept: PHYSICAL THERAPY | Facility: CLINIC | Age: 73
End: 2023-04-17
Payer: MEDICARE

## 2023-04-17 DIAGNOSIS — M54.16 RADICULOPATHY, LUMBAR REGION: Primary | ICD-10-CM

## 2023-04-17 PROCEDURE — 97140 MANUAL THERAPY 1/> REGIONS: CPT | Performed by: PHYSICAL THERAPIST

## 2023-04-17 PROCEDURE — 97110 THERAPEUTIC EXERCISES: CPT | Performed by: PHYSICAL THERAPIST

## 2023-04-17 NOTE — PROGRESS NOTES
Physical Therapy Daily Treatment Note  Visit: 4    Katty Ramirez reports: no new issues today.   YOB: 1950  Referring practitioner : Jose Brenner MD  Date of Initial: Type: THERAPY  Noted: 4/6/2023  Today's date: 4/17/2023  Patient seen for 4 sessions    Visit Diagnoses:    ICD-10-CM ICD-9-CM   1. Radiculopathy, lumbar region  M54.16 724.4       Subjective       Objective   See Exercise, Manual, and Modality Logs for complete treatment.       Assessment/Plan    L hip IR AROM improved post tx with decreased radicular symptoms post treatment.     Plan:    Continue current           Timed:         Manual Therapy:    10     mins  37660;     Therapeutic Exercise:    20     mins  62191;         Timed Treatment:   30   mins   Total Treatment:     30   mins         Jules Ledesma PT, DPT  Physical Therapist  [place] Lic #[id number]

## 2023-04-20 ENCOUNTER — TREATMENT (OUTPATIENT)
Dept: PHYSICAL THERAPY | Facility: CLINIC | Age: 73
End: 2023-04-20
Payer: MEDICARE

## 2023-04-20 DIAGNOSIS — M54.16 RADICULOPATHY, LUMBAR REGION: Primary | ICD-10-CM

## 2023-04-20 PROCEDURE — 97140 MANUAL THERAPY 1/> REGIONS: CPT | Performed by: PHYSICAL THERAPIST

## 2023-04-20 PROCEDURE — 97110 THERAPEUTIC EXERCISES: CPT | Performed by: PHYSICAL THERAPIST

## 2023-04-21 NOTE — PROGRESS NOTES
Physical Therapy Daily Treatment Note  Visit: 5    Katty Ramirez reports: feels she is improving with less pain in her back and left leg.   YOB: 1950  Referring practitioner : Jose Brenner MD  Date of Initial: Type: THERAPY  Noted: 4/6/2023  Today's date: 4/21/2023  Patient seen for 5 sessions    Visit Diagnoses:    ICD-10-CM ICD-9-CM   1. Radiculopathy, lumbar region  M54.16 724.4       Subjective       Objective   See Exercise, Manual, and Modality Logs for complete treatment.       Assessment/Plan     Radicular symptoms and activity tolerance improving.     Plan:    Continue current             Timed:         Manual Therapy:    12     mins  72931;     Therapeutic Exercise:    18     mins  76221;         Timed Treatment:   30   mins   Total Treatment:     30   mins         Jules Ledesma PT, DPT  Physical Therapist  [place] Lic #[id number]

## 2023-04-24 ENCOUNTER — TREATMENT (OUTPATIENT)
Dept: PHYSICAL THERAPY | Facility: CLINIC | Age: 73
End: 2023-04-24
Payer: MEDICARE

## 2023-04-24 DIAGNOSIS — M54.16 RADICULOPATHY, LUMBAR REGION: Primary | ICD-10-CM

## 2023-04-24 PROCEDURE — 97110 THERAPEUTIC EXERCISES: CPT | Performed by: PHYSICAL THERAPIST

## 2023-04-24 PROCEDURE — 97140 MANUAL THERAPY 1/> REGIONS: CPT | Performed by: PHYSICAL THERAPIST

## 2023-04-24 NOTE — PROGRESS NOTES
Physical Therapy Daily Treatment Note  Visit: 6    Katty Ramirez reports: did well with last visit and feels may be able to continue independently soon.   YOB: 1950  Referring practitioner : Jose Brenner MD  Date of Initial: Type: THERAPY  Noted: 4/6/2023  Today's date: 4/24/2023  Patient seen for 6 sessions    Visit Diagnoses:    ICD-10-CM ICD-9-CM   1. Radiculopathy, lumbar region  M54.16 724.4       Subjective       Objective   See Exercise, Manual, and Modality Logs for complete treatment.       Assessment/Plan     Improved radicular symptoms in L LE and lumbar spine.  Some dizziness post visit with BP WNL.  Katty reported she had not had breakfast and ate small snack before leaving clinic with no dizziness post.     Plan:    Continue current           Timed:         Manual Therapy:    12     mins  37733;     Therapeutic Exercise:    20     mins  67023;           Timed Treatment:   32   mins   Total Treatment:     32   mins         Jules Ledesma PT  Physical Therapist  [place] Lic #[id number]

## 2023-04-27 ENCOUNTER — TREATMENT (OUTPATIENT)
Dept: PHYSICAL THERAPY | Facility: CLINIC | Age: 73
End: 2023-04-27
Payer: MEDICARE

## 2023-04-27 DIAGNOSIS — M54.16 RADICULOPATHY, LUMBAR REGION: Primary | ICD-10-CM

## 2023-04-27 PROCEDURE — 97110 THERAPEUTIC EXERCISES: CPT | Performed by: PHYSICAL THERAPIST

## 2023-04-27 PROCEDURE — 97140 MANUAL THERAPY 1/> REGIONS: CPT | Performed by: PHYSICAL THERAPIST

## 2023-04-27 NOTE — PROGRESS NOTES
Physical Therapy Daily Treatment Note  Visit: 7    Katty Ramirez reports: doing well with low pain levels in her back/left leg and feels can continue independently after today.   YOB: 1950  Referring practitioner : Jose Brenner MD  Date of Initial: Type: THERAPY  Noted: 4/6/2023  Today's date: 4/27/2023  Patient seen for 7 sessions    Visit Diagnoses:    ICD-10-CM ICD-9-CM   1. Radiculopathy, lumbar region  M54.16 724.4       Subjective       Objective   See Exercise, Manual, and Modality Logs for complete treatment.       Assessment/Plan     Good improvement in hip/lumbar AROM and radicular symptoms.  Still requires some cueing for correct exercise technique but lessening.  Trial HEP with education to return if symptoms worsen or if HEP review needed.            Timed:         Manual Therapy:    10     mins  53395;     Therapeutic Exercise:    20     mins  77515;           Timed Treatment:   30   mins   Total Treatment:     30   mins         Jules Ledesma PT, DPT  Physical Therapist  [place] Lic #[id number]

## 2023-04-28 RX ORDER — SEMAGLUTIDE 0.68 MG/ML
0.5 INJECTION, SOLUTION SUBCUTANEOUS WEEKLY
Qty: 3 ML | Refills: 0 | Status: SHIPPED | OUTPATIENT
Start: 2023-04-28

## 2023-05-09 RX ORDER — SEMAGLUTIDE 0.68 MG/ML
0.5 INJECTION, SOLUTION SUBCUTANEOUS WEEKLY
Qty: 3 ML | Refills: 3 | Status: SHIPPED | OUTPATIENT
Start: 2023-05-09

## 2023-05-30 ENCOUNTER — HOSPITAL ENCOUNTER (OUTPATIENT)
Dept: CARDIOLOGY | Facility: HOSPITAL | Age: 73
Discharge: HOME | End: 2023-05-30

## 2023-05-30 ENCOUNTER — APPOINTMENT (OUTPATIENT)
Dept: VASCULAR SURGERY | Facility: HOSPITAL | Age: 73
End: 2023-05-30

## 2023-05-30 DIAGNOSIS — I73.9 PERIPHERAL VASCULAR DISEASE, UNSPECIFIED: ICD-10-CM

## 2023-05-30 LAB
BH CV LOWER ARTERIAL LEFT ABI RATIO: 0.62
BH CV LOWER ARTERIAL LEFT CALF RATIO: 0.52
BH CV LOWER ARTERIAL LEFT DORSALIS PEDIS SYS MAX: 77
BH CV LOWER ARTERIAL LEFT GREAT TOE SYS MAX: 73
BH CV LOWER ARTERIAL LEFT LOW THIGH RATIO: 0.72
BH CV LOWER ARTERIAL LEFT LOW THIGH SYS MAX: 90
BH CV LOWER ARTERIAL LEFT POPLITEAL SYS MAX: 65
BH CV LOWER ARTERIAL LEFT POST TIBIAL SYS MAX: 76
BH CV LOWER ARTERIAL LEFT TBI RATIO: 0.58
BH CV LOWER ARTERIAL RIGHT ABI RATIO: 1.02
BH CV LOWER ARTERIAL RIGHT CALF RATIO: 1.06
BH CV LOWER ARTERIAL RIGHT DORSALIS PEDIS SYS MAX: 125
BH CV LOWER ARTERIAL RIGHT GREAT TOE SYS MAX: 76
BH CV LOWER ARTERIAL RIGHT LOW THIGH RATIO: 1.25
BH CV LOWER ARTERIAL RIGHT LOW THIGH SYS MAX: 156
BH CV LOWER ARTERIAL RIGHT POPLITEAL SYS MAX: 132
BH CV LOWER ARTERIAL RIGHT POST TIBIAL SYS MAX: 127
BH CV LOWER ARTERIAL RIGHT TBI RATIO: 0.61
MAXIMAL PREDICTED HEART RATE: 146 BPM
STRESS TARGET HR: 124 BPM
UPPER ARTERIAL LEFT ARM BRACHIAL SYS MAX: 125 MMHG
UPPER ARTERIAL RIGHT ARM BRACHIAL SYS MAX: 120 MMHG

## 2023-05-30 PROCEDURE — G0463 HOSPITAL OUTPT CLINIC VISIT: HCPCS

## 2023-05-30 PROCEDURE — 93923 UPR/LXTR ART STDY 3+ LVLS: CPT

## 2023-08-31 RX ORDER — ACETAMINOPHEN 160 MG
TABLET,DISINTEGRATING ORAL
Qty: 90 CAPSULE | Refills: 3 | Status: SHIPPED | OUTPATIENT
Start: 2023-08-31

## 2023-09-20 ENCOUNTER — TRANSCRIBE ORDERS (OUTPATIENT)
Dept: ADMINISTRATIVE | Facility: HOSPITAL | Age: 73
End: 2023-09-20
Payer: MEDICARE

## 2023-09-20 DIAGNOSIS — R09.89 BRUIT: ICD-10-CM

## 2023-09-20 DIAGNOSIS — I71.43 INFRARENAL ABDOMINAL AORTIC ANEURYSM (AAA) WITHOUT RUPTURE: Primary | ICD-10-CM

## 2023-09-26 ENCOUNTER — LAB (OUTPATIENT)
Dept: LAB | Facility: HOSPITAL | Age: 73
End: 2023-09-26
Payer: MEDICARE

## 2023-09-26 ENCOUNTER — OFFICE VISIT (OUTPATIENT)
Dept: ENDOCRINOLOGY | Facility: CLINIC | Age: 73
End: 2023-09-26
Payer: MEDICARE

## 2023-09-26 VITALS
WEIGHT: 199 LBS | BODY MASS INDEX: 35.26 KG/M2 | SYSTOLIC BLOOD PRESSURE: 138 MMHG | OXYGEN SATURATION: 95 % | HEIGHT: 63 IN | DIASTOLIC BLOOD PRESSURE: 68 MMHG | HEART RATE: 56 BPM

## 2023-09-26 DIAGNOSIS — E11.42 DIABETIC PERIPHERAL NEUROPATHY: ICD-10-CM

## 2023-09-26 DIAGNOSIS — E03.9 ACQUIRED HYPOTHYROIDISM: ICD-10-CM

## 2023-09-26 DIAGNOSIS — E78.2 MIXED HYPERLIPIDEMIA: ICD-10-CM

## 2023-09-26 DIAGNOSIS — I10 ESSENTIAL HYPERTENSION: ICD-10-CM

## 2023-09-26 DIAGNOSIS — E11.9 TYPE 2 DIABETES MELLITUS WITHOUT COMPLICATION, WITHOUT LONG-TERM CURRENT USE OF INSULIN: Primary | ICD-10-CM

## 2023-09-26 DIAGNOSIS — E11.9 TYPE 2 DIABETES MELLITUS WITHOUT COMPLICATION, WITHOUT LONG-TERM CURRENT USE OF INSULIN: ICD-10-CM

## 2023-09-26 LAB
ALBUMIN SERPL-MCNC: 4.2 G/DL (ref 3.5–5.2)
ALBUMIN UR-MCNC: 3.3 MG/DL
ALBUMIN/GLOB SERPL: 1.8 G/DL
ALP SERPL-CCNC: 75 U/L (ref 39–117)
ALT SERPL W P-5'-P-CCNC: 18 U/L (ref 1–33)
ANION GAP SERPL CALCULATED.3IONS-SCNC: 11.4 MMOL/L (ref 5–15)
AST SERPL-CCNC: 21 U/L (ref 1–32)
BILIRUB SERPL-MCNC: 0.4 MG/DL (ref 0–1.2)
BUN SERPL-MCNC: 18 MG/DL (ref 8–23)
BUN/CREAT SERPL: 25.7 (ref 7–25)
CALCIUM SPEC-SCNC: 10 MG/DL (ref 8.6–10.5)
CHLORIDE SERPL-SCNC: 104 MMOL/L (ref 98–107)
CHOLEST SERPL-MCNC: 240 MG/DL (ref 0–200)
CO2 SERPL-SCNC: 26.6 MMOL/L (ref 22–29)
CREAT SERPL-MCNC: 0.7 MG/DL (ref 0.57–1)
CREAT UR-MCNC: 161.3 MG/DL
EGFRCR SERPLBLD CKD-EPI 2021: 90.9 ML/MIN/1.73
GLOBULIN UR ELPH-MCNC: 2.4 GM/DL
GLUCOSE BLDC GLUCOMTR-MCNC: 116 MG/DL (ref 70–105)
GLUCOSE SERPL-MCNC: 118 MG/DL (ref 65–99)
HBA1C MFR BLD: 7.4 % (ref 4.8–5.6)
HDLC SERPL-MCNC: 41 MG/DL (ref 40–60)
LDLC SERPL CALC-MCNC: 123 MG/DL (ref 0–100)
LDLC/HDLC SERPL: 2.75 {RATIO}
MICROALBUMIN/CREAT UR: 20.5 MG/G
POTASSIUM SERPL-SCNC: 4.3 MMOL/L (ref 3.5–5.2)
PROT SERPL-MCNC: 6.6 G/DL (ref 6–8.5)
SODIUM SERPL-SCNC: 142 MMOL/L (ref 136–145)
T4 FREE SERPL-MCNC: 1.44 NG/DL (ref 0.93–1.7)
TRIGL SERPL-MCNC: 431 MG/DL (ref 0–150)
TSH SERPL DL<=0.05 MIU/L-ACNC: 1.22 UIU/ML (ref 0.27–4.2)
VLDLC SERPL-MCNC: 76 MG/DL (ref 5–40)

## 2023-09-26 PROCEDURE — 1159F MED LIST DOCD IN RCRD: CPT | Performed by: INTERNAL MEDICINE

## 2023-09-26 PROCEDURE — 84439 ASSAY OF FREE THYROXINE: CPT

## 2023-09-26 PROCEDURE — 99214 OFFICE O/P EST MOD 30 MIN: CPT | Performed by: INTERNAL MEDICINE

## 2023-09-26 PROCEDURE — 80061 LIPID PANEL: CPT

## 2023-09-26 PROCEDURE — 3075F SYST BP GE 130 - 139MM HG: CPT | Performed by: INTERNAL MEDICINE

## 2023-09-26 PROCEDURE — 83036 HEMOGLOBIN GLYCOSYLATED A1C: CPT

## 2023-09-26 PROCEDURE — 82043 UR ALBUMIN QUANTITATIVE: CPT

## 2023-09-26 PROCEDURE — 3078F DIAST BP <80 MM HG: CPT | Performed by: INTERNAL MEDICINE

## 2023-09-26 PROCEDURE — 36415 COLL VENOUS BLD VENIPUNCTURE: CPT

## 2023-09-26 PROCEDURE — 80053 COMPREHEN METABOLIC PANEL: CPT

## 2023-09-26 PROCEDURE — 84443 ASSAY THYROID STIM HORMONE: CPT

## 2023-09-26 PROCEDURE — 82948 REAGENT STRIP/BLOOD GLUCOSE: CPT | Performed by: INTERNAL MEDICINE

## 2023-09-26 PROCEDURE — 82570 ASSAY OF URINE CREATININE: CPT

## 2023-09-26 PROCEDURE — 1160F RVW MEDS BY RX/DR IN RCRD: CPT | Performed by: INTERNAL MEDICINE

## 2023-09-26 RX ORDER — CLOPIDOGREL BISULFATE 75 MG/1
75 TABLET ORAL DAILY
COMMUNITY

## 2023-09-26 NOTE — PATIENT INSTRUCTIONS
Current management  Labs today  Always keep glucose source in case of low blood sugar  Continue to work on your diet and activity.

## 2023-09-26 NOTE — PROGRESS NOTES
Endocrine Progress Note Outpatient     Patient Care Team:  Jose Brenner MD as PCP - General  Jose Brenner MD as PCP - Family Medicine  Eligio Abraham MD as Consulting Physician (Cardiology)  Preeti Herrera MD as Consulting Physician (Endocrinology)      Chief Complaint: Follow-up type 2 diabetes    HPI: 72-year-old female with history of type 2 diabetes, hypertension, hyperlipidemia and hypothyroidism is here for follow-up.    For type 2 diabetes: S she was on Ozempic but she stopped it because of the cost and she has not taken any diabetes medication at this time.  She did bring in blood sugar records for review, most of them are running below 140.  She has some occasional high numbers especially in the evening time.  She is trying to follow her diet the best she can.    Hypertension: Well-controlled    Hypothyroidism: On levothyroxine supplementation    Hyperlipidemia: She is currently on pravastatin.    She is status post stent placement in the left lower extremity on September 25, 2023.    Past Medical History:   Diagnosis Date    Disease of thyroid gland     Diverticulitis     Exertional dyspnea 8/6/2021    Heart murmur     Hyperlipidemia     Hypertension     Type 2 diabetes mellitus     Uncontrolled type 2 diabetes mellitus with hyperglycemia 7/30/2019    Unstable angina 8/6/2021       Social History     Socioeconomic History    Marital status:     Number of children: 3    Years of education: 12   Tobacco Use    Smoking status: Former    Smokeless tobacco: Never   Vaping Use    Vaping Use: Never used   Substance and Sexual Activity    Alcohol use: Yes     Comment: occasonally / social    Drug use: Defer    Sexual activity: Defer       Family History   Problem Relation Age of Onset    Stroke Mother     Cancer Father         prostate    Heart disease Brother     Cancer Brother         pranciritic    Cancer Maternal Grandmother         colon    Heart disease Paternal Grandmother         Allergies   Allergen Reactions    Amoxicillin-Pot Clavulanate Diarrhea    Ciprofloxacin Myalgia    Amoxicillin Diarrhea    Clavulanic Acid Diarrhea    Flagyl [Metronidazole] Myalgia       ROS:   Constitutional:  Admit fatigue, tiredness.    Eyes:  Denies change in visual acuity   HENT:  Denies nasal congestion or sore throat   Respiratory: denies cough, admit shortness of breath.   Cardiovascular:  denies chest pain, edema   GI:  Denies abdominal pain, nausea, vomiting.   Musculoskeletal:  Denies back pain or joint pain   Integument:  Denies dry skin and rash   Neurologic:  Denies headache, focal weakness or sensory changes   Endocrine:  Denies polyuria or polydipsia   Psychiatric:  Denies depression or anxiety      Vitals:    09/26/23 0922   BP: 138/68   Pulse: 56   SpO2: 95%     BMI: 32.4    Physical Exam:  GEN: NAD, conversant, obese  CHEST: CTA  CVS: RRR  PSYCH: AOX3, appropriate mood, affect normal      Results Review:     I reviewed the patient's new clinical results.    Lab Results   Component Value Date    HGBA1C 6.5 (H) 10/18/2022    HGBA1C 5.8 (H) 03/03/2022    HGBA1C 5.9 (H) 08/30/2021      Lab Results   Component Value Date    GLUCOSE 129 (H) 01/31/2023    BUN 9 01/31/2023    CREATININE 0.96 01/31/2023    EGFRIFNONA 47 (L) 02/22/2022    BCR 9.4 01/31/2023    K 3.7 01/31/2023    CO2 28.0 01/31/2023    CALCIUM 9.6 01/31/2023    ALBUMIN 3.6 01/29/2023    LABIL2 2.1 (H) 06/01/2019    AST 13 01/29/2023    ALT 13 01/29/2023    CHOL 126 10/18/2022    TRIG 124 10/18/2022    LDL 46 10/18/2022    HDL 58 10/18/2022     Lab Results   Component Value Date    TSH 3.030 10/18/2022    FREET4 1.55 10/18/2022         Medication Review: Reviewed.       Current Outpatient Medications:     Cholecalciferol (Vitamin D3) 50 MCG (2000 UT) capsule, TAKE ONE CAPSULE BY MOUTH DAILY, Disp: 90 capsule, Rfl: 3    clopidogrel (PLAVIX) 75 MG tablet, Take 1 tablet by mouth Daily., Disp: , Rfl:     DULoxetine (CYMBALTA) 60 MG  capsule, Take 1 capsule by mouth Daily., Disp: , Rfl:     latanoprost (XALATAN) 0.005 % ophthalmic solution, Administer 1 drop to both eyes Every Night., Disp: , Rfl:     levothyroxine (SYNTHROID, LEVOTHROID) 100 MCG tablet, TAKE 1 TABLET DAILY, Disp: 90 tablet, Rfl: 2    metoprolol succinate XL (TOPROL-XL) 25 MG 24 hr tablet, Take 1 tablet by mouth Daily. (Patient taking differently: Take 2 tablets by mouth Daily.), Disp: 30 tablet, Rfl: 2    pravastatin (PRAVACHOL) 40 MG tablet, Take 1 tablet by mouth Every Night., Disp: , Rfl:     Current Facility-Administered Medications:     ondansetron ODT (ZOFRAN-ODT) disintegrating tablet 8 mg, 8 mg, Oral, Q6H PRN, Eligio Abraham MD      Assessment & Plan   1.  Diabetes mellitus type 2 with hyperglycemia: Uncontrolled, no recent labs, will check A1c.  She is not on any diabetes medications at this time.  We will follow blood sugars and A1c and then make further recommendations.    2.  Hypertension: Well-controlled, continue current medications.    3.  Hyperlipidemia: Well-controlled, will continue pravastatin.     4.  Hypothyroidism: On levothyroxine at 100 mcg p.o. daily.  We will follow TSH and free T4.    5.  Diabetic peripheral neuropathy: She is on vitamin D supplementation.    Assessment and plan from October 25, 2022 reviewed and updated.                Preeti Herrera MD FACE.

## 2023-09-28 NOTE — PROGRESS NOTES
Triglycerides high at 431, she is on pravastatin.  I would like to DC pravastatin and add Crestor 10 mg p.o. daily and check lipid panel and A1c and CMP before next follow-up.

## 2023-10-04 DIAGNOSIS — E78.2 MIXED HYPERLIPIDEMIA: ICD-10-CM

## 2023-10-04 DIAGNOSIS — E11.9 TYPE 2 DIABETES MELLITUS WITHOUT COMPLICATION, WITHOUT LONG-TERM CURRENT USE OF INSULIN: Primary | ICD-10-CM

## 2023-10-04 DIAGNOSIS — I10 ESSENTIAL HYPERTENSION: ICD-10-CM

## 2023-10-04 DIAGNOSIS — E03.9 ACQUIRED HYPOTHYROIDISM: ICD-10-CM

## 2023-10-05 RX ORDER — ROSUVASTATIN CALCIUM 10 MG/1
10 TABLET, COATED ORAL DAILY
Qty: 90 TABLET | Refills: 2 | Status: SHIPPED | OUTPATIENT
Start: 2023-10-05

## 2023-12-02 ENCOUNTER — APPOINTMENT (OUTPATIENT)
Dept: GENERAL RADIOLOGY | Facility: HOSPITAL | Age: 73
End: 2023-12-02
Payer: MEDICARE

## 2023-12-02 LAB
ALBUMIN SERPL-MCNC: 4.2 G/DL (ref 3.5–5.2)
ALBUMIN/GLOB SERPL: 1.4 G/DL
ALP SERPL-CCNC: 113 U/L (ref 39–117)
ALT SERPL W P-5'-P-CCNC: 25 U/L (ref 1–33)
ANION GAP SERPL CALCULATED.3IONS-SCNC: 14 MMOL/L (ref 5–15)
AST SERPL-CCNC: 24 U/L (ref 1–32)
BASOPHILS # BLD AUTO: 0.1 10*3/MM3 (ref 0–0.2)
BASOPHILS NFR BLD AUTO: 0.8 % (ref 0–1.5)
BILIRUB SERPL-MCNC: 0.3 MG/DL (ref 0–1.2)
BUN SERPL-MCNC: 34 MG/DL (ref 8–23)
BUN/CREAT SERPL: 26.8 (ref 7–25)
CALCIUM SPEC-SCNC: 10.4 MG/DL (ref 8.6–10.5)
CHLORIDE SERPL-SCNC: 101 MMOL/L (ref 98–107)
CO2 SERPL-SCNC: 26 MMOL/L (ref 22–29)
CREAT SERPL-MCNC: 1.27 MG/DL (ref 0.57–1)
DEPRECATED RDW RBC AUTO: 47.7 FL (ref 37–54)
EGFRCR SERPLBLD CKD-EPI 2021: 44.5 ML/MIN/1.73
EOSINOPHIL # BLD AUTO: 0.2 10*3/MM3 (ref 0–0.4)
EOSINOPHIL NFR BLD AUTO: 2.5 % (ref 0.3–6.2)
ERYTHROCYTE [DISTWIDTH] IN BLOOD BY AUTOMATED COUNT: 13.8 % (ref 12.3–15.4)
GLOBULIN UR ELPH-MCNC: 3 GM/DL
GLUCOSE SERPL-MCNC: 292 MG/DL (ref 65–99)
HCT VFR BLD AUTO: 38.8 % (ref 34–46.6)
HGB BLD-MCNC: 12.9 G/DL (ref 12–15.9)
LYMPHOCYTES # BLD AUTO: 1.9 10*3/MM3 (ref 0.7–3.1)
LYMPHOCYTES NFR BLD AUTO: 29.2 % (ref 19.6–45.3)
MCH RBC QN AUTO: 31 PG (ref 26.6–33)
MCHC RBC AUTO-ENTMCNC: 33.2 G/DL (ref 31.5–35.7)
MCV RBC AUTO: 93.4 FL (ref 79–97)
MONOCYTES # BLD AUTO: 0.6 10*3/MM3 (ref 0.1–0.9)
MONOCYTES NFR BLD AUTO: 9.9 % (ref 5–12)
NEUTROPHILS NFR BLD AUTO: 3.8 10*3/MM3 (ref 1.7–7)
NEUTROPHILS NFR BLD AUTO: 57.6 % (ref 42.7–76)
NRBC BLD AUTO-RTO: 0.1 /100 WBC (ref 0–0.2)
NT-PROBNP SERPL-MCNC: <36 PG/ML (ref 0–900)
PLATELET # BLD AUTO: 271 10*3/MM3 (ref 140–450)
PMV BLD AUTO: 8.4 FL (ref 6–12)
POTASSIUM SERPL-SCNC: 4.4 MMOL/L (ref 3.5–5.2)
PROT SERPL-MCNC: 7.2 G/DL (ref 6–8.5)
RBC # BLD AUTO: 4.16 10*6/MM3 (ref 3.77–5.28)
SODIUM SERPL-SCNC: 141 MMOL/L (ref 136–145)
TROPONIN T SERPL HS-MCNC: 8 NG/L
WBC NRBC COR # BLD AUTO: 6.5 10*3/MM3 (ref 3.4–10.8)

## 2023-12-02 PROCEDURE — 99284 EMERGENCY DEPT VISIT MOD MDM: CPT

## 2023-12-02 PROCEDURE — 93005 ELECTROCARDIOGRAM TRACING: CPT

## 2023-12-02 PROCEDURE — 83880 ASSAY OF NATRIURETIC PEPTIDE: CPT | Performed by: NURSE PRACTITIONER

## 2023-12-02 PROCEDURE — 71045 X-RAY EXAM CHEST 1 VIEW: CPT

## 2023-12-02 PROCEDURE — 84484 ASSAY OF TROPONIN QUANT: CPT

## 2023-12-02 PROCEDURE — 85025 COMPLETE CBC W/AUTO DIFF WBC: CPT | Performed by: NURSE PRACTITIONER

## 2023-12-02 PROCEDURE — 93005 ELECTROCARDIOGRAM TRACING: CPT | Performed by: EMERGENCY MEDICINE

## 2023-12-02 PROCEDURE — 80053 COMPREHEN METABOLIC PANEL: CPT | Performed by: NURSE PRACTITIONER

## 2023-12-02 RX ORDER — SODIUM CHLORIDE 0.9 % (FLUSH) 0.9 %
10 SYRINGE (ML) INJECTION AS NEEDED
Status: DISCONTINUED | OUTPATIENT
Start: 2023-12-02 | End: 2023-12-03 | Stop reason: HOSPADM

## 2023-12-03 ENCOUNTER — HOSPITAL ENCOUNTER (EMERGENCY)
Facility: HOSPITAL | Age: 73
Discharge: HOME | End: 2023-12-03
Attending: EMERGENCY MEDICINE | Admitting: EMERGENCY MEDICINE
Payer: MEDICARE

## 2023-12-03 ENCOUNTER — APPOINTMENT (OUTPATIENT)
Dept: CT IMAGING | Facility: HOSPITAL | Age: 73
End: 2023-12-03
Payer: MEDICARE

## 2023-12-03 VITALS
OXYGEN SATURATION: 97 % | TEMPERATURE: 98.1 F | HEART RATE: 67 BPM | SYSTOLIC BLOOD PRESSURE: 133 MMHG | RESPIRATION RATE: 18 BRPM | HEIGHT: 63 IN | WEIGHT: 203.04 LBS | BODY MASS INDEX: 35.98 KG/M2 | DIASTOLIC BLOOD PRESSURE: 74 MMHG

## 2023-12-03 DIAGNOSIS — I15.9 SECONDARY HYPERTENSION: ICD-10-CM

## 2023-12-03 DIAGNOSIS — R51.9 ACUTE NONINTRACTABLE HEADACHE, UNSPECIFIED HEADACHE TYPE: Primary | ICD-10-CM

## 2023-12-03 DIAGNOSIS — N28.9 RENAL INSUFFICIENCY, MILD: ICD-10-CM

## 2023-12-03 DIAGNOSIS — R07.89 CHEST TIGHTNESS: ICD-10-CM

## 2023-12-03 LAB
GEN 5 2HR TROPONIN T REFLEX: 10 NG/L
QT INTERVAL: 373 MS
QTC INTERVAL: 413 MS
TROPONIN T DELTA: 2 NG/L

## 2023-12-03 PROCEDURE — 70450 CT HEAD/BRAIN W/O DYE: CPT

## 2023-12-03 PROCEDURE — 84484 ASSAY OF TROPONIN QUANT: CPT

## 2023-12-03 PROCEDURE — 36415 COLL VENOUS BLD VENIPUNCTURE: CPT

## 2023-12-03 PROCEDURE — 63710000001 ONDANSETRON PER 8 MG: Performed by: NURSE PRACTITIONER

## 2023-12-03 RX ORDER — HYDROCODONE BITARTRATE AND ACETAMINOPHEN 5; 325 MG/1; MG/1
1 TABLET ORAL ONCE
Status: COMPLETED | OUTPATIENT
Start: 2023-12-03 | End: 2023-12-03

## 2023-12-03 RX ORDER — ONDANSETRON 4 MG/1
4 TABLET, FILM COATED ORAL ONCE
Status: COMPLETED | OUTPATIENT
Start: 2023-12-03 | End: 2023-12-03

## 2023-12-03 RX ADMIN — ONDANSETRON HYDROCHLORIDE 4 MG: 4 TABLET, FILM COATED ORAL at 02:40

## 2023-12-03 RX ADMIN — HYDROCODONE BITARTRATE AND ACETAMINOPHEN 1 TABLET: 5; 325 TABLET ORAL at 02:40

## 2023-12-03 NOTE — ED PROVIDER NOTES
Subjective     Provider in Triage Note  Patient is a 73-year-old female who comes in with  upper back pain that started hurting she reports she was coming to the hospital because her blood pressure was over 200 at home patient also reports headache but she states she feels like it is just being nervous.  He has no nausea or vomiting-no fever    Patient reports she tested positive for COVID on October 19.    Due to significant overcrowding in the emergency department patient was initially seen and evaluated in triage.  Provider in triage recommended patient placement in the treatment area to initiate therapy and movement to an ER bed as soon as possible.    History of Present Illness  I provided the provider in triage note and attest this is the HPI      Review of Systems   Respiratory:  Positive for chest tightness.    Neurological:  Positive for headaches.       Past Medical History:   Diagnosis Date    Disease of thyroid gland     Diverticulitis     Exertional dyspnea 8/6/2021    Heart murmur     Hyperlipidemia     Hypertension     Type 2 diabetes mellitus     Uncontrolled type 2 diabetes mellitus with hyperglycemia 7/30/2019    Unstable angina 8/6/2021       Allergies   Allergen Reactions    Amoxicillin-Pot Clavulanate Diarrhea    Ciprofloxacin Myalgia    Amoxicillin Diarrhea    Clavulanic Acid Diarrhea    Flagyl [Metronidazole] Myalgia       Past Surgical History:   Procedure Laterality Date    CARDIAC CATHETERIZATION N/A 8/12/2021    Procedure: Left Heart Cath;  Surgeon: Eligio Abraham MD;  Location: [facility] CATH INVASIVE LOCATION;  Service: Cardiology;  Laterality: N/A;    CATARACT EXTRACTION  01/01/2014    COLON RESECTION  01/01/2016    HERNIA REPAIR  01/01/2016       Family History   Problem Relation Age of Onset    Stroke Mother     Cancer Father         prostate    Heart disease Brother     Cancer Brother         pranciritic    Cancer Maternal Grandmother         colon    Heart disease Paternal  Grandmother        Social History     Socioeconomic History    Marital status:     Number of children: 3    Years of education: 12   Tobacco Use    Smoking status: Former    Smokeless tobacco: Never   Vaping Use    Vaping Use: Never used   Substance and Sexual Activity    Alcohol use: Yes     Comment: occasonally / social    Drug use: Defer    Sexual activity: Defer           Objective   Physical Exam  Vitals reviewed.   Constitutional:       Appearance: She is well-developed. She is obese.   HENT:      Head: Normocephalic and atraumatic.   Eyes:      Conjunctiva/sclera: Conjunctivae normal.      Pupils: Pupils are equal, round, and reactive to light.   Cardiovascular:      Rate and Rhythm: Normal rate and regular rhythm.      Pulses:           Carotid pulses are 1+ on the right side and 1+ on the left side.       Radial pulses are 1+ on the right side and 1+ on the left side.        Dorsalis pedis pulses are 1+ on the right side and 1+ on the left side.        Posterior tibial pulses are 1+ on the right side and 1+ on the left side.      Heart sounds: Normal heart sounds.   Pulmonary:      Effort: Pulmonary effort is normal. No respiratory distress.      Breath sounds: Normal breath sounds. No decreased breath sounds or wheezing.   Abdominal:      General: Bowel sounds are normal. There is no distension.      Palpations: Abdomen is soft. There is no mass.      Tenderness: There is no abdominal tenderness. There is no guarding or rebound.   Musculoskeletal:         General: No deformity. Normal range of motion.      Cervical back: Normal range of motion and neck supple.      Right lower leg: No tenderness. No edema.      Left lower leg: No tenderness. No edema.   Skin:     General: Skin is warm and dry.      Capillary Refill: Capillary refill takes less than 2 seconds.   Neurological:      General: No focal deficit present.      Mental Status: She is alert and oriented to person, place, and time.      GCS:  "GCS eye subscore is 4. GCS verbal subscore is 5. GCS motor subscore is 6.      Cranial Nerves: No cranial nerve deficit.      Sensory: No sensory deficit.      Deep Tendon Reflexes: Reflexes normal.   Psychiatric:         Mood and Affect: Mood normal.         Behavior: Behavior normal.         Procedures         The EKG was interpreted by myself as well as Dr. Ruiz as sinus rhythm with a rate of 74 no ectopy no ST elevation it was compared to the previous dated 4/13/2022 with no acute changes  ED Course  ED Course as of 12/03/23 2225   Sun Dec 03, 2023   0228 Reevaluation the patient states she has no chest pain no shortness of breath she states it resolved quickly after coming to the emergency room she is wishing to go home I asked her to do her repeat troponin and then I thought it would be safe for her to go home she asked to treat her headache she will be treated with Norco she has her daughter at bedside to take her home. [KW]      ED Course User Index  [KW] Pauline Garcia, APRN      /74   Pulse 67   Temp 98.1 °F (36.7 °C)   Resp 18   Ht 160 cm (63\")   Wt 92.1 kg (203 lb 0.7 oz)   SpO2 97%   BMI 35.97 kg/m²   Labs Reviewed   COMPREHENSIVE METABOLIC PANEL - Abnormal; Notable for the following components:       Result Value    Glucose 292 (*)     BUN 34 (*)     Creatinine 1.27 (*)     BUN/Creatinine Ratio 26.8 (*)     eGFR 44.5 (*)     All other components within normal limits    Narrative:     GFR Normal >60  Chronic Kidney Disease <60  Kidney Failure <15    The GFR formula is only valid for adults with stable renal function between ages 18 and 70.   TROPONIN - Normal    Narrative:     High Sensitive Troponin T Reference Range:  <14.0 ng/L- Negative Female for AMI  <22.0 ng/L- Negative Male for AMI  >=14 - Abnormal Female indicating possible myocardial injury.  >=22 - Abnormal Male indicating possible myocardial injury.   Clinicians would have to utilize clinical acumen, EKG, Troponin, and " serial changes to determine if it is an Acute Myocardial Infarction or myocardial injury due to an underlying chronic condition.        BNP (IN-HOUSE) - Normal    Narrative:     This assay is used as an aid in the diagnosis of individuals suspected of having heart failure. It can be used as an aid in the diagnosis of acute decompensated heart failure (ADHF) in patients presenting with signs and symptoms of ADHF to the emergency department (ED). In addition, NT-proBNP of <300 pg/mL indicates ADHF is not likely.    Age Range Result Interpretation  NT-proBNP Concentration (pg/mL:      <50             Positive            >450                   Gray                 300-450                    Negative             <300    50-75           Positive            >900                  Gray                300-900                  Negative            <300      >75             Positive            >1800                  Gray                300-1800                  Negative            <300   CBC WITH AUTO DIFFERENTIAL - Normal   HIGH SENSITIVITIY TROPONIN T 2HR - Normal    Narrative:     High Sensitive Troponin T Reference Range:  <14.0 ng/L- Negative Female for AMI  <22.0 ng/L- Negative Male for AMI  >=14 - Abnormal Female indicating possible myocardial injury.  >=22 - Abnormal Male indicating possible myocardial injury.   Clinicians would have to utilize clinical acumen, EKG, Troponin, and serial changes to determine if it is an Acute Myocardial Infarction or myocardial injury due to an underlying chronic condition.        CBC AND DIFFERENTIAL    Narrative:     The following orders were created for panel order CBC & Differential.  Procedure                               Abnormality         Status                     ---------                               -----------         ------                     CBC Auto Differential[[id number]]        Normal              Final result                 Please view results for these tests on the  individual orders.     Medications   HYDROcodone-acetaminophen (NORCO) 5-325 MG per tablet 1 tablet (1 tablet Oral Given 12/3/23 0240)   ondansetron (ZOFRAN) tablet 4 mg (4 mg Oral Given 12/3/23 0240)     CT Head Without Contrast    Result Date: 12/3/2023  Impression: Mild atrophy and chronic microvascular ischemia. No acute intracranial process. Electronically Signed: Roberto White MD  12/3/2023 1:39 AM EST  Workstation ID: [id number]    XR Chest 1 View    Result Date: 12/2/2023  Impression: No active disease Electronically Signed: Roberto White MD  12/2/2023 10:27 PM EST  Workstation ID: [id number]                                          Medical Decision Making  Patient is a 73-year-old female who comes in complaining of headache and chest discomfort that started after she had high blood pressure at home and then came to the emergency room I think this was really anxiety driven as on reevaluation she states she feels much better.  Patient had IV established and blood work was obtained her initial troponin was normal CBC and chemistry were normal the patient's blood pressure came down nicely on my reevaluation it was 122 systolic she has no chest pain no shortness of breath she is still complaining of a minor headache she did have a CT of the head without contrast which showed no acute findings no intracranial abnormalities as interpreted by myself as well as the radiologist.  The patient was treated with Norco Zofran second troponin was drawn and found to be low with negative delta.     She was also found to have some mild renal insufficiency with a BUN of 34 and a creatinine of 1.272 months ago chart review shows that the BUN was 18 creatinine 0.7 patient was advised of this and advised to have it rechecked by the primary care provider next week    Patient will be discharged home on her own accord to follow-up with cardiology on an outpatient basis advised to only take her blood pressure once a day to return if  worse    Problems Addressed:  Acute nonintractable headache, unspecified headache type: complicated acute illness or injury  Chest tightness: complicated acute illness or injury  Renal insufficiency, mild: complicated acute illness or injury  Secondary hypertension: complicated acute illness or injury    Amount and/or Complexity of Data Reviewed  External Data Reviewed: labs and notes.  Labs: ordered. Decision-making details documented in ED Course.  Radiology: ordered and independent interpretation performed. Decision-making details documented in ED Course.  ECG/medicine tests: ordered.    Risk  Prescription drug management.        Final diagnoses:   Secondary hypertension   Acute nonintractable headache, unspecified headache type   Chest tightness   Renal insufficiency, mild       ED Disposition  ED Disposition       ED Disposition   Discharge    Condition   Stable    Comment   --               Jose Brenner MD  [address] [place] [address]  [phone]    In 3 days  If symptoms worsen, As needed    Meir Tobin MD  [address] [facility] [place] [address]  [phone]    In 3 days  As needed, If symptoms worsen         Medication List        Changed      metoprolol succinate XL 25 MG 24 hr tablet  Commonly known as: TOPROL-XL  Take 1 tablet by mouth Daily.  What changed: how much to take                 Pauline Garcia, APRN  12/03/23 6660

## 2023-12-03 NOTE — ED NOTES
Pt to ED lobby with IV in place, pt verbalized understanding not to tamper with IV and to let ED staff know immediately if she has to leave so that the IV can be removed.

## 2023-12-03 NOTE — DISCHARGE INSTRUCTIONS
Push clear liquids    Use Tylenol at home if headache is persistent    See Dr. Jose Brenner for recheck in 3 days or your cardiologist if symptoms are persistent or return to the emergency room if worse

## 2024-01-25 ENCOUNTER — LAB (OUTPATIENT)
Dept: LAB | Facility: HOSPITAL | Age: 74
End: 2024-01-25
Payer: MEDICARE

## 2024-01-25 DIAGNOSIS — I10 ESSENTIAL HYPERTENSION: ICD-10-CM

## 2024-01-25 DIAGNOSIS — E03.9 ACQUIRED HYPOTHYROIDISM: ICD-10-CM

## 2024-01-25 DIAGNOSIS — E78.2 MIXED HYPERLIPIDEMIA: ICD-10-CM

## 2024-01-25 DIAGNOSIS — E11.9 TYPE 2 DIABETES MELLITUS WITHOUT COMPLICATION, WITHOUT LONG-TERM CURRENT USE OF INSULIN: ICD-10-CM

## 2024-01-25 LAB
ALBUMIN SERPL-MCNC: 4.2 G/DL (ref 3.5–5.2)
ALBUMIN UR-MCNC: 7.4 MG/DL
ALBUMIN/GLOB SERPL: 1.6 G/DL
ALP SERPL-CCNC: 76 U/L (ref 39–117)
ALT SERPL W P-5'-P-CCNC: 20 U/L (ref 1–33)
ANION GAP SERPL CALCULATED.3IONS-SCNC: 11.2 MMOL/L (ref 5–15)
AST SERPL-CCNC: 17 U/L (ref 1–32)
BILIRUB SERPL-MCNC: 0.4 MG/DL (ref 0–1.2)
BUN SERPL-MCNC: 17 MG/DL (ref 8–23)
BUN/CREAT SERPL: 20.2 (ref 7–25)
CALCIUM SPEC-SCNC: 10 MG/DL (ref 8.6–10.5)
CHLORIDE SERPL-SCNC: 104 MMOL/L (ref 98–107)
CHOLEST SERPL-MCNC: 146 MG/DL (ref 0–200)
CO2 SERPL-SCNC: 26.8 MMOL/L (ref 22–29)
CREAT SERPL-MCNC: 0.84 MG/DL (ref 0.57–1)
CREAT UR-MCNC: 172.1 MG/DL
EGFRCR SERPLBLD CKD-EPI 2021: 73 ML/MIN/1.73
GLOBULIN UR ELPH-MCNC: 2.6 GM/DL
GLUCOSE SERPL-MCNC: 102 MG/DL (ref 65–99)
HBA1C MFR BLD: 7.4 % (ref 4.8–5.6)
HDLC SERPL-MCNC: 43 MG/DL (ref 40–60)
LDLC SERPL CALC-MCNC: 59 MG/DL (ref 0–100)
LDLC/HDLC SERPL: 1.09 {RATIO}
MICROALBUMIN/CREAT UR: 43 MG/G (ref 0–29)
POTASSIUM SERPL-SCNC: 4.2 MMOL/L (ref 3.5–5.2)
PROT SERPL-MCNC: 6.8 G/DL (ref 6–8.5)
SODIUM SERPL-SCNC: 142 MMOL/L (ref 136–145)
T4 FREE SERPL-MCNC: 1.3 NG/DL (ref 0.93–1.7)
TRIGL SERPL-MCNC: 280 MG/DL (ref 0–150)
TSH SERPL DL<=0.05 MIU/L-ACNC: 2.45 UIU/ML (ref 0.27–4.2)
VLDLC SERPL-MCNC: 44 MG/DL (ref 5–40)

## 2024-01-25 PROCEDURE — 80053 COMPREHEN METABOLIC PANEL: CPT

## 2024-01-25 PROCEDURE — 82043 UR ALBUMIN QUANTITATIVE: CPT

## 2024-01-25 PROCEDURE — 83036 HEMOGLOBIN GLYCOSYLATED A1C: CPT

## 2024-01-25 PROCEDURE — 80061 LIPID PANEL: CPT

## 2024-01-25 PROCEDURE — 84439 ASSAY OF FREE THYROXINE: CPT

## 2024-01-25 PROCEDURE — 82570 ASSAY OF URINE CREATININE: CPT

## 2024-01-25 PROCEDURE — 84443 ASSAY THYROID STIM HORMONE: CPT

## 2024-01-25 PROCEDURE — 36415 COLL VENOUS BLD VENIPUNCTURE: CPT

## 2024-02-01 ENCOUNTER — OFFICE VISIT (OUTPATIENT)
Dept: ENDOCRINOLOGY | Facility: CLINIC | Age: 74
End: 2024-02-01
Payer: MEDICARE

## 2024-02-01 VITALS
OXYGEN SATURATION: 96 % | HEART RATE: 88 BPM | HEIGHT: 63 IN | SYSTOLIC BLOOD PRESSURE: 115 MMHG | BODY MASS INDEX: 35.44 KG/M2 | WEIGHT: 200 LBS | DIASTOLIC BLOOD PRESSURE: 72 MMHG

## 2024-02-01 DIAGNOSIS — I10 ESSENTIAL HYPERTENSION: ICD-10-CM

## 2024-02-01 DIAGNOSIS — E11.9 TYPE 2 DIABETES MELLITUS WITHOUT COMPLICATION, WITHOUT LONG-TERM CURRENT USE OF INSULIN: Primary | ICD-10-CM

## 2024-02-01 DIAGNOSIS — E11.42 DIABETIC PERIPHERAL NEUROPATHY: ICD-10-CM

## 2024-02-01 DIAGNOSIS — E78.2 MIXED HYPERLIPIDEMIA: ICD-10-CM

## 2024-02-01 DIAGNOSIS — E03.9 ACQUIRED HYPOTHYROIDISM: ICD-10-CM

## 2024-02-01 LAB — GLUCOSE BLDC GLUCOMTR-MCNC: 184 MG/DL (ref 70–105)

## 2024-02-01 PROCEDURE — 82948 REAGENT STRIP/BLOOD GLUCOSE: CPT | Performed by: INTERNAL MEDICINE

## 2024-02-01 RX ORDER — SEMAGLUTIDE 0.68 MG/ML
INJECTION, SOLUTION SUBCUTANEOUS
COMMUNITY
End: 2024-02-01

## 2024-02-01 RX ORDER — SEMAGLUTIDE 1.34 MG/ML
1 INJECTION, SOLUTION SUBCUTANEOUS WEEKLY
Qty: 3 ML | Refills: 6 | Status: SHIPPED | OUTPATIENT
Start: 2024-02-01

## 2024-02-01 RX ORDER — GLIMEPIRIDE 1 MG/1
TABLET ORAL
COMMUNITY
Start: 2024-01-06 | End: 2024-02-01

## 2024-02-01 RX ORDER — METOPROLOL SUCCINATE 50 MG/1
TABLET, EXTENDED RELEASE ORAL
COMMUNITY
Start: 2023-12-07

## 2024-02-01 RX ORDER — OMEPRAZOLE 40 MG/1
CAPSULE, DELAYED RELEASE ORAL
COMMUNITY
Start: 2024-01-25

## 2024-02-01 RX ORDER — AMLODIPINE BESYLATE AND BENAZEPRIL HYDROCHLORIDE 10; 20 MG/1; MG/1
CAPSULE ORAL
COMMUNITY

## 2024-02-01 RX ORDER — FUROSEMIDE 20 MG/1
1 TABLET ORAL DAILY
COMMUNITY

## 2024-02-01 NOTE — PATIENT INSTRUCTIONS
Increase Ozempic to 1 mg subcu weekly  Continue to work on your diet and activity  Always keep glucose source in case of low blood sugar  DC glimepiride  Labs before follow-up.

## 2024-02-01 NOTE — PROGRESS NOTES
Endocrine Progress Note Outpatient     Patient Care Team:  Jose Brenner MD as PCP - General  Jose Brenner MD as PCP - Family Medicine  Eligio Abraham MD as Consulting Physician (Cardiology)  Preeti Herrera MD as Consulting Physician (Endocrinology)    Chief Complaint: Follow-up type 2 diabetes    HPI: 73-year-old female with history of type 2 diabetes, hypertension, hyperlipidemia and hypothyroidism is here for follow-up.    For type 2 diabetes: She is now back on Ozempic 0.25 mg subcu weekly.  She would like to go on a higher dose.  She is tolerating it well.  She did bring in blood sugar records for review and most of them are running below 140.  She was prescribed glimepiride by her primary care but she has only taken 1 pill.  She is trying to follow her diet the best she can.    Hypertension: Well-controlled    Hypothyroidism: On levothyroxine supplementation    Hyperlipidemia: Currently on rosuvastatin.    She is status post stent placement in the left lower extremity on September 25, 2023.    Past Medical History:   Diagnosis Date    Disease of thyroid gland     Diverticulitis     Exertional dyspnea 8/6/2021    Heart murmur     Hyperlipidemia     Hypertension     Type 2 diabetes mellitus     Uncontrolled type 2 diabetes mellitus with hyperglycemia 7/30/2019    Unstable angina 8/6/2021       Social History     Socioeconomic History    Marital status:     Number of children: 3    Years of education: 12   Tobacco Use    Smoking status: Former    Smokeless tobacco: Never   Vaping Use    Vaping Use: Never used   Substance and Sexual Activity    Alcohol use: Yes     Comment: occasonally / social    Drug use: Defer    Sexual activity: Defer       Family History   Problem Relation Age of Onset    Stroke Mother     Cancer Father         prostate    Heart disease Brother     Cancer Brother         pranciritic    Cancer Maternal Grandmother         colon    Heart disease Paternal Grandmother         Allergies   Allergen Reactions    Amoxicillin-Pot Clavulanate Diarrhea    Ciprofloxacin Myalgia    Amoxicillin Diarrhea    Clavulanic Acid Diarrhea    Flagyl [Metronidazole] Myalgia       ROS:   Constitutional:  Admit fatigue, tiredness.    Eyes:  Denies change in visual acuity   HENT:  Denies nasal congestion or sore throat   Respiratory: denies cough, admit shortness of breath.   Cardiovascular:  denies chest pain, edema   GI:  Denies abdominal pain, nausea, vomiting.   Musculoskeletal:  Denies back pain or joint pain   Integument:  Denies dry skin and rash   Neurologic:  Denies headache, focal weakness or sensory changes   Endocrine:  Denies polyuria or polydipsia   Psychiatric:  Denies depression or anxiety      Vitals:    02/01/24 1451   BP: 115/72   Pulse: 88   SpO2: 96%     BMI: 35.4    Physical Exam:  GEN: NAD, conversant, obese  CHEST: CTA  CVS: RRR  PSYCH: AOX3, appropriate mood, affect normal      Results Review:     I reviewed the patient's new clinical results.    Lab Results   Component Value Date    HGBA1C 7.40 (H) 01/25/2024    HGBA1C 7.40 (H) 09/26/2023    HGBA1C 6.5 (H) 10/18/2022      Lab Results   Component Value Date    GLUCOSE 102 (H) 01/25/2024    BUN 17 01/25/2024    CREATININE 0.84 01/25/2024    EGFRIFNONA 47 (L) 02/22/2022    BCR 20.2 01/25/2024    K 4.2 01/25/2024    CO2 26.8 01/25/2024    CALCIUM 10.0 01/25/2024    ALBUMIN 4.2 01/25/2024    LABIL2 2.1 (H) 06/01/2019    AST 17 01/25/2024    ALT 20 01/25/2024    CHOL 146 01/25/2024    TRIG 280 (H) 01/25/2024    LDL 59 01/25/2024    HDL 43 01/25/2024     Lab Results   Component Value Date    TSH 2.450 01/25/2024    FREET4 1.30 01/25/2024         Medication Review: Reviewed.       Current Outpatient Medications:     amLODIPine-benazepril (LOTREL) 10-20 MG per capsule, , Disp: , Rfl:     Cholecalciferol (Vitamin D3) 50 MCG (2000 UT) capsule, TAKE ONE CAPSULE BY MOUTH DAILY, Disp: 90 capsule, Rfl: 3    clopidogrel (PLAVIX) 75 MG tablet,  Take 1 tablet by mouth Daily., Disp: , Rfl:     DULoxetine (CYMBALTA) 60 MG capsule, Take 1 capsule by mouth Daily., Disp: , Rfl:     glimepiride (AMARYL) 1 MG tablet, , Disp: , Rfl:     Lasix 20 MG tablet, Take 1 tablet by mouth Daily., Disp: , Rfl:     latanoprost (XALATAN) 0.005 % ophthalmic solution, Administer 1 drop to both eyes Every Night., Disp: , Rfl:     levothyroxine (SYNTHROID, LEVOTHROID) 100 MCG tablet, TAKE 1 TABLET DAILY, Disp: 90 tablet, Rfl: 2    metoprolol succinate XL (TOPROL-XL) 50 MG 24 hr tablet, , Disp: , Rfl:     multivitamin with minerals (Centrum Women) tablet tablet, Take  by mouth Daily., Disp: , Rfl:     omeprazole (priLOSEC) 40 MG capsule, , Disp: , Rfl:     Ozempic, 0.25 or 0.5 MG/DOSE, 2 MG/3ML solution pen-injector, , Disp: , Rfl:     rosuvastatin (Crestor) 10 MG tablet, Take 1 tablet by mouth Daily., Disp: 90 tablet, Rfl: 2    Current Facility-Administered Medications:     ondansetron ODT (ZOFRAN-ODT) disintegrating tablet 8 mg, 8 mg, Oral, Q6H PRN, Eligio Abraham MD      Assessment & Plan   1.  Diabetes mellitus type 2 with hyperglycemia: Uncontrolled with A1c at 7.4%.  Will DC glimepiride, will increase Ozempic to 1 mg subcu weekly and continue to work on diet and activity and will follow blood sugars and A1c.    2.  Hypertension: Well-controlled, continue current medications.    3.  Hyperlipidemia: LDL well-controlled, triglycerides improving.  Continue rosuvastatin.    4.  Hypothyroidism: Well-controlled, continue levothyroxine.    5.  Diabetic peripheral neuropathy: Overall stable.    Assessment and plan from September 26, 2023 reviewed and updated.                Preeti Herrera MD FACE.

## 2024-02-26 ENCOUNTER — PATIENT MESSAGE (OUTPATIENT)
Dept: ENDOCRINOLOGY | Facility: CLINIC | Age: 74
End: 2024-02-26
Payer: MEDICARE

## 2024-02-26 RX ORDER — SEMAGLUTIDE 0.68 MG/ML
0.5 INJECTION, SOLUTION SUBCUTANEOUS WEEKLY
Qty: 2 ML | Refills: 3 | Status: SHIPPED | OUTPATIENT
Start: 2024-02-26 | End: 2024-02-29 | Stop reason: SDUPTHER

## 2024-02-29 RX ORDER — SEMAGLUTIDE 0.68 MG/ML
1 INJECTION, SOLUTION SUBCUTANEOUS WEEKLY
Qty: 2 ML | Refills: 3 | Status: SHIPPED | OUTPATIENT
Start: 2024-02-29

## 2024-03-12 RX ORDER — SEMAGLUTIDE 1.34 MG/ML
1 INJECTION, SOLUTION SUBCUTANEOUS WEEKLY
Qty: 3 ML | Refills: 6 | Status: SHIPPED | OUTPATIENT
Start: 2024-03-12

## 2024-03-29 ENCOUNTER — OFFICE VISIT (OUTPATIENT)
Dept: PODIATRY | Facility: CLINIC | Age: 74
End: 2024-03-29
Payer: MEDICARE

## 2024-03-29 VITALS
HEIGHT: 63 IN | WEIGHT: 200 LBS | HEART RATE: 73 BPM | RESPIRATION RATE: 20 BRPM | BODY MASS INDEX: 35.44 KG/M2 | OXYGEN SATURATION: 96 %

## 2024-03-29 DIAGNOSIS — L85.3 XEROSIS OF SKIN: ICD-10-CM

## 2024-03-29 DIAGNOSIS — M79.672 LEFT FOOT PAIN: ICD-10-CM

## 2024-03-29 DIAGNOSIS — E11.65 TYPE 2 DIABETES MELLITUS WITH HYPERGLYCEMIA, WITHOUT LONG-TERM CURRENT USE OF INSULIN: Primary | ICD-10-CM

## 2024-03-29 RX ORDER — ASPIRIN 81 MG/1
81 TABLET ORAL DAILY
COMMUNITY

## 2024-03-29 NOTE — PROGRESS NOTES
03/29/2024  Foot and Ankle Surgery - Established Patient/Follow-up  Provider: MARYAM Olivo   Location: [facility]    Subjective:  Katty Ramirez is a 74 y.o. female.     Chief Complaint   Patient presents with    Left Foot - Follow-up, Diabetes     Dm foot check     Right Foot - Follow-up, Diabetes     Dm foot check     Follow-up     LAILA Brenner 12/03/2023       HPI:  The patient has previously seen Dr. Juan and is new to this provider. She is here today for routine diabetic foot check. Her last A1c was 6.4 percent.    The patient reports she is on Ozempic for her diabetes. She denies any history of significant wounds or infections to her feet. Her feet are very ticklish. She also denies any numbness or tingling. She reports in the evenings, she has pain. She reports she wears shoes even in the house because they are sensitive. She states she has tenderness if she touches her foot a certain way. She states she has Voltaren gel at home. Dr. Juan had given her some inserts, which worked well, however they are over 1 year old now.    She is not on dialysis.    Allergies   Allergen Reactions    Amoxicillin-Pot Clavulanate Diarrhea    Ciprofloxacin Myalgia    Amoxicillin Diarrhea    Clavulanic Acid Diarrhea    Flagyl [Metronidazole] Myalgia       Current Outpatient Medications on File Prior to Visit   Medication Sig Dispense Refill    amLODIPine-benazepril (LOTREL) 10-20 MG per capsule       aspirin 81 MG EC tablet Take 1 tablet by mouth Daily.      Cholecalciferol (Vitamin D3) 50 MCG (2000 UT) capsule TAKE ONE CAPSULE BY MOUTH DAILY 90 capsule 3    clopidogrel (PLAVIX) 75 MG tablet Take 1 tablet by mouth Daily.      DULoxetine (CYMBALTA) 60 MG capsule Take 1 capsule by mouth Daily.      Lasix 20 MG tablet Take 1 tablet by mouth Daily.      latanoprost (XALATAN) 0.005 % ophthalmic solution Administer 1 drop to both eyes Every Night.      levothyroxine (SYNTHROID, LEVOTHROID) 100 MCG tablet TAKE 1  "TABLET DAILY 90 tablet 2    metoprolol succinate XL (TOPROL-XL) 50 MG 24 hr tablet       multivitamin with minerals (Centrum Women) tablet tablet Take  by mouth Daily.      omeprazole (priLOSEC) 40 MG capsule       rosuvastatin (Crestor) 10 MG tablet Take 1 tablet by mouth Daily. 90 tablet 2    Semaglutide, 1 MG/DOSE, (Ozempic, 1 MG/DOSE,) 4 MG/3ML solution pen-injector Inject 1 mg under the skin into the appropriate area as directed 1 (One) Time Per Week. 3 mL 6     Current Facility-Administered Medications on File Prior to Visit   Medication Dose Route Frequency Provider Last Rate Last Admin    ondansetron ODT (ZOFRAN-ODT) disintegrating tablet 8 mg  8 mg Oral Q6H PRN Eligio Abraham MD           Objective   Pulse 73   Resp 20   Ht 160 cm (63\")   Wt 90.7 kg (200 lb)   SpO2 96%   BMI 35.43 kg/m²     Foot/Ankle Exam    GENERAL  Orientation:  AAOx3  Affect:  appropriate    VASCULAR     Right Foot Vascularity   Normal vascular exam    Dorsalis pedis:  2+  Posterior tibial:  2+  Skin temperature:  warm  Edema grading:  None  CFT:  < 3 seconds  Pedal hair growth:  Present  Varicosities:  none     Left Foot Vascularity   Normal vascular exam    Dorsalis pedis:  2+  Posterior tibial:  2+  Skin temperature:  warm  Edema grading:  None  CFT:  < 3 seconds  Pedal hair growth:  Present  Varicosities:  none     NEUROLOGIC     Right Foot Neurologic   Light touch sensation: normal  Hot/Cold sensation: normal  Achilles reflex:  2+     Left Foot Neurologic   Light touch sensation: normal  Hot/Cold sensation:  normal  Achilles reflex:  2+    MUSCULOSKELETAL     Right Foot Musculoskeletal   Arch:  Normal     Left Foot Musculoskeletal   Arch:  Normal    MUSCLE STRENGTH     Right Foot Muscle Strength   Normal strength    Foot dorsiflexion:  5  Foot plantar flexion:  5  Foot inversion:  5  Foot eversion:  5     Left Foot Muscle Strength   Normal strength    Foot dorsiflexion:  5  Foot plantar flexion:  5  Foot inversion: "  5  Foot eversion:  5    DERMATOLOGIC      Right Foot Dermatologic   Skin  Right foot skin is intact.   Nails comment:  Nails 1-5     Left Foot Dermatologic   Skin  Left foot skin is intact.   Nails comment:  Nails 1-5    TESTS     Right Foot Tests   Anterior drawer: negative  Varus tilt: negative     Left Foot Tests   Anterior drawer: negative  Varus tilt: negative     Right foot additional comments: Dry skin bilateral feet. Bilateral feet are stable with no open wounds or signs of active acute infection.     Left foot additional comments: Dry skin bilateral feet. Bilateral feet are stable with no open wounds or signs of active acute infection.          Assessment & Plan   Diagnoses and all orders for this visit:    1. Type 2 diabetes mellitus with hyperglycemia, without long-term current use of insulin (Primary)    2. Left foot pain    3. Xerosis of skin      1. Routine diabetic foot check.  The patient is here for a routine diabetic foot check. I do feel the patient is at mild risk for pedal complications related to diabetes. [place]- [facility] testing was 10 out of 10 to bilateral feet today. We discussed that I recommend Voltaren gel to left dorsal foot as needed. The patient declines x-ray today.    Follow-up  The patient will follow up in 1 year or sooner if symptoms persist or do not improve or worsen    No orders of the defined types were placed in this encounter.         Note is dictated utilizing voice recognition software. Unfortunately this leads to occasional typographical errors. I apologize in advance if the situation occurs. If questions occur please do not hesitate to call our office.     Transcribed from ambient dictation for MARYAM Sandoval by Mona Red.  03/29/24   11:52 EDT    Patient or patient representative verbalized consent to the visit recording.  I have personally performed the services described in this document as transcribed by the above individual, and it is both  accurate and complete.  Brandi Santiago, APRN  3/29/2024  13:02 EDT

## 2024-06-23 DIAGNOSIS — E78.2 MIXED HYPERLIPIDEMIA: ICD-10-CM

## 2024-06-23 DIAGNOSIS — I10 ESSENTIAL HYPERTENSION: ICD-10-CM

## 2024-06-23 DIAGNOSIS — E11.9 TYPE 2 DIABETES MELLITUS WITHOUT COMPLICATION, WITHOUT LONG-TERM CURRENT USE OF INSULIN: ICD-10-CM

## 2024-06-23 DIAGNOSIS — E03.9 ACQUIRED HYPOTHYROIDISM: ICD-10-CM

## 2024-06-24 RX ORDER — ROSUVASTATIN CALCIUM 10 MG/1
10 TABLET, COATED ORAL DAILY
Qty: 90 TABLET | Refills: 2 | Status: SHIPPED | OUTPATIENT
Start: 2024-06-24

## 2024-06-28 ENCOUNTER — APPOINTMENT (OUTPATIENT)
Dept: CT IMAGING | Facility: HOSPITAL | Age: 74
DRG: 392 | End: 2024-06-28
Payer: MEDICARE

## 2024-06-28 ENCOUNTER — HOSPITAL ENCOUNTER (INPATIENT)
Facility: HOSPITAL | Age: 74
LOS: 1 days | Discharge: HOME | DRG: 392 | End: 2024-06-30
Attending: INTERNAL MEDICINE | Admitting: INTERNAL MEDICINE
Payer: MEDICARE

## 2024-06-28 DIAGNOSIS — R10.13 EPIGASTRIC PAIN: Primary | ICD-10-CM

## 2024-06-28 LAB
ALBUMIN SERPL-MCNC: 4.8 G/DL (ref 3.5–5.2)
ALBUMIN/GLOB SERPL: 1.4 G/DL
ALP SERPL-CCNC: 99 U/L (ref 39–117)
ALT SERPL W P-5'-P-CCNC: 20 U/L (ref 1–33)
AMYLASE SERPL-CCNC: 87 U/L (ref 28–100)
ANION GAP SERPL CALCULATED.3IONS-SCNC: 15 MMOL/L (ref 5–15)
AST SERPL-CCNC: 23 U/L (ref 1–32)
BASOPHILS # BLD AUTO: 0.02 10*3/MM3 (ref 0–0.2)
BASOPHILS NFR BLD AUTO: 0.2 % (ref 0–1.5)
BILIRUB SERPL-MCNC: 0.7 MG/DL (ref 0–1.2)
BUN SERPL-MCNC: 16 MG/DL (ref 8–23)
BUN/CREAT SERPL: 17.6 (ref 7–25)
CALCIUM SPEC-SCNC: 10.9 MG/DL (ref 8.6–10.5)
CHLORIDE SERPL-SCNC: 102 MMOL/L (ref 98–107)
CO2 SERPL-SCNC: 23 MMOL/L (ref 22–29)
CREAT SERPL-MCNC: 0.91 MG/DL (ref 0.57–1)
DEPRECATED RDW RBC AUTO: 45.5 FL (ref 37–54)
EGFRCR SERPLBLD CKD-EPI 2021: 65.9 ML/MIN/1.73
EOSINOPHIL # BLD AUTO: 0.02 10*3/MM3 (ref 0–0.4)
EOSINOPHIL NFR BLD AUTO: 0.2 % (ref 0.3–6.2)
ERYTHROCYTE [DISTWIDTH] IN BLOOD BY AUTOMATED COUNT: 13.2 % (ref 12.3–15.4)
GLOBULIN UR ELPH-MCNC: 3.5 GM/DL
GLUCOSE SERPL-MCNC: 101 MG/DL (ref 65–99)
HCT VFR BLD AUTO: 46.9 % (ref 34–46.6)
HGB BLD-MCNC: 15.2 G/DL (ref 12–15.9)
IMM GRANULOCYTES # BLD AUTO: 0.03 10*3/MM3 (ref 0–0.05)
IMM GRANULOCYTES NFR BLD AUTO: 0.4 % (ref 0–0.5)
LYMPHOCYTES # BLD AUTO: 1.64 10*3/MM3 (ref 0.7–3.1)
LYMPHOCYTES NFR BLD AUTO: 19.3 % (ref 19.6–45.3)
MCH RBC QN AUTO: 30.5 PG (ref 26.6–33)
MCHC RBC AUTO-ENTMCNC: 32.4 G/DL (ref 31.5–35.7)
MCV RBC AUTO: 94 FL (ref 79–97)
MONOCYTES # BLD AUTO: 0.54 10*3/MM3 (ref 0.1–0.9)
MONOCYTES NFR BLD AUTO: 6.4 % (ref 5–12)
NEUTROPHILS NFR BLD AUTO: 6.24 10*3/MM3 (ref 1.7–7)
NEUTROPHILS NFR BLD AUTO: 73.5 % (ref 42.7–76)
NRBC BLD AUTO-RTO: 0 /100 WBC (ref 0–0.2)
PLATELET # BLD AUTO: 264 10*3/MM3 (ref 140–450)
PMV BLD AUTO: 10.1 FL (ref 6–12)
POTASSIUM SERPL-SCNC: 4.5 MMOL/L (ref 3.5–5.2)
PROT SERPL-MCNC: 8.3 G/DL (ref 6–8.5)
QT INTERVAL: 378 MS
QTC INTERVAL: 384 MS
RBC # BLD AUTO: 4.99 10*6/MM3 (ref 3.77–5.28)
RBC MORPH BLD: NORMAL
SMALL PLATELETS BLD QL SMEAR: ADEQUATE
SODIUM SERPL-SCNC: 140 MMOL/L (ref 136–145)
TROPONIN T SERPL HS-MCNC: 11 NG/L
WBC MORPH BLD: NORMAL
WBC NRBC COR # BLD AUTO: 8.49 10*3/MM3 (ref 3.4–10.8)

## 2024-06-28 PROCEDURE — 25510000001 IOPAMIDOL PER 1 ML: Performed by: INTERNAL MEDICINE

## 2024-06-28 PROCEDURE — 82150 ASSAY OF AMYLASE: CPT | Performed by: INTERNAL MEDICINE

## 2024-06-28 PROCEDURE — 80053 COMPREHEN METABOLIC PANEL: CPT | Performed by: INTERNAL MEDICINE

## 2024-06-28 PROCEDURE — 85007 BL SMEAR W/DIFF WBC COUNT: CPT | Performed by: INTERNAL MEDICINE

## 2024-06-28 PROCEDURE — 83690 ASSAY OF LIPASE: CPT

## 2024-06-28 PROCEDURE — 74177 CT ABD & PELVIS W/CONTRAST: CPT

## 2024-06-28 PROCEDURE — G0378 HOSPITAL OBSERVATION PER HR: HCPCS

## 2024-06-28 PROCEDURE — 25010000002 ONDANSETRON PER 1 MG: Performed by: INTERNAL MEDICINE

## 2024-06-28 PROCEDURE — 25810000003 SODIUM CHLORIDE 0.9 % SOLUTION: Performed by: INTERNAL MEDICINE

## 2024-06-28 PROCEDURE — 93010 ELECTROCARDIOGRAM REPORT: CPT | Performed by: INTERNAL MEDICINE

## 2024-06-28 PROCEDURE — 85025 COMPLETE CBC W/AUTO DIFF WBC: CPT | Performed by: INTERNAL MEDICINE

## 2024-06-28 PROCEDURE — 93005 ELECTROCARDIOGRAM TRACING: CPT | Performed by: INTERNAL MEDICINE

## 2024-06-28 PROCEDURE — 84484 ASSAY OF TROPONIN QUANT: CPT | Performed by: INTERNAL MEDICINE

## 2024-06-28 RX ORDER — DULOXETIN HYDROCHLORIDE 30 MG/1
30 CAPSULE, DELAYED RELEASE ORAL DAILY
Status: DISCONTINUED | OUTPATIENT
Start: 2024-06-28 | End: 2024-06-30 | Stop reason: HOSPADM

## 2024-06-28 RX ORDER — MORPHINE SULFATE 2 MG/ML
2 INJECTION, SOLUTION INTRAMUSCULAR; INTRAVENOUS
Status: DISCONTINUED | OUTPATIENT
Start: 2024-06-28 | End: 2024-06-30 | Stop reason: HOSPADM

## 2024-06-28 RX ORDER — LATANOPROST 50 UG/ML
1 SOLUTION/ DROPS OPHTHALMIC NIGHTLY
Status: DISCONTINUED | OUTPATIENT
Start: 2024-06-28 | End: 2024-06-30 | Stop reason: HOSPADM

## 2024-06-28 RX ORDER — SUCRALFATE 1 G/1
1 TABLET ORAL
Status: DISCONTINUED | OUTPATIENT
Start: 2024-06-28 | End: 2024-06-30 | Stop reason: HOSPADM

## 2024-06-28 RX ORDER — METOPROLOL SUCCINATE 50 MG/1
50 TABLET, EXTENDED RELEASE ORAL
Status: DISCONTINUED | OUTPATIENT
Start: 2024-06-28 | End: 2024-06-30 | Stop reason: HOSPADM

## 2024-06-28 RX ORDER — ROSUVASTATIN CALCIUM 10 MG/1
10 TABLET, COATED ORAL NIGHTLY
Status: DISCONTINUED | OUTPATIENT
Start: 2024-06-28 | End: 2024-06-30 | Stop reason: HOSPADM

## 2024-06-28 RX ORDER — ONDANSETRON 2 MG/ML
4 INJECTION INTRAMUSCULAR; INTRAVENOUS EVERY 6 HOURS PRN
Status: DISCONTINUED | OUTPATIENT
Start: 2024-06-28 | End: 2024-06-30 | Stop reason: HOSPADM

## 2024-06-28 RX ORDER — SODIUM CHLORIDE 9 MG/ML
40 INJECTION, SOLUTION INTRAVENOUS AS NEEDED
Status: DISCONTINUED | OUTPATIENT
Start: 2024-06-28 | End: 2024-06-30 | Stop reason: HOSPADM

## 2024-06-28 RX ORDER — BISACODYL 5 MG/1
5 TABLET, DELAYED RELEASE ORAL DAILY PRN
Status: DISCONTINUED | OUTPATIENT
Start: 2024-06-28 | End: 2024-06-30 | Stop reason: HOSPADM

## 2024-06-28 RX ORDER — POLYETHYLENE GLYCOL 3350 17 G/17G
17 POWDER, FOR SOLUTION ORAL DAILY PRN
Status: DISCONTINUED | OUTPATIENT
Start: 2024-06-28 | End: 2024-06-30 | Stop reason: HOSPADM

## 2024-06-28 RX ORDER — SODIUM CHLORIDE 0.9 % (FLUSH) 0.9 %
10 SYRINGE (ML) INJECTION EVERY 12 HOURS SCHEDULED
Status: DISCONTINUED | OUTPATIENT
Start: 2024-06-28 | End: 2024-06-30 | Stop reason: HOSPADM

## 2024-06-28 RX ORDER — AMOXICILLIN 250 MG
2 CAPSULE ORAL 2 TIMES DAILY PRN
Status: DISCONTINUED | OUTPATIENT
Start: 2024-06-28 | End: 2024-06-30 | Stop reason: HOSPADM

## 2024-06-28 RX ORDER — LEVOTHYROXINE SODIUM 0.1 MG/1
100 TABLET ORAL
Status: DISCONTINUED | OUTPATIENT
Start: 2024-06-29 | End: 2024-06-30 | Stop reason: HOSPADM

## 2024-06-28 RX ORDER — PANTOPRAZOLE SODIUM 40 MG/10ML
40 INJECTION, POWDER, LYOPHILIZED, FOR SOLUTION INTRAVENOUS
Status: DISCONTINUED | OUTPATIENT
Start: 2024-06-28 | End: 2024-06-30 | Stop reason: HOSPADM

## 2024-06-28 RX ORDER — SODIUM CHLORIDE 9 MG/ML
100 INJECTION, SOLUTION INTRAVENOUS CONTINUOUS
Status: DISCONTINUED | OUTPATIENT
Start: 2024-06-28 | End: 2024-06-30 | Stop reason: HOSPADM

## 2024-06-28 RX ORDER — BISACODYL 10 MG
10 SUPPOSITORY, RECTAL RECTAL DAILY PRN
Status: DISCONTINUED | OUTPATIENT
Start: 2024-06-28 | End: 2024-06-30 | Stop reason: HOSPADM

## 2024-06-28 RX ORDER — PANTOPRAZOLE SODIUM 40 MG/10ML
40 INJECTION, POWDER, LYOPHILIZED, FOR SOLUTION INTRAVENOUS
Status: DISCONTINUED | OUTPATIENT
Start: 2024-06-29 | End: 2024-06-28

## 2024-06-28 RX ORDER — NITROGLYCERIN 0.4 MG/1
0.4 TABLET SUBLINGUAL
Status: DISCONTINUED | OUTPATIENT
Start: 2024-06-28 | End: 2024-06-30 | Stop reason: HOSPADM

## 2024-06-28 RX ORDER — SODIUM CHLORIDE 0.9 % (FLUSH) 0.9 %
10 SYRINGE (ML) INJECTION AS NEEDED
Status: DISCONTINUED | OUTPATIENT
Start: 2024-06-28 | End: 2024-06-30 | Stop reason: HOSPADM

## 2024-06-28 RX ADMIN — SODIUM CHLORIDE 100 ML/HR: 9 INJECTION, SOLUTION INTRAVENOUS at 16:04

## 2024-06-28 RX ADMIN — PANTOPRAZOLE SODIUM 40 MG: 40 INJECTION, POWDER, FOR SOLUTION INTRAVENOUS at 20:25

## 2024-06-28 RX ADMIN — ROSUVASTATIN 10 MG: 10 TABLET, FILM COATED ORAL at 20:25

## 2024-06-28 RX ADMIN — ONDANSETRON 4 MG: 2 INJECTION INTRAMUSCULAR; INTRAVENOUS at 20:46

## 2024-06-28 RX ADMIN — IOPAMIDOL 100 ML: 755 INJECTION, SOLUTION INTRAVENOUS at 21:16

## 2024-06-28 RX ADMIN — SUCRALFATE 1 G: 1 TABLET ORAL at 20:25

## 2024-06-28 RX ADMIN — DULOXETINE HYDROCHLORIDE 30 MG: 30 CAPSULE, DELAYED RELEASE ORAL at 15:42

## 2024-06-28 RX ADMIN — Medication 10 ML: at 20:26

## 2024-06-28 RX ADMIN — METOPROLOL SUCCINATE 50 MG: 50 TABLET, EXTENDED RELEASE ORAL at 15:42

## 2024-06-28 RX ADMIN — LATANOPROST 1 DROP: 50 SOLUTION/ DROPS OPHTHALMIC at 21:22

## 2024-06-28 RX ADMIN — ONDANSETRON 4 MG: 2 INJECTION INTRAMUSCULAR; INTRAVENOUS at 15:29

## 2024-06-28 RX ADMIN — SUCRALFATE 1 G: 1 TABLET ORAL at 15:42

## 2024-06-28 NOTE — PLAN OF CARE
Goal Outcome Evaluation:               Patient is new admit, with complaints of abd. Pain

## 2024-06-28 NOTE — H&P
Patient Care Team:  Jose Brenner MD as PCP - General  Jose Brenner MD as PCP - Family Medicine  JaradEligio acuna MD as Consulting Physician (Cardiology)  Preeti Herrera MD as Consulting Physician (Endocrinology)    Chief complaint epigastric pain  Subjective     Patient is a 74 y.o. female with history of Clement's esophagus, duodenal ulcer, previous Nissen fundoplication who presents as a direct admit from her PCP office with severe epigastric pain.  Patient relates that she recently was on vacation in [place] and [place] where she was drinking 1 alcoholic beverage daily, which is unusual for her.  She was fine while she was overseas but shortly after she got home started having epigastric, severe pain.  She does not take any ibuprofen Aleve or other NSAIDs.  Tums have been of no benefit.  She has a prior history of peptic ulcer disease and states this pain is identical to that pain.  Pain is exacerbated by any oral intake.     Review of patient's past medical history shows that she had severe duodenal ulcer with a question of duodenal perforation in January 2023.    Review of Systems   Constitutional:  Positive for activity change and appetite change. Negative for diaphoresis and fatigue.   HENT:  Negative for facial swelling.    Eyes:  Negative for visual disturbance.   Respiratory:  Negative for cough, shortness of breath, wheezing and stridor.    Cardiovascular:  Negative for chest pain, palpitations and leg swelling.   Gastrointestinal:  Positive for abdominal pain and nausea. Negative for constipation, diarrhea and vomiting.   Endocrine: Negative for polyuria.   Genitourinary:  Negative for dysuria.   Musculoskeletal:  Negative for arthralgias, back pain and gait problem.   Neurological:  Negative for dizziness, light-headedness and headaches.   Psychiatric/Behavioral:  Negative for confusion.           History  Past Medical History:   Diagnosis Date    Bunion     Callus     Chronic  kidney disease     Difficulty walking I have moderate PAD    Disease of thyroid gland     Diverticulitis     Exertional dyspnea 2021    Heart murmur     History of transfusion Back in 1969    Hyperlipidemia     Hypertension     Ingrown toenail     Plantar fasciitis about 12 years ago    Type 2 diabetes mellitus     Uncontrolled type 2 diabetes mellitus with hyperglycemia 2019    Unstable angina 2021     Past Surgical History:   Procedure Laterality Date    CARDIAC CATHETERIZATION N/A 2021    Procedure: Left Heart Cath;  Surgeon: Eligio Abraham MD;  Location: [facility] CATH INVASIVE LOCATION;  Service: Cardiology;  Laterality: N/A;    CATARACT EXTRACTION  2014    COLON RESECTION  2016    HERNIA REPAIR  2016    LEG SURGERY  2023    Vascular on left leg     Family History   Problem Relation Age of Onset    Stroke Mother     Cancer Father         Prostrate    Heart disease Brother     Cancer Brother         Pancreas    Cancer Maternal Grandmother         Colon    Heart disease Paternal Grandmother      Social History     Tobacco Use    Smoking status: Former     Current packs/day: 0.00     Average packs/day: 1 pack/day for 45.0 years (45.0 ttl pk-yrs)     Types: Cigarettes     Start date: 1967     Quit date: 2012     Years since quittin.0    Smokeless tobacco: Never    Tobacco comments:     quit several times for brief periods (pregnancies, etc.) Maybe more like 40 years   Vaping Use    Vaping status: Never Used   Substance Use Topics    Alcohol use: Yes     Comment: Not a teetotaler, but maybe 8-10 drinks a year    Drug use: Never     Facility-Administered Medications Prior to Admission   Medication Dose Route Frequency Provider Last Rate Last Admin    ondansetron ODT (ZOFRAN-ODT) disintegrating tablet 8 mg  8 mg Oral Q6H PRN Eligio Abraham MD         Medications Prior to Admission   Medication Sig Dispense Refill Last Dose     levothyroxine (SYNTHROID, LEVOTHROID) 100 MCG tablet TAKE 1 TABLET DAILY 90 tablet 2 6/28/2024    amLODIPine-benazepril (LOTREL) 10-20 MG per capsule        aspirin 81 MG EC tablet Take 1 tablet by mouth Daily.       Cholecalciferol (Vitamin D3) 50 MCG (2000 UT) capsule TAKE ONE CAPSULE BY MOUTH DAILY 90 capsule 3     clopidogrel (PLAVIX) 75 MG tablet Take 1 tablet by mouth Daily.       DULoxetine (CYMBALTA) 60 MG capsule Take 1 capsule by mouth Daily.       Lasix 20 MG tablet Take 1 tablet by mouth Daily.       latanoprost (XALATAN) 0.005 % ophthalmic solution Administer 1 drop to both eyes Every Night.       metoprolol succinate XL (TOPROL-XL) 50 MG 24 hr tablet        multivitamin with minerals (Centrum Women) tablet tablet Take  by mouth Daily.       omeprazole (priLOSEC) 40 MG capsule        rosuvastatin (CRESTOR) 10 MG tablet TAKE 1 TABLET BY MOUTH DAILY 90 tablet 2     Semaglutide, 1 MG/DOSE, (Ozempic, 1 MG/DOSE,) 4 MG/3ML solution pen-injector Inject 1 mg under the skin into the appropriate area as directed 1 (One) Time Per Week. 3 mL 6      Allergies:  Amoxicillin-pot clavulanate, Ciprofloxacin, Amoxicillin, Clavulanic acid, and Flagyl [metronidazole]    Objective     Vital Signs  Temp:  [98.6 °F (37 °C)] 98.6 °F (37 °C)  Heart Rate:  [75] 75  Resp:  [19] 19  BP: (160)/(79) 160/79     Physical Exam:      General Appearance:    Alert, cooperative, in no acute distress, uncomfortable   Head:    Normocephalic, without obvious abnormality, atraumatic   Eyes:            Lids and lashes normal, conjunctivae and sclerae normal, no   icterus, no pallor, corneas clear, PERRLA   Ears:    Ears appear intact with no abnormalities noted   Throat:   No oral lesions, no thrush, oral mucosa moist   Neck:   No adenopathy, supple, trachea midline, no thyromegaly, no   carotid bruit, no JVD   Lungs:     Clear to auscultation,respirations regular, even and                  unlabored    Heart:    Regular rhythm and normal rate,  normal S1 and S2, no            murmur, no gallop, no rub, no click   Chest Wall:    No abnormalities observed   Abdomen:   Epigastric tenderness without rebound or guarding   Extremities:   Moves all extremities well, no edema, no cyanosis, no             redness   Pulses:   Pulses palpable and equal bilaterally   Skin:   No bleeding, bruising or rash   Lymph nodes:   No palpable adenopathy   Neurologic:   Cranial nerves 2 - 12 grossly intact, sensation intact, DTR       present and equal bilaterally       Results Review:     Imaging Results (Last 24 Hours)       ** No results found for the last 24 hours. **             Lab Results (last 24 hours)       Procedure Component Value Units Date/Time    Scan Slide [[id number]] Collected: 06/28/24 1522    Specimen: Blood from Arm, Right Updated: 06/28/24 1628     RBC Morphology Normal     WBC Morphology Normal     Platelet Estimate Adequate    CBC Auto Differential [[id number]]  (Abnormal) Collected: 06/28/24 1522    Specimen: Blood from Arm, Right Updated: 06/28/24 1628     WBC 8.49 10*3/mm3      RBC 4.99 10*6/mm3      Hemoglobin 15.2 g/dL      Hematocrit 46.9 %      MCV 94.0 fL      MCH 30.5 pg      MCHC 32.4 g/dL      RDW 13.2 %      RDW-SD 45.5 fl      MPV 10.1 fL      Platelets 264 10*3/mm3      Neutrophil % 73.5 %      Lymphocyte % 19.3 %      Monocyte % 6.4 %      Eosinophil % 0.2 %      Basophil % 0.2 %      Immature Grans % 0.4 %      Neutrophils, Absolute 6.24 10*3/mm3      Lymphocytes, Absolute 1.64 10*3/mm3      Monocytes, Absolute 0.54 10*3/mm3      Eosinophils, Absolute 0.02 10*3/mm3      Basophils, Absolute 0.02 10*3/mm3      Immature Grans, Absolute 0.03 10*3/mm3      nRBC 0.0 /100 WBC     Comprehensive Metabolic Panel [[id number]]  (Abnormal) Collected: 06/28/24 1522    Specimen: Blood from Arm, Right Updated: 06/28/24 1614     Glucose 101 mg/dL      BUN 16 mg/dL      Creatinine 0.91 mg/dL      Sodium 140 mmol/L      Potassium 4.5 mmol/L      Comment: Slight  hemolysis detected by analyzer. Result may be falsely elevated.        Chloride 102 mmol/L      CO2 23.0 mmol/L      Calcium 10.9 mg/dL      Total Protein 8.3 g/dL      Albumin 4.8 g/dL      ALT (SGPT) 20 U/L      AST (SGOT) 23 U/L      Comment: Slight hemolysis detected by analyzer. Result may be falsely elevated.        Alkaline Phosphatase 99 U/L      Total Bilirubin 0.7 mg/dL      Globulin 3.5 gm/dL      A/G Ratio 1.4 g/dL      BUN/Creatinine Ratio 17.6     Anion Gap 15.0 mmol/L      eGFR 65.9 mL/min/1.73     Narrative:      GFR Normal >60  Chronic Kidney Disease <60  Kidney Failure <15    The GFR formula is only valid for adults with stable renal function between ages 18 and 70.    Amylase [[id number]]  (Normal) Collected: 06/28/24 1522    Specimen: Blood from Arm, Right Updated: 06/28/24 1605     Amylase 87 U/L     High Sensitivity Troponin T [[id number]]  (Normal) Collected: 06/28/24 1522    Specimen: Blood from Arm, Right Updated: 06/28/24 1605     HS Troponin T 11 ng/L     Narrative:      High Sensitive Troponin T Reference Range:  <14.0 ng/L- Negative Female for AMI  <22.0 ng/L- Negative Male for AMI  >=14 - Abnormal Female indicating possible myocardial injury.  >=22 - Abnormal Male indicating possible myocardial injury.   Clinicians would have to utilize clinical acumen, EKG, Troponin, and serial changes to determine if it is an Acute Myocardial Infarction or myocardial injury due to an underlying chronic condition.                  I reviewed the patient's new clinical results.    Assessment & Plan       Epigastric pain  -Labs are unremarkable for any evidence of pancreatitis.  Will order CT scan of abdomen and pelvis to evaluate for any evidence of gastritis, ulcer disease, perforation, bowel obstruction, etc.  Start PPI and sucralfate.  Consider EGD if symptoms do not improve.    Hypothyroidism-levothyroxine  Hyperlipidemia-statin  Essential hypertension-levothyroxine  Mood disorder duloxetine    SCDs  for DVT prophylaxis      I discussed the patient's findings and my recommendations with patient.     Taylor Saleh MD  06/28/24  17:49 EDT

## 2024-06-29 ENCOUNTER — INPATIENT HOSPITAL (AMBULATORY)
Dept: URBAN - METROPOLITAN AREA HOSPITAL 84 | Facility: HOSPITAL | Age: 74
End: 2024-06-29
Payer: COMMERCIAL

## 2024-06-29 DIAGNOSIS — R10.9 UNSPECIFIED ABDOMINAL PAIN: ICD-10-CM

## 2024-06-29 LAB — LIPASE SERPL-CCNC: 56 U/L (ref 13–60)

## 2024-06-29 PROCEDURE — 25810000003 SODIUM CHLORIDE 0.9 % SOLUTION: Performed by: INTERNAL MEDICINE

## 2024-06-29 PROCEDURE — 85025 COMPLETE CBC W/AUTO DIFF WBC: CPT

## 2024-06-29 PROCEDURE — 80053 COMPREHEN METABOLIC PANEL: CPT

## 2024-06-29 PROCEDURE — 83690 ASSAY OF LIPASE: CPT

## 2024-06-29 PROCEDURE — 84484 ASSAY OF TROPONIN QUANT: CPT | Performed by: INTERNAL MEDICINE

## 2024-06-29 PROCEDURE — 82150 ASSAY OF AMYLASE: CPT

## 2024-06-29 PROCEDURE — 99222 1ST HOSP IP/OBS MODERATE 55: CPT | Mod: FS

## 2024-06-29 RX ORDER — ACETAMINOPHEN 325 MG/1
650 TABLET ORAL EVERY 6 HOURS PRN
Status: DISCONTINUED | OUTPATIENT
Start: 2024-06-29 | End: 2024-06-30 | Stop reason: HOSPADM

## 2024-06-29 RX ORDER — ACETAMINOPHEN 325 MG/1
650 TABLET ORAL EVERY 6 HOURS PRN
Status: DISCONTINUED | OUTPATIENT
Start: 2024-06-29 | End: 2024-06-29

## 2024-06-29 RX ADMIN — LATANOPROST 1 DROP: 50 SOLUTION/ DROPS OPHTHALMIC at 20:02

## 2024-06-29 RX ADMIN — ACETAMINOPHEN 650 MG: 325 TABLET, FILM COATED ORAL at 13:25

## 2024-06-29 RX ADMIN — SUCRALFATE 1 G: 1 TABLET ORAL at 17:06

## 2024-06-29 RX ADMIN — ROSUVASTATIN 10 MG: 10 TABLET, FILM COATED ORAL at 20:01

## 2024-06-29 RX ADMIN — SODIUM CHLORIDE 100 ML/HR: 9 INJECTION, SOLUTION INTRAVENOUS at 12:59

## 2024-06-29 RX ADMIN — Medication 10 ML: at 20:01

## 2024-06-29 RX ADMIN — SUCRALFATE 1 G: 1 TABLET ORAL at 20:01

## 2024-06-29 RX ADMIN — SODIUM CHLORIDE 100 ML/HR: 9 INJECTION, SOLUTION INTRAVENOUS at 23:50

## 2024-06-29 RX ADMIN — PANTOPRAZOLE SODIUM 40 MG: 40 INJECTION, POWDER, FOR SOLUTION INTRAVENOUS at 08:12

## 2024-06-29 RX ADMIN — PANTOPRAZOLE SODIUM 40 MG: 40 INJECTION, POWDER, FOR SOLUTION INTRAVENOUS at 17:06

## 2024-06-29 RX ADMIN — METOPROLOL SUCCINATE 50 MG: 50 TABLET, EXTENDED RELEASE ORAL at 08:12

## 2024-06-29 RX ADMIN — Medication 10 ML: at 08:19

## 2024-06-29 RX ADMIN — SUCRALFATE 1 G: 1 TABLET ORAL at 12:16

## 2024-06-29 RX ADMIN — LEVOTHYROXINE SODIUM 100 MCG: 0.1 TABLET ORAL at 05:26

## 2024-06-29 RX ADMIN — DULOXETINE HYDROCHLORIDE 30 MG: 30 CAPSULE, DELAYED RELEASE ORAL at 08:12

## 2024-06-29 RX ADMIN — SODIUM CHLORIDE 100 ML/HR: 9 INJECTION, SOLUTION INTRAVENOUS at 03:06

## 2024-06-29 RX ADMIN — SUCRALFATE 1 G: 1 TABLET ORAL at 08:12

## 2024-06-29 NOTE — PLAN OF CARE
Problem: Adult Inpatient Plan of Care  Goal: Plan of Care Review  Outcome: Ongoing, Progressing   Discussed POC with pt at bedside, answered questions.     Problem: Adult Inpatient Plan of Care  Goal: Absence of Hospital-Acquired Illness or Injury  Intervention: Prevent Infection  Recent Flowsheet Documentation  Taken 6/29/2024 0800 by Clare Dinh, RN  Infection Prevention:   hand hygiene promoted   single patient room provided   Hand hygiene completed upon entering and exiting the room, encouraged pt to complete personal hygiene.    Problem: Adult Inpatient Plan of Care  Goal: Absence of Hospital-Acquired Illness or Injury  Intervention: Prevent and Manage VTE (Venous Thromboembolism) Risk  Recent Flowsheet Documentation  Taken 6/29/2024 0800 by Clare Dinh, RN  Activity Management:   ambulated in room   ambulated to bathroom   up in chair  VTE Prevention/Management: sequential compression devices off  Range of Motion: active ROM (range of motion) encouraged  Pt independent and mobilizing in room.     Goal Outcome Evaluation:      Progressing towards goal of safe dc home.

## 2024-06-29 NOTE — PLAN OF CARE
Goal Outcome Evaluation:   Pt rested well during the night.  CT scan completed.  Pt c/o nausea and prn Zofran given.  Will continue current plan of care.

## 2024-06-29 NOTE — NURSING NOTE
Received report from NOC, RN. Pt had an uneventful night, safety measures in place, care assumed.

## 2024-06-29 NOTE — CONSULTS
GI CONSULT  NOTE:    Referring Provider:  Dr Saleh    Chief complaint: abd pain    Subjective .     History of present illness: Katty Ramirez is a 74 y.o. female with history of Clement's esophagus, duodenal ulcer in 2023, CKD, colon resection due to diverticulitis in 2016 presents to the hospital with epigastric pain.  January 2023, she had a CT showing duodenitis and a possible contained perforation.  Patient reports she was in [place] for 2 weeks and began to feel sick about 2 days after she returned.  She complained of epigastric pain, nausea.  This did not improve so she presented to the ER.  She is currently feeling much better.  She has been tolerating a clear liquid diet.  No further nausea/vomiting.  Last bowel movement was yesterday.  Typically has daily bowel movements.  Denies rectal bleeding or melena.  No recent constipation or diarrhea.    Endo History:  2/2023 EGD (dresser)-gastritis, negative biopsy for H. Pylori  7/2022 colonoscopy-moderate diverticulosis, grade 2 internal hemorrhoids, TA polyp, lipoma-recall 2027    Past Medical History:  Past Medical History:   Diagnosis Date    Bunion     Callus     Chronic kidney disease     Difficulty walking I have moderate PAD    Disease of thyroid gland     Diverticulitis     Exertional dyspnea 08/06/2021    Heart murmur     History of transfusion Back in 1969    Hyperlipidemia     Hypertension     Ingrown toenail     Plantar fasciitis about 12 years ago    Type 2 diabetes mellitus     Uncontrolled type 2 diabetes mellitus with hyperglycemia 07/30/2019    Unstable angina 08/06/2021       Past Surgical History:  Past Surgical History:   Procedure Laterality Date    CARDIAC CATHETERIZATION N/A 08/12/2021    Procedure: Left Heart Cath;  Surgeon: Eligio Abraham MD;  Location: [facility] INVASIVE LOCATION;  Service: Cardiology;  Laterality: N/A;    CATARACT EXTRACTION  01/01/2014    COLON RESECTION  01/01/2016    HERNIA REPAIR  01/01/2016    LEG  SURGERY  2023    Vascular on left leg       Social History:  Social History     Tobacco Use    Smoking status: Former     Current packs/day: 0.00     Average packs/day: 1 pack/day for 45.0 years (45.0 ttl pk-yrs)     Types: Cigarettes     Start date: 1967     Quit date: 2012     Years since quittin.0    Smokeless tobacco: Never    Tobacco comments:     quit several times for brief periods (pregnancies, etc.) Maybe more like 40 years   Vaping Use    Vaping status: Never Used   Substance Use Topics    Alcohol use: Yes     Comment: Not a teetotaler, but maybe 8-10 drinks a year    Drug use: Never       Family History:  Family History   Problem Relation Age of Onset    Stroke Mother     Cancer Father         Prostrate    Heart disease Brother     Cancer Brother         Pancreas    Cancer Maternal Grandmother         Colon    Heart disease Paternal Grandmother        Medications:  Facility-Administered Medications Prior to Admission   Medication Dose Route Frequency Provider Last Rate Last Admin    ondansetron ODT (ZOFRAN-ODT) disintegrating tablet 8 mg  8 mg Oral Q6H PRN Eligio Abraham MD         Medications Prior to Admission   Medication Sig Dispense Refill Last Dose    levothyroxine (SYNTHROID, LEVOTHROID) 100 MCG tablet TAKE 1 TABLET DAILY 90 tablet 2 2024    amLODIPine-benazepril (LOTREL) 10-20 MG per capsule        aspirin 81 MG EC tablet Take 1 tablet by mouth Daily.       Cholecalciferol (Vitamin D3) 50 MCG (2000) capsule TAKE ONE CAPSULE BY MOUTH DAILY 90 capsule 3     clopidogrel (PLAVIX) 75 MG tablet Take 1 tablet by mouth Daily.       DULoxetine (CYMBALTA) 60 MG capsule Take 1 capsule by mouth Daily.       Lasix 20 MG tablet Take 1 tablet by mouth Daily.       latanoprost (XALATAN) 0.005 % ophthalmic solution Administer 1 drop to both eyes Every Night.       metoprolol succinate XL (TOPROL-XL) 50 MG 24 hr tablet        multivitamin with minerals (Centrum Women) tablet  tablet Take  by mouth Daily.       omeprazole (priLOSEC) 40 MG capsule        rosuvastatin (CRESTOR) 10 MG tablet TAKE 1 TABLET BY MOUTH DAILY 90 tablet 2     Semaglutide, 1 MG/DOSE, (Ozempic, 1 MG/DOSE,) 4 MG/3ML solution pen-injector Inject 1 mg under the skin into the appropriate area as directed 1 (One) Time Per Week. 3 mL 6        Scheduled Meds:diatrizoate meglumine-sodium, 30 mL, Oral, Once  DULoxetine, 30 mg, Oral, Daily  latanoprost, 1 drop, Both Eyes, Nightly  levothyroxine, 100 mcg, Oral, Q AM  metoprolol succinate XL, 50 mg, Oral, Q24H  pantoprazole, 40 mg, Intravenous, BID AC  rosuvastatin, 10 mg, Oral, Nightly  sodium chloride, 10 mL, Intravenous, Q12H  sucralfate, 1 g, Oral, 4x Daily AC & at Bedtime      Continuous Infusions:sodium chloride, 100 mL/hr, Last Rate: 100 mL/hr (06/29/24 1259)      PRN Meds:.  acetaminophen    senna-docusate sodium **AND** polyethylene glycol **AND** bisacodyl **AND** bisacodyl    Calcium Replacement - Follow Nurse / BPA Driven Protocol    Magnesium Standard Dose Replacement - Follow Nurse / BPA Driven Protocol    Morphine    nitroglycerin    ondansetron    Phosphorus Replacement - Follow Nurse / BPA Driven Protocol    Potassium Replacement - Follow Nurse / BPA Driven Protocol    sodium chloride    sodium chloride    ALLERGIES:  Amoxicillin-pot clavulanate, Ciprofloxacin, Amoxicillin, Clavulanic acid, and Flagyl [metronidazole]    ROS:  The following systems were reviewed and negative;   Constitution:  No fevers, chills, no unintentional weight loss  Skin: no rash, no jaundice  Eyes:  No blurry vision, no eye pain  HENT:  No change in hearing or smell  Resp:  No dyspnea or cough  CV:  No chest pain or palpitations  :  No dysuria, hematuria  Musculoskeletal:  No leg cramps or arthralgias  Neuro:  No tremor, no numbness  Psych:  No depression or confusion    Objective     Vital Signs:   Vitals:    06/29/24 0050 06/29/24 0353 06/29/24 0700 06/29/24 1332   BP: 138/70 134/67  "126/69 125/59   BP Location: Left arm Left arm Left arm Left arm   Patient Position: Lying Lying Lying Sitting   Pulse: 78 70 67 69   Resp: 18 12 17 15   Temp: 98 °F (36.7 °C) 97.7 °F (36.5 °C) 97.8 °F (36.6 °C) 98 °F (36.7 °C)   TempSrc: Temporal Temporal Oral Oral   SpO2: 98% 96% 96% 96%   Weight:       Height:           Physical Exam:       General Appearance:    Awake and alert, in no acute distress   Head:    Normocephalic, without obvious abnormality, atraumatic   Throat:   No oral lesions, no thrush, oral mucosa moist   Lungs:     Respirations regular, even and unlabored   Chest Wall:    No abnormalities observed   Abdomen:     Soft, non-tender, no rebound or guarding, non-distended, no hepatosplenomegaly   Rectal:     Deferred   Extremities:   Moves all extremities, no edema, no cyanosis   Pulses:   Pulses palpable and equal bilaterally   Skin:   No rash, no jaundice, normal palpation   Lymph nodes:   No cervical, supraclavicular or submandibular palpable adenopathy   Neurologic:   Cranial nerves 2 - 12 grossly intact, no asterixis       Results Review:   I reviewed the patient's labs and imaging.  CBC    Results from last 7 days   Lab Units 06/28/24  1522   WBC 10*3/mm3 8.49   HEMOGLOBIN g/dL 15.2   PLATELETS 10*3/mm3 264     CMP   Results from last 7 days   Lab Units 06/28/24  1522   SODIUM mmol/L 140   POTASSIUM mmol/L 4.5   CHLORIDE mmol/L 102   CO2 mmol/L 23.0   BUN mg/dL 16   CREATININE mg/dL 0.91   GLUCOSE mg/dL 101*   ALBUMIN g/dL 4.8   BILIRUBIN mg/dL 0.7   ALK PHOS U/L 99   AST (SGOT) U/L 23   ALT (SGPT) U/L 20   AMYLASE U/L 87   LIPASE U/L 56     Cr Clearance Estimated Creatinine Clearance: 56.5 mL/min (by C-G formula based on SCr of 0.91 mg/dL).  Coag     HbA1C   Lab Results   Component Value Date    HGBA1C 7.40 (H) 01/25/2024    HGBA1C 7.40 (H) 09/26/2023    HGBA1C 6.5 (H) 10/18/2022     Blood Glucose No results found for: \"POCGLU\"  Infection     UA      Imaging Results (Last 72 Hours)       " Procedure Component Value Units Date/Time    CT Abdomen Pelvis With Contrast [[id number]] Collected: 06/28/24 2126     Updated: 06/28/24 2142    Narrative:      CT ABDOMEN PELVIS W CONTRAST    Date of Exam: 6/28/2024 8:50 PM EDT    Indication: Epigastric pain, previous peptic ulcer disease.    Comparison: 1/28/2023    Technique: Axial CT images were obtained of the abdomen and pelvis following the uneventful intravenous administration of iodinated contrast. Sagittal and coronal reconstructions were performed.  Automated exposure control and iterative reconstruction   methods were used.    FINDINGS:    Lung bases: Left lower lobe calcified granuloma    Liver:No masses. No intrahepatic biliary ductal dilatation.    Spleen: Calcified granuloma    Pancreas:No pancreatic masses. No evidence of pancreatitis.    Gallbladder and common bile duct:No evidence of cholelithiasis. No evidence of cholecystitis.    Adrenal glands: 4.9 cm x 3.4 cm right adrenal mass stable compared with 1/28/2023    Kidneys and ureters:No kidney stones. No renal masses.No calculi present within the ureters. Normal caliber ureters.    Urinary bladder:No urinary bladder wall thickening. No bladder masses.    Small bowel:Normal caliber small bowel. No definite ulceration of the duodenum or stomach. There is a noninflamed duodenal diverticulum.    Large bowel: Postsurgical changes involving the sigmoid colon. Colonic diverticulosis without evidence of diverticulitis.    Appendix: Normal    GENITOURINARY: Normal appearance of the uterus and adnexa.    Ascites or pneumoperitoneum:None.    Adenopathy:None present    Osseous structures: Degenerative changes of the lumbar spine.    Other findings: None      Impression:      No acute pathology is demonstrated.          Electronically Signed: Ilan Stone MD    6/28/2024 9:40 PM EDT    Workstation ID: [id number]            ASSESSMENT AND PLAN:    74-year-old female presents to the hospital with epigastric pain.   She has a history of a duodenal ulcer with possible contained perforation in 2023.    -Epigastric pain  -Nausea/vomiting  -History of duodenal ulcer with perforation  -hx of nissen fundoplication  -Clement's esophagus without dysplasia  -History of colon resection due to diverticulitis  -CKD    Plan  Patient is currently feeling much better and is tolerating a clear liquid diet.  LFTs are normal, amylase, lipase normal.  CT with no acute abnormality.  Advance diet and monitor for worsening symptoms.  Will make n.p.o. after midnight and plan for EGD if symptoms return.  Continue to trend labs.  Continue PPI.  Continue supportive care.    I discussed the patients findings and my recommendations with the patient.    We appreciate the referral    Electronically signed by MARYAM Stallings, 06/29/24, 2:47 PM EDT.

## 2024-06-29 NOTE — PROGRESS NOTES
LOS: 0 days   Patient Care Team:  Jose Brenner MD as PCP - General  Jose Brenner MD as PCP - Family Medicine  Eligio Abraham MD as Consulting Physician (Cardiology)  Preeti Herrera MD as Consulting Physician (Endocrinology)    Subjective:  Seen and examined at bedside.   Feeling slightly better- continues to have epigastric pain.  Patient states if feels like it did when she had ulcer.    Pt is agreeable to continue clear liquids and IVF and GI consult.       Review of Systems:   Review of Systems   Constitutional:  Negative for activity change, fatigue and fever.   HENT:  Negative for trouble swallowing.    Eyes:  Negative for visual disturbance.   Respiratory: Negative.     Cardiovascular: Negative.    Gastrointestinal:  Positive for abdominal pain. Negative for abdominal distention, diarrhea, nausea and vomiting.   Endocrine: Negative.    Genitourinary:  Negative for dysuria.   Musculoskeletal: Negative.    Skin: Negative.    Neurological: Negative.    Hematological: Negative.    Psychiatric/Behavioral: Negative.             Vital Signs  Temp:  [97.7 °F (36.5 °C)-98.6 °F (37 °C)] 97.8 °F (36.6 °C)  Heart Rate:  [67-86] 67  Resp:  [12-22] 17  BP: (126-160)/(67-81) 126/69    Physical Exam:  Physical Exam  Constitutional:       General: She is not in acute distress.     Appearance: She is not ill-appearing.   HENT:      Head: Normocephalic and atraumatic.      Ears:      Comments: No hearing impairment     Nose: Nose normal.      Mouth/Throat:      Mouth: Mucous membranes are moist.      Pharynx: Oropharynx is clear.   Eyes:      General: No scleral icterus.     Conjunctiva/sclera: Conjunctivae normal.   Cardiovascular:      Rate and Rhythm: Normal rate and regular rhythm.      Pulses: Normal pulses.      Heart sounds: Normal heart sounds. No murmur heard.  Pulmonary:      Effort: Pulmonary effort is normal. No respiratory distress.      Breath sounds: Normal breath sounds. No wheezing,  rhonchi or rales.   Abdominal:      General: Bowel sounds are normal. There is no distension.      Palpations: Abdomen is soft.      Tenderness: There is abdominal tenderness. There is no guarding or rebound.      Comments: Normoactive bowel sounds x4  Pain with palpation of epigastric area and slight pain to LUQ  No palpable mass  ABD soft   Musculoskeletal:         General: Normal range of motion.      Cervical back: Normal range of motion and neck supple. No rigidity.   Skin:     General: Skin is warm and dry.      Capillary Refill: Capillary refill takes less than 2 seconds.   Neurological:      General: No focal deficit present.      Mental Status: She is alert and oriented to person, place, and time.      Cranial Nerves: No cranial nerve deficit.   Psychiatric:         Mood and Affect: Mood normal.         Behavior: Behavior normal.          Radiology:  CT Abdomen Pelvis With Contrast    Result Date: 6/28/2024  No acute pathology is demonstrated. Electronically Signed: Ilan Stone MD  6/28/2024 9:40 PM EDT  Workstation ID: [id number]        Results Review:     I reviewed the patient's new clinical results.  I reviewed the patient's new imaging results and agree with the interpretation.    Medication Review:   Scheduled Meds:diatrizoate meglumine-sodium, 30 mL, Oral, Once  DULoxetine, 30 mg, Oral, Daily  latanoprost, 1 drop, Both Eyes, Nightly  levothyroxine, 100 mcg, Oral, Q AM  metoprolol succinate XL, 50 mg, Oral, Q24H  pantoprazole, 40 mg, Intravenous, BID AC  rosuvastatin, 10 mg, Oral, Nightly  sodium chloride, 10 mL, Intravenous, Q12H  sucralfate, 1 g, Oral, 4x Daily AC & at Bedtime      Continuous Infusions:sodium chloride, 100 mL/hr, Last Rate: 100 mL/hr (06/29/24 0306)      PRN Meds:.  senna-docusate sodium **AND** polyethylene glycol **AND** bisacodyl **AND** bisacodyl    Calcium Replacement - Follow Nurse / BPA Driven Protocol    Magnesium Standard Dose Replacement - Follow Nurse / BPA Driven  "Protocol    Morphine    nitroglycerin    ondansetron    Phosphorus Replacement - Follow Nurse / BPA Driven Protocol    Potassium Replacement - Follow Nurse / BPA Driven Protocol    sodium chloride    sodium chloride    Labs:    CBC    Results from last 7 days   Lab Units 06/28/24  1522   WBC 10*3/mm3 8.49   HEMOGLOBIN g/dL 15.2   PLATELETS 10*3/mm3 264     BMP   Results from last 7 days   Lab Units 06/28/24  1522   SODIUM mmol/L 140   POTASSIUM mmol/L 4.5   CHLORIDE mmol/L 102   CO2 mmol/L 23.0   BUN mg/dL 16   CREATININE mg/dL 0.91   GLUCOSE mg/dL 101*     Cr Clearance Estimated Creatinine Clearance: 56.5 mL/min (by C-G formula based on SCr of 0.91 mg/dL).  Coag     HbA1C   Lab Results   Component Value Date    HGBA1C 7.40 (H) 01/25/2024    HGBA1C 7.40 (H) 09/26/2023    HGBA1C 6.5 (H) 10/18/2022     Blood Glucose No results found for: \"POCGLU\"  Infection     CMP   Results from last 7 days   Lab Units 06/28/24  1522   SODIUM mmol/L 140   POTASSIUM mmol/L 4.5   CHLORIDE mmol/L 102   CO2 mmol/L 23.0   BUN mg/dL 16   CREATININE mg/dL 0.91   GLUCOSE mg/dL 101*   ALBUMIN g/dL 4.8   BILIRUBIN mg/dL 0.7   ALK PHOS U/L 99   AST (SGOT) U/L 23   ALT (SGPT) U/L 20   AMYLASE U/L 87     UA      Radiology(recent) CT Abdomen Pelvis With Contrast    Result Date: 6/28/2024  No acute pathology is demonstrated. Electronically Signed: Ilan Stone MD  6/28/2024 9:40 PM EDT  Workstation ID: [id number]        Assessment:  Epigastric Pain  History of Gastric Ulcer  Hypothyroidism  HLD  HTN  Mood Disorder    Plan:  GI consulted due to patient history of gastric ulcer and continues to have epigastric pain.   Anticipate EGD- GI may want to do as outpatient- pending their consultation.   Continue PPI and carafate  Continue IVF and clear liquid diet to allow for gut rest  Labs and vitals stable  CT ABD/Pelvis: negative for acute findings.       GI Prophylaxis:  PPI  VTE Prophylaxis:  SCDs      DC PLAN:  Routine to home.  Consider discharge to " home on Sunday 6/30/2024 pending GI consult.         Chelsea Mccloud, APRN  06/29/24  11:34 EDT

## 2024-06-30 ENCOUNTER — ANESTHESIA EVENT (OUTPATIENT)
Dept: GASTROENTEROLOGY | Facility: HOSPITAL | Age: 74
End: 2024-06-30
Payer: MEDICARE

## 2024-06-30 ENCOUNTER — ANESTHESIA (OUTPATIENT)
Dept: GASTROENTEROLOGY | Facility: HOSPITAL | Age: 74
End: 2024-06-30
Payer: MEDICARE

## 2024-06-30 ENCOUNTER — READMISSION MANAGEMENT (OUTPATIENT)
Dept: CALL CENTER | Facility: HOSPITAL | Age: 74
End: 2024-06-30
Payer: MEDICARE

## 2024-06-30 ENCOUNTER — INPATIENT HOSPITAL (AMBULATORY)
Dept: URBAN - METROPOLITAN AREA HOSPITAL 84 | Facility: HOSPITAL | Age: 74
End: 2024-06-30

## 2024-06-30 VITALS
HEIGHT: 63 IN | WEIGHT: 196 LBS | SYSTOLIC BLOOD PRESSURE: 113 MMHG | DIASTOLIC BLOOD PRESSURE: 77 MMHG | HEART RATE: 73 BPM | BODY MASS INDEX: 34.73 KG/M2 | RESPIRATION RATE: 13 BRPM | OXYGEN SATURATION: 97 % | TEMPERATURE: 98 F

## 2024-06-30 DIAGNOSIS — K31.89 OTHER DISEASES OF STOMACH AND DUODENUM: ICD-10-CM

## 2024-06-30 DIAGNOSIS — R10.13 EPIGASTRIC PAIN: ICD-10-CM

## 2024-06-30 LAB
ALBUMIN SERPL-MCNC: 3.5 G/DL (ref 3.5–5.2)
ALBUMIN/GLOB SERPL: 1.5 G/DL
ALP SERPL-CCNC: 66 U/L (ref 39–117)
ALT SERPL W P-5'-P-CCNC: 13 U/L (ref 1–33)
AMYLASE SERPL-CCNC: 69 U/L (ref 28–100)
ANION GAP SERPL CALCULATED.3IONS-SCNC: 8.9 MMOL/L (ref 5–15)
AST SERPL-CCNC: 15 U/L (ref 1–32)
BASOPHILS # BLD AUTO: 0.04 10*3/MM3 (ref 0–0.2)
BASOPHILS NFR BLD AUTO: 0.8 % (ref 0–1.5)
BILIRUB SERPL-MCNC: 0.4 MG/DL (ref 0–1.2)
BUN SERPL-MCNC: 16 MG/DL (ref 8–23)
BUN/CREAT SERPL: 13.7 (ref 7–25)
CALCIUM SPEC-SCNC: 9.3 MG/DL (ref 8.6–10.5)
CHLORIDE SERPL-SCNC: 109 MMOL/L (ref 98–107)
CO2 SERPL-SCNC: 24.1 MMOL/L (ref 22–29)
CREAT SERPL-MCNC: 1.17 MG/DL (ref 0.57–1)
DEPRECATED RDW RBC AUTO: 47.7 FL (ref 37–54)
EGFRCR SERPLBLD CKD-EPI 2021: 48.8 ML/MIN/1.73
EOSINOPHIL # BLD AUTO: 0.1 10*3/MM3 (ref 0–0.4)
EOSINOPHIL NFR BLD AUTO: 2.1 % (ref 0.3–6.2)
ERYTHROCYTE [DISTWIDTH] IN BLOOD BY AUTOMATED COUNT: 13.2 % (ref 12.3–15.4)
GEN 5 2HR TROPONIN T REFLEX: 9 NG/L
GLOBULIN UR ELPH-MCNC: 2.3 GM/DL
GLUCOSE BLDC GLUCOMTR-MCNC: 95 MG/DL (ref 70–105)
GLUCOSE SERPL-MCNC: 99 MG/DL (ref 65–99)
HCT VFR BLD AUTO: 36.6 % (ref 34–46.6)
HGB BLD-MCNC: 11.5 G/DL (ref 12–15.9)
IMM GRANULOCYTES # BLD AUTO: 0.01 10*3/MM3 (ref 0–0.05)
IMM GRANULOCYTES NFR BLD AUTO: 0.2 % (ref 0–0.5)
LIPASE SERPL-CCNC: 52 U/L (ref 13–60)
LYMPHOCYTES # BLD AUTO: 1.52 10*3/MM3 (ref 0.7–3.1)
LYMPHOCYTES NFR BLD AUTO: 31.5 % (ref 19.6–45.3)
MCH RBC QN AUTO: 30.5 PG (ref 26.6–33)
MCHC RBC AUTO-ENTMCNC: 31.4 G/DL (ref 31.5–35.7)
MCV RBC AUTO: 97.1 FL (ref 79–97)
MONOCYTES # BLD AUTO: 0.49 10*3/MM3 (ref 0.1–0.9)
MONOCYTES NFR BLD AUTO: 10.2 % (ref 5–12)
NEUTROPHILS NFR BLD AUTO: 2.66 10*3/MM3 (ref 1.7–7)
NEUTROPHILS NFR BLD AUTO: 55.2 % (ref 42.7–76)
NRBC BLD AUTO-RTO: 0 /100 WBC (ref 0–0.2)
PLATELET # BLD AUTO: 212 10*3/MM3 (ref 140–450)
PMV BLD AUTO: 9.2 FL (ref 6–12)
POTASSIUM SERPL-SCNC: 4.2 MMOL/L (ref 3.5–5.2)
PROT SERPL-MCNC: 5.8 G/DL (ref 6–8.5)
RBC # BLD AUTO: 3.77 10*6/MM3 (ref 3.77–5.28)
SODIUM SERPL-SCNC: 142 MMOL/L (ref 136–145)
TROPONIN T DELTA: NORMAL
WBC NRBC COR # BLD AUTO: 4.82 10*3/MM3 (ref 3.4–10.8)

## 2024-06-30 PROCEDURE — 25010000002 PROPOFOL 200 MG/20ML EMULSION: Performed by: ANESTHESIOLOGY

## 2024-06-30 PROCEDURE — 88305 TISSUE EXAM BY PATHOLOGIST: CPT | Performed by: INTERNAL MEDICINE

## 2024-06-30 PROCEDURE — 25810000003 SODIUM CHLORIDE 0.9 % SOLUTION: Performed by: INTERNAL MEDICINE

## 2024-06-30 PROCEDURE — 43239 EGD BIOPSY SINGLE/MULTIPLE: CPT | Performed by: INTERNAL MEDICINE

## 2024-06-30 PROCEDURE — 0DB68ZX EXCISION OF STOMACH, VIA NATURAL OR ARTIFICIAL OPENING ENDOSCOPIC, DIAGNOSTIC: ICD-10-PCS | Performed by: INTERNAL MEDICINE

## 2024-06-30 PROCEDURE — 25810000003 SODIUM CHLORIDE 0.9 % SOLUTION: Performed by: ANESTHESIOLOGY

## 2024-06-30 PROCEDURE — 82948 REAGENT STRIP/BLOOD GLUCOSE: CPT

## 2024-06-30 PROCEDURE — 25010000002 DEXAMETHASONE PER 1 MG: Performed by: ANESTHESIOLOGY

## 2024-06-30 RX ORDER — LIDOCAINE HYDROCHLORIDE 20 MG/ML
INJECTION, SOLUTION INFILTRATION; PERINEURAL AS NEEDED
Status: DISCONTINUED | OUTPATIENT
Start: 2024-06-30 | End: 2024-06-30 | Stop reason: SURG

## 2024-06-30 RX ORDER — SUCRALFATE 1 G/1
1 TABLET ORAL
Qty: 120 TABLET | Refills: 1 | Status: SHIPPED | OUTPATIENT
Start: 2024-06-30

## 2024-06-30 RX ORDER — PROPOFOL 10 MG/ML
INJECTION, EMULSION INTRAVENOUS AS NEEDED
Status: DISCONTINUED | OUTPATIENT
Start: 2024-06-30 | End: 2024-06-30 | Stop reason: SURG

## 2024-06-30 RX ORDER — DULOXETIN HYDROCHLORIDE 30 MG/1
30 CAPSULE, DELAYED RELEASE ORAL DAILY
Qty: 90 CAPSULE | Refills: 1 | Status: SHIPPED | OUTPATIENT
Start: 2024-07-01

## 2024-06-30 RX ORDER — SODIUM CHLORIDE 9 MG/ML
INJECTION, SOLUTION INTRAVENOUS CONTINUOUS PRN
Status: DISCONTINUED | OUTPATIENT
Start: 2024-06-30 | End: 2024-06-30 | Stop reason: SURG

## 2024-06-30 RX ORDER — DEXAMETHASONE SODIUM PHOSPHATE 4 MG/ML
INJECTION, SOLUTION INTRA-ARTICULAR; INTRALESIONAL; INTRAMUSCULAR; INTRAVENOUS; SOFT TISSUE AS NEEDED
Status: DISCONTINUED | OUTPATIENT
Start: 2024-06-30 | End: 2024-06-30 | Stop reason: SURG

## 2024-06-30 RX ADMIN — PROPOFOL 25 MG: 10 INJECTION, EMULSION INTRAVENOUS at 14:33

## 2024-06-30 RX ADMIN — Medication 10 ML: at 08:34

## 2024-06-30 RX ADMIN — PROPOFOL 50 MG: 10 INJECTION, EMULSION INTRAVENOUS at 14:35

## 2024-06-30 RX ADMIN — LEVOTHYROXINE SODIUM 100 MCG: 0.1 TABLET ORAL at 05:10

## 2024-06-30 RX ADMIN — DULOXETINE HYDROCHLORIDE 30 MG: 30 CAPSULE, DELAYED RELEASE ORAL at 08:33

## 2024-06-30 RX ADMIN — SUCRALFATE 1 G: 1 TABLET ORAL at 08:33

## 2024-06-30 RX ADMIN — DEXAMETHASONE SODIUM PHOSPHATE 4 MG: 4 INJECTION, SOLUTION INTRAMUSCULAR; INTRAVENOUS at 14:31

## 2024-06-30 RX ADMIN — LIDOCAINE HYDROCHLORIDE 60 MG: 20 INJECTION, SOLUTION INFILTRATION; PERINEURAL at 14:31

## 2024-06-30 RX ADMIN — SODIUM CHLORIDE 100 ML/HR: 9 INJECTION, SOLUTION INTRAVENOUS at 11:20

## 2024-06-30 RX ADMIN — METOPROLOL SUCCINATE 50 MG: 50 TABLET, EXTENDED RELEASE ORAL at 08:34

## 2024-06-30 RX ADMIN — PANTOPRAZOLE SODIUM 40 MG: 40 INJECTION, POWDER, FOR SOLUTION INTRAVENOUS at 11:15

## 2024-06-30 RX ADMIN — PROPOFOL 50 MG: 10 INJECTION, EMULSION INTRAVENOUS at 14:31

## 2024-06-30 RX ADMIN — SODIUM CHLORIDE: 9 INJECTION, SOLUTION INTRAVENOUS at 14:31

## 2024-06-30 NOTE — NURSING NOTE
PIV removed, discharge education complete, questions answered. Pt discharged with belongings via transport.

## 2024-06-30 NOTE — CASE MANAGEMENT/SOCIAL WORK
Case Management Discharge Note      Final Note: home           Transportation Services  Private: Car    Final Discharge Disposition Code: 01 - home or self-care

## 2024-06-30 NOTE — OP NOTE
ESOPHAGOGASTRODUODENOSCOPY Procedure Report    Patient Name:  Katty Ramirez  YOB: 1950    Date of Surgery:  6/30/2024     Pre-Op Diagnosis:  Epigastric pain [R10.13]         Procedure/CPT® Codes:      Procedure(s):  ESOPHAGOGASTRODUODENOSCOPY with BIOPSY    Staff:  Surgeon(s):  Michelle Phillips MD      Anesthesia: Monitored Anesthesia Care    Description of Procedure:  A description of the procedure as well as risks, benefits and alternative methods were explained to the patient who voiced understanding and signed the corresponding consent form. A physical exam was performed and vital signs were monitored throughout the procedure.    An upper GI endoscope was placed into the mouth and proceeded through the esophagus, stomach and second portion of the duodenum without difficulty. The scope was then retroflexed and the fundus was visualized. The procedure was not difficult and there were no immediate complications.      Findings  Esophageal mucosa looks normal upper middle lower part of the esophagus except changes consistent with a fundoplication with a slightly slipped Nissen and loop Nissen was noticed.  No esophagitis was seen.  Gassy mucosa does show diffuse congestion erythema mucosal edema consistent with gastritis biopsy was performed body fundus and antrum area.  Duodenal mucosa looks normal.  Second part of duodenum.      Impression:  1.  Changes suggestive of Nissen fundoplication which has slightly slipped.  No evidence of esophagitis noticed.  2.  Changes suggestive of pan gastritis with markedly elevated gassy mucosa edema and erythema biopsy was performed.  3.  Normal duodenal mucosa    Recommendations:  Follow-up with the biopsy result.  Okay to discharge on PPI.  Follow the GI clinic as needed        Michelle Phillips MD     Date: 6/30/2024    Time: 14:54 EDT

## 2024-06-30 NOTE — NURSING NOTE
Received report from NOC, RN. Pt had an uneventful night and was able to tolerate dinner without complications. Pt is currently NPO per GI for possible EGD. Safety measures in place, care assumed.

## 2024-06-30 NOTE — ANESTHESIA POSTPROCEDURE EVALUATION
Patient: Katty Ramirez    Procedure Summary       Date: 06/30/24 Room / [facility]: [facility] ENDOSCOPY [facility]    Anesthesia Start: 1429 Anesthesia Stop: 1446    Procedure: ESOPHAGOGASTRODUODENOSCOPY with BIOPSY Diagnosis:       Epigastric pain      (Epigastric pain [R10.13])    Surgeons: Michelle Phillips MD Provider: Rah Reyes MD    Anesthesia Type: general ASA Status: 3            Anesthesia Type: general    Vitals  No vitals data found for the desired time range.          Post Anesthesia Care and Evaluation    Patient location during evaluation: PACU  Patient participation: complete - patient participated  Level of consciousness: awake  Pain scale: See nurse's notes for pain score.  Pain management: adequate    Airway patency: patent  Anesthetic complications: No anesthetic complications  PONV Status: none  Cardiovascular status: acceptable  Respiratory status: acceptable and spontaneous ventilation  Hydration status: acceptable    Comments: Patient seen and examined postoperatively; vital signs stable; SpO2 greater than or equal to 90%; cardiopulmonary status stable; nausea/vomiting adequately controlled; pain adequately controlled; no apparent anesthesia complications; patient discharged from anesthesia care when discharge criteria were met

## 2024-06-30 NOTE — ANESTHESIA PREPROCEDURE EVALUATION
Anesthesia Evaluation     Patient summary reviewed and Nursing notes reviewed   NPO Solid Status: > 8 hours  NPO Liquid Status: > 2 hours           Airway   Mallampati: III  TM distance: >3 FB  Neck ROM: full  Large neck circumference  Dental - normal exam     Pulmonary    (+) ,shortness of breath, decreased breath sounds  Cardiovascular - normal exam  Exercise tolerance: good (4-7 METS)    ECG reviewed    (+) hypertension, valvular problems/murmurs, hyperlipidemia      Neuro/Psych  (+) headaches, dizziness/light headedness, numbness  GI/Hepatic/Renal/Endo    (+) GERD well controlled, PUD, renal disease-, diabetes mellitus type 2 well controlled, thyroid problem     Musculoskeletal     Abdominal   (+) obese   Substance History      OB/GYN          Other   arthritis,     ROS/Med Hx Other: 3/23  Conclusion   Estimated left ventricular ejection fraction of 65 %   Left ventricular global and regional systolic function is normal                 Anesthesia Plan    ASA 3     general     intravenous induction     Anesthetic plan, risks, benefits, and alternatives have been provided, discussed and informed consent has been obtained with: patient.  Pre-procedure education provided  Plan discussed with CRNA.    CODE STATUS:    Code Status (Patient has no pulse and is not breathing): CPR (Attempt to Resuscitate)  Medical Interventions (Patient has pulse or is breathing): Full Support

## 2024-06-30 NOTE — DISCHARGE SUMMARY
Date of Discharge:  06/30/2024    Discharge Diagnosis:     Active Hospital Problems    Diagnosis  POA    **Epigastric pain [R10.13]  Yes    Diabetic peripheral neuropathy [E11.42]  Yes    Type 2 diabetes mellitus without complication, without long-term current use of insulin [E11.9]  Yes    Gastroesophageal reflux disease [K21.9]  Yes    Mixed hyperlipidemia [E78.2]  Yes    Essential hypertension [I10]  Yes    Acquired hypothyroidism [E03.9]  Yes    Primary open angle glaucoma (POAG) of both eyes [H40.1130]  Yes      Resolved Hospital Problems   No resolved problems to display.       Presenting Problem/History of Present Illness  Active Hospital Problems    Diagnosis  POA    **Epigastric pain [R10.13]  Yes    Diabetic peripheral neuropathy [E11.42]  Yes    Type 2 diabetes mellitus without complication, without long-term current use of insulin [E11.9]  Yes    Gastroesophageal reflux disease [K21.9]  Yes    Mixed hyperlipidemia [E78.2]  Yes    Essential hypertension [I10]  Yes    Acquired hypothyroidism [E03.9]  Yes    Primary open angle glaucoma (POAG) of both eyes [H40.1130]  Yes      Resolved Hospital Problems   No resolved problems to display.          Hospital Course  Patient is a 74 y.o. female who presented as a direct admission from our office due to severe epigastric pain.  She has recently returned from a  vacation and stated she did have about one alcoholic beverage daily while gone.  She began having severe epigastric pain that was made worse after eating.       She has a past medical history of duodenal ulcer, GERD, Clement's Esophagus, Nissen fundoplication, DM2, HTN, HLD.     She was started on PPI twice daily and Carafate four times per day and put on clear liquid diet to allow for gut rest.     Patient was noted to have some improvement but due to continued epigastric discomfort and history of ulcer GI was consulted.    On 6/30/2024 she had EGD that showed chages that suggest Nissen  fundoplication slightly slipped, no evidence of esophagitis, changes suggestive of pan gastritis, and normal duodenal mucosa.  Biopsies were obtained- pt to follow up with GI for results.     Patient was seen and examined this morning.  She reports that she is feeling better and agreeable to DC home after EGD.  GI is ok for discharge home today.  Patient is hemodynamically stable.      She is to fu in our office in 1-2 weeks.             Procedures Performed    Procedure(s):  ESOPHAGOGASTRODUODENOSCOPY with BIOPSY  -------------------  06/30 1427 Upper GI Endoscopy    Consults:   Consults       Date and Time Order Name Status Description    6/29/2024  9:54 AM Inpatient Gastroenterology Consult Completed             Pertinent Test Results:CT Abdomen Pelvis With Contrast    Result Date: 6/28/2024  No acute pathology is demonstrated. Electronically Signed: Ilan Stone MD  6/28/2024 9:40 PM EDT  Workstation ID: [id number]     Imaging Results (Last 7 Days)       Procedure Component Value Units Date/Time    CT Abdomen Pelvis With Contrast [[id number]] Collected: 06/28/24 2126     Updated: 06/28/24 2142    Narrative:      CT ABDOMEN PELVIS W CONTRAST    Date of Exam: 6/28/2024 8:50 PM EDT    Indication: Epigastric pain, previous peptic ulcer disease.    Comparison: 1/28/2023    Technique: Axial CT images were obtained of the abdomen and pelvis following the uneventful intravenous administration of iodinated contrast. Sagittal and coronal reconstructions were performed.  Automated exposure control and iterative reconstruction   methods were used.    FINDINGS:    Lung bases: Left lower lobe calcified granuloma    Liver:No masses. No intrahepatic biliary ductal dilatation.    Spleen: Calcified granuloma    Pancreas:No pancreatic masses. No evidence of pancreatitis.    Gallbladder and common bile duct:No evidence of cholelithiasis. No evidence of cholecystitis.    Adrenal glands: 4.9 cm x 3.4 cm right adrenal mass stable  compared with 1/28/2023    Kidneys and ureters:No kidney stones. No renal masses.No calculi present within the ureters. Normal caliber ureters.    Urinary bladder:No urinary bladder wall thickening. No bladder masses.    Small bowel:Normal caliber small bowel. No definite ulceration of the duodenum or stomach. There is a noninflamed duodenal diverticulum.    Large bowel: Postsurgical changes involving the sigmoid colon. Colonic diverticulosis without evidence of diverticulitis.    Appendix: Normal    GENITOURINARY: Normal appearance of the uterus and adnexa.    Ascites or pneumoperitoneum:None.    Adenopathy:None present    Osseous structures: Degenerative changes of the lumbar spine.    Other findings: None      Impression:      No acute pathology is demonstrated.          Electronically Signed: Ilan Stone MD    6/28/2024 9:40 PM EDT    Workstation ID: [id number]                Condition on Discharge:  Fair    Vital Signs  Temp:  [97.5 °F (36.4 °C)-98.2 °F (36.8 °C)] 98 °F (36.7 °C)  Heart Rate:  [64-77] 73  Resp:  [10-20] 13  BP: (105-155)/(44-77) 113/77    Physical Exam:     General Appearance:    Alert, cooperative, in no acute distress   Head:    Normocephalic, without obvious abnormality, atraumatic   Eyes:           Conjunctivae and sclerae normal, no   icterus, no pallor, corneas clear, PERRLA   Throat:   No oral lesions, no thrush, oral mucosa moist   Neck:   No adenopathy, supple, trachea midline, no thyromegaly, no   carotid bruit, no JVD   Lungs:     Clear to auscultation,respirations regular, even and                  unlabored    Heart:    Regular rhythm and normal rate, normal S1 and S2, no            murmur, no gallop, no rub, no click   Chest Wall:    No abnormalities observed   Abdomen:     Normal bowel sounds, no masses, no organomegaly, soft        non-tender, non-distended, no guarding, no rebound                tenderness   Rectal:     Deferred   Extremities:   Moves all extremities well, no  edema, no cyanosis, no             redness   Pulses:   Pulses palpable and equal bilaterally   Skin:   No bleeding, bruising or rash   Lymph nodes:   No palpable adenopathy   Neurologic:   Cranial nerves 2 - 12 grossly intact, sensation intact, DTR       present and equal bilaterally         Discharge Disposition  Home or Self Care    Discharge Medications     Discharge Medications        New Medications        Instructions Start Date   sucralfate 1 g tablet  Commonly known as: CARAFATE   1 g, Oral, 4 Times Daily Before Meals & Nightly             Changes to Medications        Instructions Start Date   DULoxetine 30 MG capsule  Commonly known as: CYMBALTA  What changed:   medication strength  how much to take   30 mg, Oral, Daily   Start Date: July 1, 2024            Continue These Medications        Instructions Start Date   amLODIPine-benazepril 10-20 MG per capsule  Commonly known as: LOTREL       aspirin 81 MG EC tablet   81 mg, Oral, Daily      Centrum Women tablet tablet  Generic drug: multivitamin with minerals   Oral, Daily      clopidogrel 75 MG tablet  Commonly known as: PLAVIX   75 mg, Oral, Daily      Lasix 20 MG tablet  Generic drug: furosemide   1 tablet, Oral, Daily      latanoprost 0.005 % ophthalmic solution  Commonly known as: XALATAN   1 drop, Both Eyes, Nightly      levothyroxine 100 MCG tablet  Commonly known as: SYNTHROID, LEVOTHROID   TAKE 1 TABLET DAILY      metoprolol succinate XL 50 MG 24 hr tablet  Commonly known as: TOPROL-XL       omeprazole 40 MG capsule  Commonly known as: priLOSEC       Ozempic (1 MG/DOSE) 4 MG/3ML solution pen-injector  Generic drug: Semaglutide (1 MG/DOSE)   1 mg, Subcutaneous, Weekly      rosuvastatin 10 MG tablet  Commonly known as: CRESTOR   10 mg, Oral, Daily      Vitamin D3 50 MCG (2000 UT) capsule   TAKE ONE CAPSULE BY MOUTH DAILY               Discharge Diet:   Diet Instructions       Diet: Diabetic Diets; Consistent Carbohydrate; Regular (IDDSI 7); Thin  (IDDSI 0)      Discharge Diet: Diabetic Diets    Diabetic Diet: Consistent Carbohydrate    Texture: Regular (IDDSI 7)    Fluid Consistency: Thin (IDDSI 0)            Activity at Discharge:   Activity Instructions       Activity as Tolerated              Follow-up Appointments  Future Appointments   Date Time Provider Department Center   8/8/2024  8:30 AM LAB  [facility] None   8/15/2024  1:45 PM Preeti Herrera MD [facility]   3/28/2025 11:00 AM Brandi Santiago APRN [facility]         Test Results Pending at Discharge  Pending Labs       Order Current Status    Tissue Pathology Exam Collected (06/30/24 1434)             MARYAM Beckwith  06/30/24  15:32 EDT

## 2024-06-30 NOTE — OUTREACH NOTE
Prep Survey      Flowsheet Row Responses   Denominational facility patient discharged from? [facility]   Is LACE score < 7 ? No   Eligibility Readm Mgmt   Discharge diagnosis Epigastric pain   Does the patient have one of the following disease processes/diagnoses(primary or secondary)? Other   Does the patient have Home health ordered? No   Is there a DME ordered? No   Medication alerts for this patient see AVS   Prep survey completed? Yes            Jeanna GONG - Registered Nurse

## 2024-06-30 NOTE — PLAN OF CARE
Problem: Adult Inpatient Plan of Care  Goal: Patient-Specific Goal (Individualized)  Outcome: Ongoing, Progressing  Flowsheets (Taken 6/30/2024 0688)  Patient-Specific Goals (Include Timeframe): Discharge today   Waiting for GI to discuss POC.    Problem: Pain Acute  Goal: Acceptable Pain Control and Functional Ability  Outcome: Ongoing, Progressing  Pt able to tolerate diet last night with no pain.     Goal Outcome Evaluation:      Progressing towards goals.

## 2024-06-30 NOTE — PLAN OF CARE
Goal Outcome Evaluation:   Pt rested well during the night.  Pt denies any pain or discomfort and no s/s of pain noted.  Will continue current plan of care.

## 2024-07-02 LAB
LAB AP CASE REPORT: NORMAL
PATH REPORT.FINAL DX SPEC: NORMAL
PATH REPORT.GROSS SPEC: NORMAL

## 2024-07-11 ENCOUNTER — READMISSION MANAGEMENT (OUTPATIENT)
Dept: CALL CENTER | Facility: HOSPITAL | Age: 74
End: 2024-07-11
Payer: MEDICARE

## 2024-07-11 NOTE — OUTREACH NOTE
Medical Week 2 Survey      Flowsheet Row Responses   Worship facility patient discharged from? [facility]   Does the patient have one of the following disease processes/diagnoses(primary or secondary)? Other   Week 2 attempt successful? No   Unsuccessful attempts Attempt 1            Gita GARZON - Registered Nurse

## 2024-07-15 ENCOUNTER — TELEPHONE (OUTPATIENT)
Dept: ENDOCRINOLOGY | Facility: CLINIC | Age: 74
End: 2024-07-15
Payer: MEDICARE

## 2024-07-15 ENCOUNTER — READMISSION MANAGEMENT (OUTPATIENT)
Dept: CALL CENTER | Facility: HOSPITAL | Age: 74
End: 2024-07-15
Payer: MEDICARE

## 2024-07-15 DIAGNOSIS — E03.9 ACQUIRED HYPOTHYROIDISM: Primary | ICD-10-CM

## 2024-07-15 NOTE — OUTREACH NOTE
Medical Week 2 Survey      Flowsheet Row Responses   Holiness facility patient discharged from? [facility]   Does the patient have one of the following disease processes/diagnoses(primary or secondary)? Other   Week 2 attempt successful? No   Unsuccessful attempts Attempt 2            Gita SO - Registered Nurse

## 2024-07-15 NOTE — TELEPHONE ENCOUNTER
Caller: Katty Ramirez    Relationship: Self    Best call back number: [phone]    Requested Prescriptions:   Requested Prescriptions     Pending Prescriptions Disp Refills    levothyroxine (SYNTHROID, LEVOTHROID) 100 MCG tablet 90 tablet 2     Sig: TAKE 1 TABLET DAILY        Pharmacy where request should be sent:  [facility]/pharmacy #[id number] - [facility] [place] [address] [phone] [facility] [phone] [facility]     Last office visit with prescribing clinician: 2/1/2024   Last telemedicine visit with prescribing clinician: Visit date not found   Next office visit with prescribing clinician: 8/15/2024     Additional details provided by patient: PATIENT HAS ABOUT A WEEK AND A HALF LEFT    Does the patient have less than a 3 day supply:  [] Yes  [x] No    Would you like a call back once the refill request has been completed: [] Yes [x] No    If the office needs to give you a call back, can they leave a voicemail: [] Yes [x] No    Danielle Allison Rep   07/15/24 08:36 EDT

## 2024-07-18 RX ORDER — LEVOTHYROXINE SODIUM 0.1 MG/1
TABLET ORAL
Qty: 90 TABLET | Refills: 2 | Status: SHIPPED | OUTPATIENT
Start: 2024-07-18

## 2024-07-24 ENCOUNTER — READMISSION MANAGEMENT (OUTPATIENT)
Dept: CALL CENTER | Facility: HOSPITAL | Age: 74
End: 2024-07-24
Payer: MEDICARE

## 2024-07-24 NOTE — OUTREACH NOTE
Medical Week 3 Survey      Flowsheet Row Responses   [facility] patient discharged from? [facility]   Does the patient have one of the following disease processes/diagnoses(primary or secondary)? Other   Week 3 attempt successful? Yes   Call start time 1829   Call end time 1833   Discharge diagnosis Epigastric pain   Person spoke with today (if not patient) and relationship patient   Meds reviewed with patient/caregiver? Yes   Does the patient have all medications ordered at discharge? Yes   Is the patient taking all medications as directed (includes completed medication regime)? Yes   Does the patient have a primary care provider?  Yes   Comments regarding PCP Has seen pcp since DC. however unable to get in with GI- they want patient to schedule appts online.   Has home health visited the patient within 72 hours of discharge? N/A   Psychosocial issues? No   What is the patient's perception of their health status since discharge? Improving   Is the patient/caregiver able to teach back signs and symptoms related to disease process for when to call PCP? Yes   Is the patient/caregiver able to teach back signs and symptoms related to disease process for when to call 911? Yes   Is the patient/caregiver able to teach back the hierarchy of who to call/visit for symptoms/problems? PCP, Specialist, Home health nurse, Urgent Care, ED, 911 Yes   Week 3 Call Completed? Yes   Graduated Yes   Wrap up additional comments Patient reports doing well. Working on trying to get in for fu with GI. Follows pcp closely no other concerns or questions noted.   Call end time 1833            Gita GARZON - Registered Nurse

## 2024-08-08 ENCOUNTER — LAB (OUTPATIENT)
Dept: LAB | Facility: HOSPITAL | Age: 74
End: 2024-08-08
Payer: MEDICARE

## 2024-08-08 DIAGNOSIS — E11.9 TYPE 2 DIABETES MELLITUS WITHOUT COMPLICATION, WITHOUT LONG-TERM CURRENT USE OF INSULIN: ICD-10-CM

## 2024-08-08 LAB
ALBUMIN SERPL-MCNC: 3.9 G/DL (ref 3.5–5.2)
ALBUMIN UR-MCNC: 5.9 MG/DL
ALBUMIN/GLOB SERPL: 1.5 G/DL
ALP SERPL-CCNC: 85 U/L (ref 39–117)
ALT SERPL W P-5'-P-CCNC: 20 U/L (ref 1–33)
ANION GAP SERPL CALCULATED.3IONS-SCNC: 6.5 MMOL/L (ref 5–15)
AST SERPL-CCNC: 19 U/L (ref 1–32)
BILIRUB SERPL-MCNC: 0.3 MG/DL (ref 0–1.2)
BUN SERPL-MCNC: 13 MG/DL (ref 8–23)
BUN/CREAT SERPL: 15.9 (ref 7–25)
CALCIUM SPEC-SCNC: 10 MG/DL (ref 8.6–10.5)
CHLORIDE SERPL-SCNC: 105 MMOL/L (ref 98–107)
CHOLEST SERPL-MCNC: 130 MG/DL (ref 0–200)
CO2 SERPL-SCNC: 27.5 MMOL/L (ref 22–29)
CREAT SERPL-MCNC: 0.82 MG/DL (ref 0.57–1)
CREAT UR-MCNC: 170.4 MG/DL
EGFRCR SERPLBLD CKD-EPI 2021: 74.7 ML/MIN/1.73
GLOBULIN UR ELPH-MCNC: 2.6 GM/DL
GLUCOSE SERPL-MCNC: 90 MG/DL (ref 65–99)
HBA1C MFR BLD: 7.03 % (ref 4.8–5.6)
HDLC SERPL-MCNC: 38 MG/DL (ref 40–60)
LDLC SERPL CALC-MCNC: 54 MG/DL (ref 0–100)
LDLC/HDLC SERPL: 1.17 {RATIO}
MICROALBUMIN/CREAT UR: 34.6 MG/G (ref 0–29)
POTASSIUM SERPL-SCNC: 4.7 MMOL/L (ref 3.5–5.2)
PROT SERPL-MCNC: 6.5 G/DL (ref 6–8.5)
SODIUM SERPL-SCNC: 139 MMOL/L (ref 136–145)
TRIGL SERPL-MCNC: 237 MG/DL (ref 0–150)
VLDLC SERPL-MCNC: 38 MG/DL (ref 5–40)

## 2024-08-08 PROCEDURE — 80053 COMPREHEN METABOLIC PANEL: CPT

## 2024-08-08 PROCEDURE — 83036 HEMOGLOBIN GLYCOSYLATED A1C: CPT

## 2024-08-08 PROCEDURE — 82570 ASSAY OF URINE CREATININE: CPT

## 2024-08-08 PROCEDURE — 82043 UR ALBUMIN QUANTITATIVE: CPT

## 2024-08-08 PROCEDURE — 36415 COLL VENOUS BLD VENIPUNCTURE: CPT

## 2024-08-08 PROCEDURE — 80061 LIPID PANEL: CPT

## 2024-08-13 RX ORDER — SEMAGLUTIDE 1.34 MG/ML
1 INJECTION, SOLUTION SUBCUTANEOUS WEEKLY
Qty: 3 ML | Refills: 5 | Status: SHIPPED | OUTPATIENT
Start: 2024-08-13

## 2024-08-15 ENCOUNTER — OFFICE VISIT (OUTPATIENT)
Dept: ENDOCRINOLOGY | Facility: CLINIC | Age: 74
End: 2024-08-15
Payer: MEDICARE

## 2024-08-15 VITALS
BODY MASS INDEX: 34.2 KG/M2 | HEIGHT: 63 IN | DIASTOLIC BLOOD PRESSURE: 70 MMHG | SYSTOLIC BLOOD PRESSURE: 125 MMHG | HEART RATE: 80 BPM | WEIGHT: 193 LBS | OXYGEN SATURATION: 92 %

## 2024-08-15 DIAGNOSIS — I10 ESSENTIAL HYPERTENSION: ICD-10-CM

## 2024-08-15 DIAGNOSIS — E03.9 ACQUIRED HYPOTHYROIDISM: ICD-10-CM

## 2024-08-15 DIAGNOSIS — E78.2 MIXED HYPERLIPIDEMIA: ICD-10-CM

## 2024-08-15 DIAGNOSIS — E11.42 DIABETIC PERIPHERAL NEUROPATHY: ICD-10-CM

## 2024-08-15 DIAGNOSIS — E11.65 TYPE 2 DIABETES MELLITUS WITH HYPERGLYCEMIA, WITHOUT LONG-TERM CURRENT USE OF INSULIN: Primary | ICD-10-CM

## 2024-08-15 LAB — GLUCOSE BLDC GLUCOMTR-MCNC: 94 MG/DL (ref 70–105)

## 2024-08-15 PROCEDURE — 82948 REAGENT STRIP/BLOOD GLUCOSE: CPT | Performed by: INTERNAL MEDICINE

## 2024-08-15 RX ORDER — METRONIDAZOLE 500 MG/1
TABLET ORAL
COMMUNITY
End: 2024-08-15

## 2024-08-15 RX ORDER — ONDANSETRON 4 MG/1
TABLET, FILM COATED ORAL
COMMUNITY
Start: 2024-07-15

## 2024-08-15 RX ORDER — FENOFIBRATE 160 MG/1
TABLET ORAL
COMMUNITY
End: 2024-08-15

## 2024-08-15 RX ORDER — SUMATRIPTAN 50 MG/1
TABLET, FILM COATED ORAL
COMMUNITY

## 2024-08-15 RX ORDER — IRBESARTAN 300 MG/1
TABLET ORAL
COMMUNITY
End: 2024-08-15

## 2024-08-15 RX ORDER — PRAVASTATIN SODIUM 40 MG
TABLET ORAL
COMMUNITY
End: 2024-08-15

## 2024-08-15 RX ORDER — FAMOTIDINE 20 MG/1
TABLET, FILM COATED ORAL
COMMUNITY
End: 2024-08-15

## 2024-08-15 RX ORDER — METFORMIN HYDROCHLORIDE 500 MG/1
TABLET, EXTENDED RELEASE ORAL
COMMUNITY
End: 2024-08-15

## 2024-08-15 RX ORDER — PANTOPRAZOLE SODIUM 40 MG/1
TABLET, DELAYED RELEASE ORAL
COMMUNITY

## 2024-08-15 RX ORDER — OXYBUTYNIN CHLORIDE 10 MG/1
TABLET, EXTENDED RELEASE ORAL
COMMUNITY
End: 2024-08-15

## 2024-08-15 RX ORDER — CILOSTAZOL 100 MG/1
TABLET ORAL
COMMUNITY

## 2024-08-15 RX ORDER — METOPROLOL TARTRATE 50 MG/1
TABLET, FILM COATED ORAL
COMMUNITY
End: 2024-08-15

## 2024-08-15 RX ORDER — BRINZOLAMIDE 10 MG/ML
SUSPENSION/ DROPS OPHTHALMIC DAILY
COMMUNITY

## 2024-08-15 RX ORDER — PITAVASTATIN CALCIUM 2.09 MG/1
TABLET, FILM COATED ORAL
COMMUNITY
End: 2024-08-15

## 2024-08-15 RX ORDER — METHYLPREDNISOLONE 4 MG/1
TABLET ORAL
COMMUNITY
Start: 2024-04-26 | End: 2024-08-15

## 2024-08-15 NOTE — PATIENT INSTRUCTIONS
Continue current management  Continue to work on diet and activity  If you develop any side effects from Ozempic like abdominal pain nausea or vomiting then please notify us.  Labs before follow-up.

## 2024-08-15 NOTE — PROGRESS NOTES
Endocrine Progress Note Outpatient     Patient Care Team:  Jose Brenner MD as PCP - General  Jose Brenner MD as PCP - Family Medicine  Eligio Abraham MD as Consulting Physician (Cardiology)  Preeti Herrera MD as Consulting Physician (Endocrinology)    Chief Complaint: Follow-up type 2 diabetes    HPI: 74-year-old female with history of type 2 diabetes, hypertension, hyperlipidemia and hypothyroidism is here for follow-up.    For type 2 diabetes: She is currently on Ozempic 1 mg subcu weekly.  She has been tolerating overall well however she had 1 episode where she was hospitalized with abdominal pain and some nausea.  Her primary care doctor Elidia question the possibility of Ozempic causing the pain but she felt like she had a similar pain when she had a gastric ulcer, she underwent workup and she was found to have gastritis but she is feeling fairly well.  She is not having these kind of symptoms on regular basis with Ozempic.    Hypertension: Well-controlled    Hypothyroidism: On levothyroxine supplementation    Hyperlipidemia: Currently on rosuvastatin.    She is status post stent placement in the left lower extremity on September 25, 2023.    Past Medical History:   Diagnosis Date    Bunion     Callus     Chronic kidney disease     Difficulty walking I have moderate PAD    Disease of thyroid gland     Diverticulitis     Exertional dyspnea 08/06/2021    Heart murmur     History of transfusion Back in 1969    Hyperlipidemia     Hypertension     Ingrown toenail     Plantar fasciitis about 12 years ago    Type 2 diabetes mellitus     Uncontrolled type 2 diabetes mellitus with hyperglycemia 07/30/2019    Unstable angina 08/06/2021       Social History     Socioeconomic History    Marital status:     Number of children: 3    Years of education: 12   Tobacco Use    Smoking status: Former     Current packs/day: 0.00     Average packs/day: 1 pack/day for 45.0 years (45.0 ttl pk-yrs)      Types: Cigarettes     Start date: 1967     Quit date: 2012     Years since quittin.2    Smokeless tobacco: Never    Tobacco comments:     quit several times for brief periods (pregnancies, etc.) Maybe more like 40 years   Vaping Use    Vaping status: Never Used   Substance and Sexual Activity    Alcohol use: Yes     Comment: Not a teetotaler, but maybe 8-10 drinks a year    Drug use: Never    Sexual activity: Not Currently     Partners: Male     Birth control/protection: None     Comment: why do you need to know?       Family History   Problem Relation Age of Onset    Stroke Mother     Cancer Father         Prostrate    Heart disease Brother     Cancer Brother         Pancreas    Cancer Maternal Grandmother         Colon    Heart disease Paternal Grandmother        Allergies   Allergen Reactions    Amoxicillin-Pot Clavulanate Diarrhea    Ciprofloxacin Myalgia    Beta Adrenergic Blockers Unknown - Low Severity    Amoxicillin Diarrhea    Clavulanic Acid Diarrhea    Flagyl [Metronidazole] Myalgia       ROS:   Constitutional:  Admit fatigue, tiredness.    Eyes:  Denies change in visual acuity   HENT:  Denies nasal congestion or sore throat   Respiratory: denies cough, admit shortness of breath.   Cardiovascular:  denies chest pain, edema   GI:  Denies abdominal pain, nausea, vomiting.   Musculoskeletal:  Denies back pain or joint pain   Integument:  Denies dry skin and rash   Neurologic:  Denies headache, focal weakness or sensory changes   Endocrine:  Denies polyuria or polydipsia   Psychiatric:  Denies depression or anxiety      Vitals:    08/15/24 1352   BP: 125/70   Pulse: 80   SpO2: 92%     BMI: 34.2    Physical Exam:  GEN: NAD, conversant, obese  CHEST: CTA  CVS: RRR  PSYCH: AOX3, appropriate mood, affect normal      Results Review:     I reviewed the patient's new clinical results.    Lab Results   Component Value Date    HGBA1C 7.03 (H) 2024    HGBA1C 7.40 (H) 2024    HGBA1C 7.40 (H)  09/26/2023      Lab Results   Component Value Date    GLUCOSE 90 08/08/2024    BUN 13 08/08/2024    CREATININE 0.82 08/08/2024    EGFRIFNONA 47 (L) 02/22/2022    BCR 15.9 08/08/2024    K 4.7 08/08/2024    CO2 27.5 08/08/2024    CALCIUM 10.0 08/08/2024    ALBUMIN 3.9 08/08/2024    LABIL2 2.1 (H) 06/01/2019    AST 19 08/08/2024    ALT 20 08/08/2024    CHOL 130 08/08/2024    TRIG 237 (H) 08/08/2024    LDL 54 08/08/2024    HDL 38 (L) 08/08/2024     Lab Results   Component Value Date    TSH 2.450 01/25/2024    FREET4 1.30 01/25/2024         Medication Review: Reviewed.       Current Outpatient Medications:     amLODIPine-benazepril (LOTREL) 10-20 MG per capsule, , Disp: , Rfl:     aspirin 81 MG EC tablet, Take 1 tablet by mouth Daily., Disp: , Rfl:     brinzolamide (Azopt) 1 % ophthalmic suspension, Daily., Disp: , Rfl:     Cholecalciferol (Vitamin D3) 50 MCG (2000 UT) capsule, TAKE ONE CAPSULE BY MOUTH DAILY, Disp: 90 capsule, Rfl: 3    clopidogrel (PLAVIX) 75 MG tablet, Take 1 tablet by mouth Daily., Disp: , Rfl:     DULoxetine (CYMBALTA) 30 MG capsule, Take 1 capsule by mouth Daily., Disp: 90 capsule, Rfl: 1    latanoprost (XALATAN) 0.005 % ophthalmic solution, Administer 1 drop to both eyes Every Night., Disp: , Rfl:     levothyroxine (SYNTHROID, LEVOTHROID) 100 MCG tablet, TAKE 1 TABLET DAILY, Disp: 90 tablet, Rfl: 2    metoprolol succinate XL (TOPROL-XL) 50 MG 24 hr tablet, , Disp: , Rfl:     multivitamin with minerals (Centrum Women) tablet tablet, Take  by mouth Daily., Disp: , Rfl:     omeprazole (priLOSEC) 40 MG capsule, , Disp: , Rfl:     ondansetron (ZOFRAN) 4 MG tablet, , Disp: , Rfl:     Ozempic, 1 MG/DOSE, 4 MG/3ML solution pen-injector, INJECT 1 MG UNDER THE SKIN INTO THE APPROPRIATE AREA AS DIRECTED 1 (ONE) TIME PER WEEK., Disp: 3 mL, Rfl: 5    rosuvastatin (CRESTOR) 10 MG tablet, TAKE 1 TABLET BY MOUTH DAILY, Disp: 90 tablet, Rfl: 2    SUMAtriptan (IMITREX) 50 MG tablet, 0, Disp: , Rfl:     cilostazol  (PLETAL) 100 MG tablet, 180 (Patient not taking: Reported on 8/15/2024), Disp: , Rfl:     pantoprazole (PROTONIX) 40 MG EC tablet, 0 (Patient not taking: Reported on 8/15/2024), Disp: , Rfl:     Current Facility-Administered Medications:     ondansetron ODT (ZOFRAN-ODT) disintegrating tablet 8 mg, 8 mg, Oral, Q6H PRN, Eligio Abraham MD      Assessment & Plan   1.  Diabetes mellitus type 2 with hyperglycemia: Overall improving with A1c of 7.0%.  At this time we have decided to continue Ozempic at 1 mg subcu weekly.  She will call me if she continues to have or have recurrence of abdominal pain with nausea and vomiting.  She verbalized understanding.    2.  Hypertension: Well-controlled, continue current medications.    3.  Hyperlipidemia: LDL well-controlled, triglycerides improving.  Continue rosuvastatin.    4.  Hypothyroidism: Well-controlled, continue levothyroxine.    5.  Diabetic peripheral neuropathy: Overall stable.    Assessment and plan from February 1, 2024 reviewed and updated.                Preeti Herrera MD FACE.

## 2024-10-24 ENCOUNTER — TELEPHONE (OUTPATIENT)
Dept: ENDOCRINOLOGY | Facility: CLINIC | Age: 74
End: 2024-10-24
Payer: MEDICARE

## 2024-10-24 NOTE — TELEPHONE ENCOUNTER
Pt called stating that she was told to inform provider when she was feeling nauseous with the ozempic, pt stated she had been on ozempic for awhile didn't know if she needed to have medication adjusted or not. I informed pt I would send a msg to provider about concern and call once I receive a response. Pt stated understanding. Please advise

## 2024-11-13 RX ORDER — ACETAMINOPHEN 160 MG
2000 TABLET,DISINTEGRATING ORAL DAILY
Qty: 90 CAPSULE | Refills: 3 | Status: SHIPPED | OUTPATIENT
Start: 2024-11-13

## 2024-12-11 ENCOUNTER — TELEPHONE (OUTPATIENT)
Dept: ENDOCRINOLOGY | Facility: CLINIC | Age: 74
End: 2024-12-11
Payer: MEDICARE

## 2024-12-11 NOTE — TELEPHONE ENCOUNTER
Pt dropped off some Blood Sugars for you to view,     Ozempic, 1 MG/DOSE, 4 MG/3ML Sig: INJECT 1 MG UNDER THE SKIN INTO THE APPROPRIATE AREA AS DIRECTED 1 (ONE) TIME PER WEEK.     Please advise.

## 2025-01-09 RX ORDER — BLOOD SUGAR DIAGNOSTIC
STRIP MISCELLANEOUS
Qty: 200 EACH | Refills: 2 | Status: SHIPPED | OUTPATIENT
Start: 2025-01-09

## 2025-01-09 RX ORDER — ACETAMINOPHEN 160 MG
2000 TABLET,DISINTEGRATING ORAL DAILY
Qty: 90 CAPSULE | Refills: 3 | Status: SHIPPED | OUTPATIENT
Start: 2025-01-09

## 2025-02-17 ENCOUNTER — HOSPITAL ENCOUNTER (EMERGENCY)
Facility: HOSPITAL | Age: 75
Discharge: HOME | End: 2025-02-17
Attending: EMERGENCY MEDICINE | Admitting: EMERGENCY MEDICINE
Payer: MEDICARE

## 2025-02-17 ENCOUNTER — APPOINTMENT (OUTPATIENT)
Dept: CARDIOLOGY | Facility: HOSPITAL | Age: 75
End: 2025-02-17
Payer: MEDICARE

## 2025-02-17 VITALS
HEART RATE: 72 BPM | OXYGEN SATURATION: 100 % | HEIGHT: 63 IN | SYSTOLIC BLOOD PRESSURE: 140 MMHG | TEMPERATURE: 97.6 F | WEIGHT: 214.51 LBS | DIASTOLIC BLOOD PRESSURE: 58 MMHG | RESPIRATION RATE: 18 BRPM | BODY MASS INDEX: 38.01 KG/M2

## 2025-02-17 DIAGNOSIS — M79.604 RIGHT LEG PAIN: Primary | ICD-10-CM

## 2025-02-17 LAB
BH CV LOWER VASCULAR LEFT COMMON FEMORAL AUGMENT: NORMAL
BH CV LOWER VASCULAR LEFT COMMON FEMORAL COMPETENT: NORMAL
BH CV LOWER VASCULAR LEFT COMMON FEMORAL COMPRESS: NORMAL
BH CV LOWER VASCULAR LEFT COMMON FEMORAL PHASIC: NORMAL
BH CV LOWER VASCULAR LEFT COMMON FEMORAL SPONT: NORMAL
BH CV LOWER VASCULAR RIGHT COMMON FEMORAL AUGMENT: NORMAL
BH CV LOWER VASCULAR RIGHT COMMON FEMORAL COMPETENT: NORMAL
BH CV LOWER VASCULAR RIGHT COMMON FEMORAL COMPRESS: NORMAL
BH CV LOWER VASCULAR RIGHT COMMON FEMORAL PHASIC: NORMAL
BH CV LOWER VASCULAR RIGHT COMMON FEMORAL SPONT: NORMAL
BH CV LOWER VASCULAR RIGHT DISTAL FEMORAL COMPRESS: NORMAL
BH CV LOWER VASCULAR RIGHT GASTRONEMIUS COMPRESS: NORMAL
BH CV LOWER VASCULAR RIGHT GREATER SAPH AK COMPRESS: NORMAL
BH CV LOWER VASCULAR RIGHT GREATER SAPH BK COMPRESS: NORMAL
BH CV LOWER VASCULAR RIGHT LESSER SAPH COMPRESS: NORMAL
BH CV LOWER VASCULAR RIGHT MID FEMORAL AUGMENT: NORMAL
BH CV LOWER VASCULAR RIGHT MID FEMORAL COMPETENT: NORMAL
BH CV LOWER VASCULAR RIGHT MID FEMORAL COMPRESS: NORMAL
BH CV LOWER VASCULAR RIGHT MID FEMORAL PHASIC: NORMAL
BH CV LOWER VASCULAR RIGHT MID FEMORAL SPONT: NORMAL
BH CV LOWER VASCULAR RIGHT PERONEAL COMPRESS: NORMAL
BH CV LOWER VASCULAR RIGHT POPLITEAL AUGMENT: NORMAL
BH CV LOWER VASCULAR RIGHT POPLITEAL COMPETENT: NORMAL
BH CV LOWER VASCULAR RIGHT POPLITEAL COMPRESS: NORMAL
BH CV LOWER VASCULAR RIGHT POPLITEAL PHASIC: NORMAL
BH CV LOWER VASCULAR RIGHT POPLITEAL SPONT: NORMAL
BH CV LOWER VASCULAR RIGHT POSTERIOR TIBIAL COMPRESS: NORMAL
BH CV LOWER VASCULAR RIGHT PROXIMAL FEMORAL COMPRESS: NORMAL
BH CV LOWER VASCULAR RIGHT SAPHENOFEMORAL JUNCTION COMPRESS: NORMAL
BH CV VAS PRELIMINARY FINDINGS SCRIPTING: 1

## 2025-02-17 PROCEDURE — 99284 EMERGENCY DEPT VISIT MOD MDM: CPT

## 2025-02-17 PROCEDURE — 93971 EXTREMITY STUDY: CPT | Performed by: SURGERY

## 2025-02-17 PROCEDURE — 93971 EXTREMITY STUDY: CPT

## 2025-02-17 NOTE — ED PROVIDER NOTES
Subjective   History of Present Illness  Patient is a 74-year-old female complaining of right lower leg pain for the past several days.  States she does have chronic pain but became worse over the past few days.  Denies recent trauma numbness tingling or other complaint.      Review of Systems    Past Medical History:   Diagnosis Date    Bunion     Callus     Chronic kidney disease     Difficulty walking I have moderate PAD    Disease of thyroid gland     Diverticulitis     Exertional dyspnea 08/06/2021    Heart murmur     History of transfusion Back in 1969    Hyperlipidemia     Hypertension     Ingrown toenail     Plantar fasciitis about 12 years ago    Type 2 diabetes mellitus     Uncontrolled type 2 diabetes mellitus with hyperglycemia 07/30/2019    Unstable angina 08/06/2021       Allergies   Allergen Reactions    Amoxicillin-Pot Clavulanate Diarrhea    Ciprofloxacin Myalgia    Beta Adrenergic Blockers Unknown - Low Severity    Amoxicillin Diarrhea    Clavulanic Acid Diarrhea    Flagyl [Metronidazole] Myalgia       Past Surgical History:   Procedure Laterality Date    CARDIAC CATHETERIZATION N/A 08/12/2021    Procedure: Left Heart Cath;  Surgeon: Eligio Abraham MD;  Location: [facility] CATH INVASIVE LOCATION;  Service: Cardiology;  Laterality: N/A;    CATARACT EXTRACTION  01/01/2014    COLON RESECTION  01/01/2016    ENDOSCOPY N/A 6/30/2024    Procedure: ESOPHAGOGASTRODUODENOSCOPY with BIOPSY;  Surgeon: Michelle Phillips MD;  Location: [facility] ENDOSCOPY;  Service: Gastroenterology;  Laterality: N/A;  Post:GASTRITIS, NISSEN    HERNIA REPAIR  01/01/2016    LEG SURGERY  09/23/2023    Vascular on left leg       Family History   Problem Relation Age of Onset    Stroke Mother     Cancer Father         Prostrate    Heart disease Brother     Cancer Brother         Pancreas    Cancer Maternal Grandmother         Colon    Heart disease Paternal Grandmother        Social History     Socioeconomic History    Marital  status:     Number of children: 3    Years of education: 12   Tobacco Use    Smoking status: Former     Current packs/day: 0.00     Average packs/day: 1 pack/day for 45.0 years (45.0 ttl pk-yrs)     Types: Cigarettes     Start date: 1967     Quit date: 2012     Years since quittin.7    Smokeless tobacco: Never    Tobacco comments:     quit several times for brief periods (pregnancies, etc.) Maybe more like 40 years   Vaping Use    Vaping status: Never Used   Substance and Sexual Activity    Alcohol use: Yes     Comment: Not a teetotaler, but maybe 8-10 drinks a year    Drug use: Never    Sexual activity: Not Currently     Partners: Male     Birth control/protection: None     Comment: why do you need to know?           Objective   Physical Exam  Exam shows patient have mild gastroc tenderness on palpation.  She has no swelling ecchymosis or other acute abnormality.  She has 2+ pulses and is neurovascularly intact.  Procedures           ED Course      Results for orders placed or performed during the hospital encounter of 25   Duplex Venous Lower Extremity - RIGHT    Collection Time: 25  9:32 AM   Result Value Ref Range    Right Common Femoral Spont Y     Right Common Femoral Competent Y     Right Common Femoral Phasic Y     Right Common Femoral Compress C     Right Common Femoral Augment Y     Right Saphenofemoral Junction Compress C     Right Proximal Femoral Compress C     Right Mid Femoral Spont Y     Right Mid Femoral Competent Y     Right Mid Femoral Phasic Y     Right Mid Femoral Compress C     Right Mid Femoral Augment Y     Right Distal Femoral Compress C     Right Popliteal Spont Y     Right Popliteal Competent Y     Right Popliteal Phasic Y     Right Popliteal Compress C     Right Popliteal Augment Y     Right Posterior Tibial Compress C     Right Peroneal Compress C     Right Gastronemius Compress C     Right Greater Saph AK Compress C     Right Greater Saph BK Compress C      Right Lesser Saph Compress C     Left Common Femoral Spont Y     Left Common Femoral Competent Y     Left Common Femoral Phasic Y     Left Common Femoral Compress C     Left Common Femoral Augment Y     BH CV VAS PRELIMINARY FINDINGS SCRIPTING 1.0                                                         Medical Decision Making  Duplex scan shows no DVT.  Patient will be discharged with right leg pain.  She will be placed on Naprosyn and will see MD for recheck.    Risk  Prescription drug management.        Final diagnoses:   Right leg pain       ED Disposition  ED Disposition       ED Disposition   Discharge    Condition   Stable    Comment   --               No follow-up provider specified.       Medication List        New Prescriptions      naproxen 375 MG tablet  Commonly known as: NAPROSYN  Take 1 tablet by mouth 2 (Two) Times a Day As Needed for Moderate Pain.               Where to Get Your Medications        Information about where to get these medications is not yet available    Ask your nurse or doctor about these medications  naproxen 375 MG tablet            Roberto Clifton MD  02/17/25 [id number]

## 2025-02-17 NOTE — ED NOTES
"Pt c/o left leg pain starting Friday AM. Rates pain 6/10/. Pt reports hx of peripheral artery disease and a stent placed \"a few years ago\". Moderate edema noted in bilateral lower extremities. -AP  "

## 2025-03-05 ENCOUNTER — OFFICE VISIT (OUTPATIENT)
Dept: CARDIOLOGY | Facility: CLINIC | Age: 75
End: 2025-03-05
Payer: MEDICARE

## 2025-03-05 VITALS
HEART RATE: 58 BPM | HEIGHT: 63 IN | WEIGHT: 211 LBS | SYSTOLIC BLOOD PRESSURE: 130 MMHG | OXYGEN SATURATION: 96 % | BODY MASS INDEX: 37.39 KG/M2 | DIASTOLIC BLOOD PRESSURE: 76 MMHG

## 2025-03-05 DIAGNOSIS — E11.9 CONTROLLED TYPE 2 DIABETES MELLITUS WITHOUT COMPLICATION, WITHOUT LONG-TERM CURRENT USE OF INSULIN: ICD-10-CM

## 2025-03-05 DIAGNOSIS — E78.5 DYSLIPIDEMIA: ICD-10-CM

## 2025-03-05 DIAGNOSIS — R06.02 SHORTNESS OF BREATH: Primary | ICD-10-CM

## 2025-03-05 DIAGNOSIS — I10 ESSENTIAL HYPERTENSION: ICD-10-CM

## 2025-03-05 DIAGNOSIS — E03.9 HYPOTHYROIDISM (ACQUIRED): ICD-10-CM

## 2025-03-05 RX ORDER — PIOGLITAZONE 30 MG/1
1 TABLET ORAL DAILY
COMMUNITY
Start: 2025-01-30

## 2025-03-05 RX ORDER — PERPHENAZINE 16 MG
TABLET ORAL
COMMUNITY

## 2025-03-05 RX ORDER — FLUTICASONE PROPIONATE 50 MCG
SPRAY, SUSPENSION (ML) NASAL EVERY 12 HOURS SCHEDULED
COMMUNITY

## 2025-03-05 NOTE — PROGRESS NOTES
Encounter Date:03/05/2025      Patient ID: Katty Ramirez is a 74 y.o. female.    Chief Complaint:  Shortness of breath  Hypertension  Dyslipidemia  CKD 3  Diabetes      History of Present Illness  Patient is a pleasant 74-year-old white female with history of multiple coronary risk factors here for evaluation of shortness of breath and back discomfort.  Patient has history of hypertension dyslipidemia diabetes.  The shortness of breath is with exertion.  He has associated dizziness.  Other associated aggravating or elevating factors.    Patient is not having any anterior chest discomfort palpitations or syncope.  Denies having any headache ,abdominal pain ,nausea, vomiting , diarrhea constipation, loss of weight or loss of appetite.  Denies having any excessive bruising ,hematuria or blood in the stool.    Review of all systems negative except as indicated.    Reviewed ROS.  Assessment and Plan     ]]]]]]]]]]]]]]]]]]]]]]]]]]]  Impression  ============  -Shortness of breath and back discomfort.  Cardiac cath 2021-Dr. Abraham-performed at [facility].  Apparently did not have any coronary artery disease    - Diabetes dyslipidemia hypertension GERD hypothyroidism CKD 3    - Peripheral vascular disease    - Exogenous obesity (BMI 38)    - Status post colon resection hernia repair and cataract extraction.    - Family history of stroke    - Former smoker    - Allergic to amoxicillin Cipro beta-blockers amoxicillin Flagyl  =============  Chest discomfort and shortness of breath.  History of normal coronary arteries 2021-by history.  Details not available.  EKG 3/5/2025 sinus bradycardia without ischemic changes  Patient has multiple coronary risk factors.    Stress Cardiolite test  Echocardiogram    Hypertension-well-controlled  130/76.    Dyslipidemia-continue rosuvastatin    Diabetes-on Actos.    Hypothyroidism-on levothyroxine    Medications were reviewed and updated.    Follow-up in the office on the same day  or soon after    Further plan will depend on patient's progress.  ]]]]]]]]]]]]]]]]]]]        LAB RESULTS (LAST 7 DAYS)    CBC        BMP        CMP         BNP        TROPONIN        CoAg        Creatinine Clearance  CrCl cannot be calculated (Patient's most recent lab result is older than the maximum 30 days allowed.).    ABG        Radiology  No radiology results for the last day                The following portions of the patient's history were reviewed and updated as appropriate: allergies, current medications, past family history, past medical history, past social history, past surgical history, and problem list.    Review of Systems   Constitutional: Positive for malaise/fatigue.   Cardiovascular:  Positive for leg swelling. Negative for chest pain, dyspnea on exertion and palpitations.   Respiratory:  Positive for shortness of breath. Negative for cough.    Gastrointestinal:  Negative for abdominal pain, nausea and vomiting.   Neurological:  Positive for dizziness and light-headedness. Negative for focal weakness, headaches and numbness.   All other systems reviewed and are negative.        Current Outpatient Medications:   •  Alpha-Lipoic Acid 600 MG capsule, Take  by mouth., Disp: , Rfl:   •  amLODIPine-benazepril (LOTREL) 10-20 MG per capsule, , Disp: , Rfl:   •  aspirin 81 MG EC tablet, Take 1 tablet by mouth Daily., Disp: , Rfl:   •  Cholecalciferol (Vitamin D3) 50 MCG (2000 UT) capsule, Take 1 capsule by mouth Daily., Disp: 90 capsule, Rfl: 3  •  clopidogrel (PLAVIX) 75 MG tablet, Take 1 tablet by mouth Daily., Disp: , Rfl:   •  DULoxetine (CYMBALTA) 30 MG capsule, Take 1 capsule by mouth Daily., Disp: 90 capsule, Rfl: 1  •  fluticasone (Flonase Allergy Relief) 50 MCG/ACT nasal spray, Every 12 (Twelve) Hours., Disp: , Rfl:   •  glucose blood (Accu-Chek Guide Test) test strip, Use to test BS twice daily.  DX E11.65, Disp: 200 each, Rfl: 2  •  latanoprost (XALATAN) 0.005 % ophthalmic solution, Administer  1 drop to both eyes Every Night., Disp: , Rfl:   •  levothyroxine (SYNTHROID, LEVOTHROID) 100 MCG tablet, TAKE 1 TABLET DAILY, Disp: 90 tablet, Rfl: 2  •  metoprolol succinate XL (TOPROL-XL) 50 MG 24 hr tablet, , Disp: , Rfl:   •  multivitamin with minerals (Centrum Women) tablet tablet, Take  by mouth Daily., Disp: , Rfl:   •  omeprazole (priLOSEC) 40 MG capsule, , Disp: , Rfl:   •  ondansetron (ZOFRAN) 4 MG tablet, , Disp: , Rfl:   •  pioglitazone (ACTOS) 30 MG tablet, Take 1 tablet by mouth Daily., Disp: , Rfl:   •  rosuvastatin (CRESTOR) 10 MG tablet, TAKE 1 TABLET BY MOUTH DAILY, Disp: 90 tablet, Rfl: 2  •  cilostazol (PLETAL) 100 MG tablet, 180 (Patient not taking: Reported on 3/5/2025), Disp: , Rfl:     Current Facility-Administered Medications:   •  ondansetron ODT (ZOFRAN-ODT) disintegrating tablet 8 mg, 8 mg, Oral, Q6H PRN, Eligio Abraham MD    Allergies   Allergen Reactions   • Amoxicillin-Pot Clavulanate Diarrhea   • Ciprofloxacin Myalgia   • Beta Adrenergic Blockers Unknown - Low Severity   • Amoxicillin Diarrhea   • Clavulanic Acid Diarrhea   • Flagyl [Metronidazole] Myalgia       Family History   Problem Relation Age of Onset   • Stroke Mother    • Cancer Father         Prostrate   • Heart disease Brother    • Cancer Brother         Pancreas   • Cancer Maternal Grandmother         Colon   • Heart disease Paternal Grandmother        Past Surgical History:   Procedure Laterality Date   • CARDIAC CATHETERIZATION N/A 08/12/2021    Procedure: Left Heart Cath;  Surgeon: Eligio Abraham MD;  [facility];  Service: Cardiology;  Laterality: N/A;   • CATARACT EXTRACTION  01/01/2014   • COLON RESECTION  01/01/2016   • ENDOSCOPY N/A 6/30/2024    Procedure: ESOPHAGOGASTRODUODENOSCOPY with BIOPSY;  Surgeon: Michelle Phillips MD;  [facility];  Service: Gastroenterology;  Laterality: N/A;  Post:GASTRITIS, NISSEN   • HERNIA REPAIR  01/01/2016   • LEG  SURGERY  2023    Vascular on left leg       Past Medical History:   Diagnosis Date   • Bunion    • Callus    • Chronic kidney disease    • Difficulty walking I have moderate PAD   • Disease of thyroid gland    • Diverticulitis    • Exertional dyspnea 2021   • Heart murmur    • History of transfusion Back in    • Hyperlipidemia    • Hypertension    • Ingrown toenail    • Plantar fasciitis about 12 years ago   • Type 2 diabetes mellitus    • Uncontrolled type 2 diabetes mellitus with hyperglycemia 2019   • Unstable angina 2021       Family History   Problem Relation Age of Onset   • Stroke Mother    • Cancer Father         Prostrate   • Heart disease Brother    • Cancer Brother         Pancreas   • Cancer Maternal Grandmother         Colon   • Heart disease Paternal Grandmother        Social History     Socioeconomic History   • Marital status:    • Number of children: 3   • Years of education: 12   Tobacco Use   • Smoking status: Former     Current packs/day: 0.00     Average packs/day: 1 pack/day for 45.0 years (45.0 ttl pk-yrs)     Types: Cigarettes     Start date: 1967     Quit date: 2012     Years since quittin.7   • Smokeless tobacco: Never   • Tobacco comments:     quit several times for brief periods (pregnancies, etc.) Maybe more like 40 years   Vaping Use   • Vaping status: Never Used   Substance and Sexual Activity   • Alcohol use: Yes     Comment: Not a teetotaler, but maybe 8-10 drinks a year   • Drug use: Never   • Sexual activity: Not Currently     Partners: Male     Birth control/protection: None     Comment: why do you need to know?           ECG 12 Lead    Date/Time: 3/5/2025 11:34 AM  Performed by: La Amato MD    Authorized by: La Amato MD  Comparison: compared with previous ECG   Similar to previous ECG  Comparison to previous ECG: Sinus pericardia 56/min normal axis normal intervals no ectopy no significant change from previous  "EKG.        Objective:       Physical Exam    /76 (BP Location: Left arm, Patient Position: Sitting, Cuff Size: Adult)   Pulse 58   Ht 160 cm (63\")   Wt 95.7 kg (211 lb)   SpO2 96%   BMI 37.38 kg/m²   The patient is alert, oriented and in no distress.    Vital signs as noted above.  Exogenous obesity.    Head and neck revealed no carotid bruits or jugular venous distension.  No thyromegaly or lymphadenopathy is present.    Lungs clear.  No wheezing.  Breath sounds are normal bilaterally.    Heart normal first and second heart sounds.  No murmur..  No pericardial rub is present.  No gallop is present.    Abdomen soft and nontender.  No organomegaly is present.    Extremities revealed good peripheral pulses without any pedal edema.    Skin warm and dry.    Musculoskeletal system is grossly normal.    CNS grossly normal.    Reviewed and updated.          "

## 2025-03-06 ENCOUNTER — PATIENT ROUNDING (BHMG ONLY) (OUTPATIENT)
Dept: CARDIOLOGY | Facility: CLINIC | Age: 75
End: 2025-03-06
Payer: MEDICARE

## 2025-03-06 NOTE — PROGRESS NOTES
A My-Chart message has been sent to the patient for PATIENT ROUNDING with [facility]

## 2025-03-10 ENCOUNTER — SPECIALTY PHARMACY (OUTPATIENT)
Dept: ENDOCRINOLOGY | Facility: CLINIC | Age: 75
End: 2025-03-10
Payer: MEDICARE

## 2025-03-10 ENCOUNTER — OFFICE VISIT (OUTPATIENT)
Dept: ENDOCRINOLOGY | Facility: CLINIC | Age: 75
End: 2025-03-10
Payer: MEDICARE

## 2025-03-10 VITALS
HEIGHT: 63 IN | WEIGHT: 205 LBS | DIASTOLIC BLOOD PRESSURE: 70 MMHG | SYSTOLIC BLOOD PRESSURE: 110 MMHG | HEART RATE: 69 BPM | OXYGEN SATURATION: 95 % | BODY MASS INDEX: 36.32 KG/M2

## 2025-03-10 DIAGNOSIS — E11.65 TYPE 2 DIABETES MELLITUS WITH HYPERGLYCEMIA, WITHOUT LONG-TERM CURRENT USE OF INSULIN: Primary | ICD-10-CM

## 2025-03-10 DIAGNOSIS — I10 ESSENTIAL HYPERTENSION: ICD-10-CM

## 2025-03-10 DIAGNOSIS — E11.42 DIABETIC PERIPHERAL NEUROPATHY: ICD-10-CM

## 2025-03-10 DIAGNOSIS — E03.9 ACQUIRED HYPOTHYROIDISM: ICD-10-CM

## 2025-03-10 DIAGNOSIS — E78.2 MIXED HYPERLIPIDEMIA: ICD-10-CM

## 2025-03-10 PROCEDURE — 1159F MED LIST DOCD IN RCRD: CPT | Performed by: INTERNAL MEDICINE

## 2025-03-10 PROCEDURE — 99214 OFFICE O/P EST MOD 30 MIN: CPT | Performed by: INTERNAL MEDICINE

## 2025-03-10 PROCEDURE — G2211 COMPLEX E/M VISIT ADD ON: HCPCS | Performed by: INTERNAL MEDICINE

## 2025-03-10 PROCEDURE — 1160F RVW MEDS BY RX/DR IN RCRD: CPT | Performed by: INTERNAL MEDICINE

## 2025-03-10 PROCEDURE — 3074F SYST BP LT 130 MM HG: CPT | Performed by: INTERNAL MEDICINE

## 2025-03-10 PROCEDURE — 3078F DIAST BP <80 MM HG: CPT | Performed by: INTERNAL MEDICINE

## 2025-03-10 RX ORDER — TIRZEPATIDE 2.5 MG/.5ML
2.5 INJECTION, SOLUTION SUBCUTANEOUS WEEKLY
Qty: 6 ML | Refills: 1 | Status: SHIPPED | OUTPATIENT
Start: 2025-03-10

## 2025-03-10 RX ORDER — TIRZEPATIDE 2.5 MG/.5ML
2.5 INJECTION, SOLUTION SUBCUTANEOUS WEEKLY
Start: 2025-03-10 | End: 2025-03-10 | Stop reason: SDUPTHER

## 2025-03-10 NOTE — PROGRESS NOTES
Specialty Pharmacy Patient Management Program       Katty Ramirez is a 75 y.o. female seen by an Endocrinology provider for Type 2 Diabetes and enrolled in the Endocrinology Patient Management program offered by [facility].      Requested Prescriptions     Pending Prescriptions Disp Refills    Tirzepatide (Mounjaro) 2.5 MG/0.5ML solution auto-injector 6 mL 1     Sig: Inject 2.5 mg under the skin into the appropriate area as directed 1 (One) Time Per Week.       Refill sent in to patient's pharmacy for above medication prescribed per telephone order by Dr. Herrera.   Last office visit 3/10/2025.  Next visit scheduled 7/10/2025.      Wei Poe, PharmD, MPH  Clinical Specialty Pharmacist, Endocrinology  3/10/2025  11:57 EDT

## 2025-03-10 NOTE — PROGRESS NOTES
Endocrine Progress Note Outpatient     Patient Care Team:  Sudha Edwards APRN as PCP - General (Nurse Practitioner)  Preeti Herrera MD as Consulting Physician (Endocrinology)  La Amato MD as Consulting Physician (Cardiology)    Chief Complaint: Follow-up type 2 diabetes    HPI: 75-year-old female with history of type 2 diabetes, hypertension, hyperlipidemia and hypothyroidism is here for follow-up.    For type 2 diabetes: She was on Ozempic but did develop some abdominal pain with some other GI side effects Ozempic was stopped.  She then started having some high blood sugars and her primary care physician and started her on Actos at 15 mg p.o. daily which subsequently was increased to 30 mg p.o. daily which she is tolerating well.  She has seen significant improvement in her blood sugars with most recent numbers are running less than 120 most of the time.      Hypertension: Well-controlled    Hypothyroidism: On levothyroxine supplementation    Hyperlipidemia: Currently on rosuvastatin.    She is status post stent placement in the left lower extremity on September 25, 2023.    Past Medical History:   Diagnosis Date    Bunion     Callus     Chronic kidney disease     Difficulty walking I have moderate PAD    Disease of thyroid gland     Diverticulitis     Exertional dyspnea 08/06/2021    Heart murmur     History of transfusion Back in 1969    Hyperlipidemia     Hypertension     Ingrown toenail     Plantar fasciitis about 12 years ago    Type 2 diabetes mellitus     Uncontrolled type 2 diabetes mellitus with hyperglycemia 07/30/2019    Unstable angina 08/06/2021       Social History     Socioeconomic History    Marital status:     Number of children: 3    Years of education: 12   Tobacco Use    Smoking status: Former     Current packs/day: 0.00     Average packs/day: 1 pack/day for 45.0 years (45.0 ttl pk-yrs)     Types: Cigarettes     Start date: 5/30/1967     Quit date: 5/31/2012     Years since  quittin.7    Smokeless tobacco: Never    Tobacco comments:     quit several times for brief periods (pregnancies, etc.) Maybe more like 40 years   Vaping Use    Vaping status: Never Used   Substance and Sexual Activity    Alcohol use: Yes     Comment: Not a teetotaler, but maybe 8-10 drinks a year    Drug use: Never    Sexual activity: Not Currently     Partners: Male     Birth control/protection: None     Comment: why do you need to know?       Family History   Problem Relation Age of Onset    Stroke Mother     Cancer Father         Prostrate    Heart disease Brother     Cancer Brother         Pancreas    Cancer Maternal Grandmother         Colon    Heart disease Paternal Grandmother        Allergies   Allergen Reactions    Amoxicillin-Pot Clavulanate Diarrhea    Ciprofloxacin Myalgia    Beta Adrenergic Blockers Unknown - Low Severity    Amoxicillin Diarrhea    Clavulanic Acid Diarrhea    Flagyl [Metronidazole] Myalgia       ROS:   Constitutional:  Admit fatigue, tiredness.    Eyes:  Denies change in visual acuity   HENT:  Denies nasal congestion or sore throat   Respiratory: denies cough, admit shortness of breath.   Cardiovascular:  denies chest pain, edema   GI:  Denies abdominal pain, nausea, vomiting.   Musculoskeletal:  Denies back pain or joint pain   Integument:  Denies dry skin and rash   Neurologic:  Denies headache, focal weakness or sensory changes   Endocrine:  Denies polyuria or polydipsia   Psychiatric:  Denies depression or anxiety      Vitals:    03/10/25 0941   BP: 110/70   Pulse: 69   SpO2: 95%     BMI: 36.3    Physical Exam:  GEN: NAD, conversant, obese  CHEST: CTA  CVS: RRR  PSYCH: AOX3, appropriate mood, affect normal      Results Review:     I reviewed the patient's new clinical results.    Lab Results   Component Value Date    HGBA1C 7.03 (H) 2024    HGBA1C 7.40 (H) 2024    HGBA1C 7.40 (H) 2023      Lab Results   Component Value Date    GLUCOSE 90 2024    BUN 13  08/08/2024    CREATININE 0.82 08/08/2024    EGFRIFNONA 47 (L) 02/22/2022    BCR 15.9 08/08/2024    K 4.7 08/08/2024    CO2 27.5 08/08/2024    CALCIUM 10.0 08/08/2024    ALBUMIN 3.9 08/08/2024    AST 19 08/08/2024    ALT 20 08/08/2024    CHOL 130 08/08/2024    TRIG 237 (H) 08/08/2024    LDL 54 08/08/2024    HDL 38 (L) 08/08/2024     Lab Results   Component Value Date    TSH 2.450 01/25/2024    FREET4 1.30 01/25/2024     Labs from February 26, 2025 showed sodium 146, potassium 4.2, chloride 106, glucose 80, BUN 22 with creatinine 1.08, AST 15 and ALT 14  LDL was 79 with triglycerides 364 and HDL was 49 and total cholesterol 186  TSH was 3.2 with total T4 was 7.5      Medication Review: Reviewed.       Current Outpatient Medications:     Alpha-Lipoic Acid 600 MG capsule, Take  by mouth., Disp: , Rfl:     amLODIPine-benazepril (LOTREL) 10-20 MG per capsule, , Disp: , Rfl:     aspirin 81 MG EC tablet, Take 1 tablet by mouth Daily., Disp: , Rfl:     Cholecalciferol (Vitamin D3) 50 MCG (2000 UT) capsule, Take 1 capsule by mouth Daily., Disp: 90 capsule, Rfl: 3    cilostazol (PLETAL) 100 MG tablet, , Disp: , Rfl:     clopidogrel (PLAVIX) 75 MG tablet, Take 1 tablet by mouth Daily., Disp: , Rfl:     DULoxetine (CYMBALTA) 30 MG capsule, Take 1 capsule by mouth Daily., Disp: 90 capsule, Rfl: 1    fluticasone (Flonase Allergy Relief) 50 MCG/ACT nasal spray, Every 12 (Twelve) Hours., Disp: , Rfl:     glucose blood (Accu-Chek Guide Test) test strip, Use to test BS twice daily.  DX E11.65, Disp: 200 each, Rfl: 2    latanoprost (XALATAN) 0.005 % ophthalmic solution, Administer 1 drop to both eyes Every Night., Disp: , Rfl:     levothyroxine (SYNTHROID, LEVOTHROID) 100 MCG tablet, TAKE 1 TABLET DAILY, Disp: 90 tablet, Rfl: 2    metoprolol succinate XL (TOPROL-XL) 50 MG 24 hr tablet, , Disp: , Rfl:     multivitamin with minerals (Centrum Women) tablet tablet, Take  by mouth Daily., Disp: , Rfl:     omeprazole (priLOSEC) 40 MG  capsule, , Disp: , Rfl:     ondansetron (ZOFRAN) 4 MG tablet, , Disp: , Rfl:     pioglitazone (ACTOS) 30 MG tablet, Take 1 tablet by mouth Daily., Disp: , Rfl:     rosuvastatin (CRESTOR) 10 MG tablet, TAKE 1 TABLET BY MOUTH DAILY, Disp: 90 tablet, Rfl: 2    Current Facility-Administered Medications:     ondansetron ODT (ZOFRAN-ODT) disintegrating tablet 8 mg, 8 mg, Oral, Q6H PRN, Eligio Abraham MD      Assessment & Plan   1.  Diabetes mellitus type 2 with hyperglycemia: Uncontrolled but her blood sugars are improving since she started taking pioglitazone.  At this time I will add Mounjaro 2.5 mg subcu weekly since she was not able to tolerate Ozempic.  Once she starts Mounjaro her pioglitazone dose will be reduced to 15 mg p.o. daily.  Until then she can continue pioglitazone 30 mg p.o. daily.  She is advised to continue to work on diet activity and always keep glucose source in case of low blood sugars.    2.  Hypertension: Well-controlled, continue current medications.    3.  Hyperlipidemia: On rosuvastatin, will follow lipid panel.    4.  Hypothyroidism: Well-controlled, continue levothyroxine.    5.  Diabetic peripheral neuropathy: Overall stable.    Assessment and plan from August 15, 2024 reviewed and updated.                Preeti Herrera MD FACE.

## 2025-03-10 NOTE — PROGRESS NOTES
Specialty Pharmacy Patient Management Program  Endocrinology Refill Outreach      Katty is a 75 y.o. female contacted today regarding refills of her medication(s).    Specialty medication(s) and dose(s) confirmed: Mounjaro 2.5mg      Delivery Questions      Flowsheet Row Most Recent Value   Delivery method  at Pharmacy  [Pickup - [facility]]   Delivery address verified with patient/caregiver? Yes  [Pickup - [facility]]   Delivery address Other (Enter below)  [Pickup - [facility]]   Number of medications in delivery 1   Medication(s) being filled and delivered Tirzepatide (Mounjaro)   Copay verified? Yes   Copay amount $1125.47   Copay form of payment Pay at pickup            Follow-Up: 84d      Wei Poe, PharmD, MPH  Clinical Specialty Pharmacist  3/10/2025  12:12 EDT

## 2025-03-10 NOTE — PATIENT INSTRUCTIONS
Start Mounjaro at 2.5 mg subcu weekly  Once you start Mounjaro you can decrease the pioglitazone to 15 mg p.o. daily  Continue rest of the medication  Continue to work on your diet and activity  Labs before follow-up.

## 2025-03-10 NOTE — PROGRESS NOTES
Specialty Pharmacy Patient Management Program  Endocrinology Initial Assessment     Katty Ramirez was referred by an Endocrinology provider to the Endocrinology Patient Management program offered by [facility] for Type 2 Diabetes on 03/10/25.  An initial outreach was conducted, including assessment of therapy appropriateness and specialty medication education for Mounjaro. The patient was introduced to services offered by [facility], including: regular assessments, refill coordination, curbside pick-up or mail order delivery options, prior authorization maintenance, and financial assistance programs as applicable. The patient was also provided with contact information for the pharmacy team.     Insurance Coverage & Financial Support  [employer]     Relevant Past Medical History and Comorbidities  Relevant medical history and concomitant health conditions were discussed with the patient. The patient's chart has been reviewed for relevant past medical history and comorbid conditions and updated as necessary.  Past Medical History:   Diagnosis Date    Bunion     Callus     Chronic kidney disease     Difficulty walking I have moderate PAD    Disease of thyroid gland     Diverticulitis     Exertional dyspnea 2021    Heart murmur     History of transfusion Back in     Hyperlipidemia     Hypertension     Ingrown toenail     Plantar fasciitis about 12 years ago    Type 2 diabetes mellitus     Uncontrolled type 2 diabetes mellitus with hyperglycemia 2019    Unstable angina 2021     Social History     Socioeconomic History    Marital status:     Number of children: 3    Years of education: 12   Tobacco Use    Smoking status: Former     Current packs/day: 0.00     Average packs/day: 1 pack/day for 45.0 years (45.0 ttl pk-yrs)     Types: Cigarettes     Start date: 1967     Quit date: 2012     Years since quittin.7    Smokeless tobacco:  Never    Tobacco comments:     quit several times for brief periods (pregnancies, etc.) Maybe more like 40 years   Vaping Use    Vaping status: Never Used   Substance and Sexual Activity    Alcohol use: Yes     Comment: Not a teetotaler, but maybe 8-10 drinks a year    Drug use: Never    Sexual activity: Not Currently     Partners: Male     Birth control/protection: None     Comment: why do you need to know?       Problem list reviewed by Wei Poe, PharmD on 3/10/2025 at 12:12 PM    Allergies  Known allergies and reactions were discussed with the patient. The patient's chart has been reviewed for  allergy information and updated as necessary.   Allergies   Allergen Reactions    Amoxicillin-Pot Clavulanate Diarrhea    Ciprofloxacin Myalgia    Beta Adrenergic Blockers Unknown - Low Severity    Amoxicillin Diarrhea    Clavulanic Acid Diarrhea    Flagyl [Metronidazole] Myalgia       Allergies reviewed by Wei Poe, PharmD on 3/10/2025 at 12:12 PM    Relevant Laboratory Values  Relevant laboratory values were discussed with the patient. The following specialty medication dose adjustment(s) are recommended: Initial Assessment - Starting Mounjaro 2.5mg weekly and enrolling in  at this time.  Baseline A1c 7.03%, will follow.    A1C Last 3 Results          8/8/2024    08:31   HGBA1C Last 3 Results   Hemoglobin A1C 7.03      Lab Results   Component Value Date    HGBA1C 7.03 (H) 08/08/2024     Lab Results   Component Value Date    GLUCOSE 90 08/08/2024    CALCIUM 10.0 08/08/2024     08/08/2024    K 4.7 08/08/2024    CO2 27.5 08/08/2024     08/08/2024    BUN 13 08/08/2024    CREATININE 0.82 08/08/2024    EGFRIFNONA 47 (L) 02/22/2022    BCR 15.9 08/08/2024    ANIONGAP 6.5 08/08/2024     Lab Results   Component Value Date    CHOL 130 08/08/2024    TRIG 237 (H) 08/08/2024    HDL 38 (L) 08/08/2024    LDL 54 08/08/2024     Microalbumin          8/8/2024    08:31   Microalbumin   Microalbumin, Urine 5.9         Current Medication List  This medication list has been reviewed with the patient and evaluated for any interactions or necessary modifications/recommendations, and updated to include all prescription medications, OTC medications, and supplements the patient is currently taking.  This list reflects what is contained in the patient's profile, which has also been marked as reviewed to communicate to other providers it is the most up to date version of the patient's current medication therapy.     Current Outpatient Medications:     Tirzepatide (Mounjaro) 2.5 MG/0.5ML solution auto-injector, Inject 2.5 mg under the skin into the appropriate area as directed 1 (One) Time Per Week., Disp: 6 mL, Rfl: 1    Alpha-Lipoic Acid 600 MG capsule, Take  by mouth., Disp: , Rfl:     amLODIPine-benazepril (LOTREL) 10-20 MG per capsule, , Disp: , Rfl:     aspirin 81 MG EC tablet, Take 1 tablet by mouth Daily., Disp: , Rfl:     Cholecalciferol (Vitamin D3) 50 MCG (2000 UT) capsule, Take 1 capsule by mouth Daily., Disp: 90 capsule, Rfl: 3    cilostazol (PLETAL) 100 MG tablet, , Disp: , Rfl:     clopidogrel (PLAVIX) 75 MG tablet, Take 1 tablet by mouth Daily., Disp: , Rfl:     DULoxetine (CYMBALTA) 30 MG capsule, Take 1 capsule by mouth Daily., Disp: 90 capsule, Rfl: 1    fluticasone (Flonase Allergy Relief) 50 MCG/ACT nasal spray, Every 12 (Twelve) Hours., Disp: , Rfl:     glucose blood (Accu-Chek Guide Test) test strip, Use to test BS twice daily.  DX E11.65, Disp: 200 each, Rfl: 2    latanoprost (XALATAN) 0.005 % ophthalmic solution, Administer 1 drop to both eyes Every Night., Disp: , Rfl:     levothyroxine (SYNTHROID, LEVOTHROID) 100 MCG tablet, TAKE 1 TABLET DAILY, Disp: 90 tablet, Rfl: 2    metoprolol succinate XL (TOPROL-XL) 50 MG 24 hr tablet, , Disp: , Rfl:     multivitamin with minerals (Centrum Women) tablet tablet, Take  by mouth Daily., Disp: , Rfl:     omeprazole (priLOSEC) 40 MG capsule, , Disp: , Rfl:      ondansetron (ZOFRAN) 4 MG tablet, , Disp: , Rfl:     pioglitazone (ACTOS) 30 MG tablet, Take 1 tablet by mouth Daily., Disp: , Rfl:     rosuvastatin (CRESTOR) 10 MG tablet, TAKE 1 TABLET BY MOUTH DAILY, Disp: 90 tablet, Rfl: 2    Current Facility-Administered Medications:     ondansetron ODT (ZOFRAN-ODT) disintegrating tablet 8 mg, 8 mg, Oral, Q6H PRN, Eligio Abraham MD    Medicines reviewed by Wei Poe, PharmD on 3/10/2025 at 11:56 AM  Medicines reviewed by Wei Poe, PharmD on 3/10/2025 at 12:12 PM    Drug Interactions  No Clinically Significant DDIs Were Identified at Present Time Upon Marking Medications Reviewed    Recommended Medications Assessment  Aspirin: Currently Taking   Statin: Currently Taking   ACEi/ARB: Currently Taking     Initial Education Provided for Specialty Medication  The patient has been provided with the following education and any applicable administration techniques (i.e. self-injection) have been demonstrated for the therapies indicated. All questions and concerns have been addressed prior to the patient receiving the medication, and the patient has verbalized comprehension of the education and any materials provided. Additional patient education shall be provided and documented upon request by the patient, provider, or payer.        Mounjaro® (tirzepatide)   How long will I be on this medication for?  The amount of time you will be on this medication will be determined by your doctor based on blood sugar and A1c control. You will most likely be on this medication or another diabetes medication throughout your lifetime. Do not abruptly stop this medication without talking to your doctor first.     How do I take this medication?  Take as directed on your prescription label. Mounjaro is supplied in a single-use pen for each dose and you will use a new pre-filled pen each week.  It is injected under the skin (subcutaneously) of your stomach, thigh or upper arm.  You  may inject in the same body area each week, on the same day each week, with or without food.      What are some possible side effects?  In addition to decreased appetite, the most common side effects are nausea and indigestion. Redness, itching, and/or swelling at the injection site may occur. You should also monitor for low blood sugar (hypoglycemia) if you are taking Mounjaro with other medications that cause low blood sugar.     What happens if I miss a dose?  If you miss a dose, take it as soon as you remember as long as there are at least 3 days until the next scheduled dose.  If there are less than 3 days, skip the missed dose and resume  Mounjaro on the regularly scheduled day.  Do not take 2 doses at the same time or extra doses.      Medication Safety    What are things I should warn my doctor immediately about?  Do not use Mounjaro if you or a family member have ever had medullary thyroid cancer (MTC) or Multiple Endocrine Neoplasia syndrome type 2 (MEN 2).  Tell your doctor if you have or have had problems with your kidneys or pancreas.  Talk to your doctor if you are pregnant, planning to become pregnant, or breastfeeding. Stop using Mounjaro and get medical help right away if you have severe pain in your stomach area that will not go away, or if you notice any signs/symptoms of an allergic reaction (rash, hives, difficulty breathing, etc.).    What are things that I should be cautious of?  Be cautious of any side effects from this medication. Talk to your doctor if any new ones develop or aren't getting better.    What are some medications that can interact with this one?  Taking Mounjaro with other medications that also lower your blood sugar such as insulin and glipizide/glimepiride/glyburide may increase the risk of low blood sugar. Your doctor may reduce the dose of these medications when you start Mounjaro to minimize low blood sugars.  Always tell your doctor or pharmacist immediately if you start  taking any new medications, including over-the-counter medications, vitamins, and herbal supplements.        Medication Storage/Handling    How should I handle this medication?  Keep this medication out of reach of pets/children and keep the pen capped when not in use.  Do not share your medicine pens with others.    How does this medication need to be stored?  Store Mounjaro in the refrigerator. Do not freeze and do not use if Mounjaro has been frozen.  You may store your Mounjaro pens at room temperature for up to 21 days.  Protect from excessive heat and sunlight.  Keep in the original carton until time of administration.    How should I dispose of this medication?  Used Mounjaro pens should discarded after each use in an approved sharps container after use.  If you do not have a sharps container, you may use a household container made of heavy-duty plastic with a tight-fitting lid that is leak resistant (e.g., heavy-duty plastic laundry detergent bottle). If your doctor decides to stop this medication, take to your local police station for proper disposal. Some pharmacies also have take-back bins for medication drop-off.       Resources/Support    How can I remind myself to take this medication?  You can download reminder apps to help you manage your refills. You may also set an alarm on your phone to remind you.     Is financial support available?   [facility] can provide co-pay cards if you have commercial insurance or patient assistance if you have Medicare or no insurance.     Which vaccines are recommended for me?  Talk to your doctor about these vaccines: Flu, Coronavirus (COVID-19), Pneumococcal (pneumonia), Tdap, Hepatitis B, Zoster (shingles)           Adherence and Self-Administration  Adherence related to the patient's specialty therapy was discussed with the patient. The Adherence segment of this outreach has been reviewed and updated.     Is there a concern with patient's ability to self administer  the medication correctly and without issue?: No  Were any potential barriers to adherence identified during the initial assessment or patient education?: No  Are there any concerns regarding the patient's understanding of the importance of medication adherence?: No  Methods for Supporting Patient Adherence and/or Self-Administration: Continued  enrollment    Open Medication Therapy Problems  No medication therapy recommendations to display    Goals of Therapy  Goals related to the patient's specialty therapy were discussed with the patient. The Patient Goals segment of this outreach has been reviewed and updated.   Goals Addressed Today        Specialty Pharmacy General Goal      Decrease A1c to <7%      Lab Results    Component Value Date     Initial Assessment  03/10/2025 Initial Assessment - Starting Mounjaro 2.5mg weekly and enrolling in  at this time.  Baseline A1c 7.03%, will follow.  CR    HGBA1C 7.03 (H) 08/08/2024     HGBA1C 7.40 (H) 01/25/2024     HGBA1C 7.40 (H) 09/26/2023     HGBA1C 6.5 (H) 10/18/2022     HGBA1C 5.8 (H) 03/03/2022                  Reassessment Plan & Follow-Up  1. Medication Therapy Changes: Initial Assessment - Starting Mounjaro 2.5mg weekly and enrolling in  at this time.  Baseline A1c 7.03%, will follow.  2. Related Plans, Therapy Recommendations, or Therapy Problems to Be Addressed: None  3. Pharmacist to perform regular assessments no more than (6) months from the previous assessment.  4. Care Coordinator to set up future refill outreaches, coordinate prescription delivery, and escalate clinical questions to pharmacist.  5. Welcome information and patient satisfaction survey to be sent by specialty pharmacy team with patient's initial fill.    Attestation  Therapeutic appropriateness: Appropriate   I attest the patient was actively involved in and has agreed to the above plan of care. If the prescribed therapy is at any point deemed not appropriate based on the current or  future assessments, a consultation will be initiated with the patient's specialty care provider to determine the best course of action. The revised plan of therapy will be documented along with any required assessments and/or additional patient education provided.       Wei Poe, PharmD, MPH  Clinical Specialty Pharmacist, Endocrinology  3/10/2025  12:14 EDT    Discussed the aforementioned information with the patient via In-Person.

## 2025-03-19 DIAGNOSIS — E03.9 ACQUIRED HYPOTHYROIDISM: ICD-10-CM

## 2025-03-19 DIAGNOSIS — E11.9 TYPE 2 DIABETES MELLITUS WITHOUT COMPLICATION, WITHOUT LONG-TERM CURRENT USE OF INSULIN: ICD-10-CM

## 2025-03-19 DIAGNOSIS — I10 ESSENTIAL HYPERTENSION: ICD-10-CM

## 2025-03-19 DIAGNOSIS — E78.2 MIXED HYPERLIPIDEMIA: ICD-10-CM

## 2025-03-19 RX ORDER — ROSUVASTATIN CALCIUM 10 MG/1
10 TABLET, COATED ORAL DAILY
Qty: 90 TABLET | Refills: 2 | Status: SHIPPED | OUTPATIENT
Start: 2025-03-19

## 2025-03-31 NOTE — PROGRESS NOTES
LOS: 3 days   Patient Care Team:  Jose Brenner MD as PCP - General  Jose Brenner MD as PCP - Family Medicine  Eligio Abraham MD as Consulting Physician (Cardiology)  Preeti Herrera MD as Consulting Physician (Endocrinology)    Subjective:  Pt examined at bedside.  She is in good spirits.    She is tolerating liquid diet.  Reports she continues to have abd pain but states it has improved from when she was initially admit.    Pt does understand no surgical intervention required at this time, we will continue IV abx, and that we will slowly advance her diet as tolerated.   Once she is able to tolerate diet we can discharge- tentatively planning for discharge on 2/2/2023.       Review of Systems:   Review of Systems   Constitutional: Positive for appetite change. Negative for activity change and fatigue.   HENT: Negative.    Respiratory: Negative.    Cardiovascular: Negative.    Gastrointestinal: Positive for abdominal distention and abdominal pain. Negative for blood in stool, constipation, diarrhea, nausea and vomiting.   Genitourinary: Negative.    Musculoskeletal: Negative.    Skin: Negative.    Neurological: Negative.    Hematological: Negative.    Psychiatric/Behavioral: Negative.            Vital Signs  Temp:  [97.2 °F (36.2 °C)-98.3 °F (36.8 °C)] 97.4 °F (36.3 °C)  Heart Rate:  [64-82] 69  Resp:  [15-22] 16  BP: (130-172)/(69-80) 152/77    Physical Exam:  Physical Exam  Constitutional:       General: She is not in acute distress.     Appearance: She is obese.   HENT:      Head: Normocephalic and atraumatic.      Nose: Nose normal.      Mouth/Throat:      Mouth: Mucous membranes are moist.      Pharynx: Oropharynx is clear.   Eyes:      General: No scleral icterus.     Conjunctiva/sclera: Conjunctivae normal.   Cardiovascular:      Rate and Rhythm: Normal rate and regular rhythm.      Pulses: Normal pulses.      Heart sounds: Normal heart sounds. No murmur heard.    No friction rub. No  gallop.   Pulmonary:      Effort: Pulmonary effort is normal. No respiratory distress.      Breath sounds: Normal breath sounds. No wheezing, rhonchi or rales.   Abdominal:      General: Bowel sounds are normal. There is distension.      Palpations: Abdomen is soft.      Tenderness: There is abdominal tenderness. There is no guarding.      Comments: ABD soft.  Continues to be mildly distended by improved from weekend.    Pain with light and deep palpation to bilateral epigastric area.     Musculoskeletal:         General: Normal range of motion.      Cervical back: Normal range of motion and neck supple.      Right lower leg: No edema.      Left lower leg: No edema.   Lymphadenopathy:      Cervical: No cervical adenopathy.   Skin:     General: Skin is warm and dry.      Capillary Refill: Capillary refill takes less than 2 seconds.   Neurological:      General: No focal deficit present.      Mental Status: She is alert and oriented to person, place, and time.      Cranial Nerves: No cranial nerve deficit.   Psychiatric:         Mood and Affect: Mood normal.         Behavior: Behavior normal.          Radiology:  CT Abdomen Pelvis With Contrast    Result Date: 1/28/2023  Impression: 1. Significant duodenitis involving the second portion of the duodenum probably related to underlying peptic ulcer disease with questionable contained adjacent perforation extending laterally along the right lateral margin of the duodenum. Abnormal contour is present within this region which may be related to contained perforation or may be related to significant inflammatory change and presence of a duodenal diverticulum distally. No evidence of noncontiguous focal collection or evidence of free air. 2. Ancillary findings as described above. Electronically Signed: Amaya Rose  1/28/2023 8:11 AM EST  Workstation ID: [id number]    XR Chest 1 View    Result Date: 1/28/2023  Impression: 1.Cardiomegaly. Electronically Signed: Burton Najera   1/28/2023 7:21 AM [facility]  Workstation ID: [id number]         Results Review:     I reviewed the patient's new clinical results.  I reviewed the patient's new imaging results and agree with the interpretation.    Medication Review:   Scheduled Meds:DULoxetine, 60 mg, Oral, Daily  insulin lispro, 2-7 Units, Subcutaneous, 4x Daily With Meals & Nightly  latanoprost, 1 drop, Both Eyes, Nightly  levothyroxine, 100 mcg, Oral, Q AM  metoprolol succinate XL, 25 mg, Oral, Q24H  pantoprazole, 40 mg, Intravenous, Q12H  piperacillin-tazobactam, 3.375 g, Intravenous, Q8H  sodium chloride, 3 mL, Intravenous, Q12H  sucralfate, 1 g, Oral, 4x Daily AC & at Bedtime      Continuous Infusions:lactated ringers, 125 mL/hr, Last Rate: 125 mL/hr (01/30/23 2001)  Pharmacy to Dose Zosyn,       PRN Meds:.•  dextrose  •  dextrose  •  glucagon (human recombinant)  •  hydrALAZINE  •  Morphine  •  nitroglycerin  •  ondansetron  •  Pharmacy to Dose Zosyn  •  phenol  •  sodium chloride  •  sodium chloride    Labs:    CBC    Results from last 7 days   Lab Units 01/31/23  0824 01/29/23  2252 01/29/23  0436 01/28/23  1720 01/28/23  0643   WBC 10*3/mm3 6.90 7.50 8.50 10.00 11.50*   HEMOGLOBIN g/dL 13.7 11.6* 12.3 12.7 13.4   PLATELETS 10*3/mm3 325 246 238 237 240     BMP   Results from last 7 days   Lab Units 01/31/23  0851 01/29/23  2252 01/29/23  0436 01/28/23  1720 01/28/23  0643   SODIUM mmol/L 146* 141 139 141 140   POTASSIUM mmol/L 3.7 3.6 4.2 3.9 4.1   CHLORIDE mmol/L 105 105 105 106 102   CO2 mmol/L 28.0 24.0 25.0 25.0 26.0   BUN mg/dL 9 10 10 11 16   CREATININE mg/dL 0.96 0.87 0.88 0.81 0.99   GLUCOSE mg/dL 129* 97 139* 118* 181*     Cr Clearance Estimated Creatinine Clearance: 54.7 mL/min (by C-G formula based on SCr of 0.96 mg/dL).  Coag   Results from last 7 days   Lab Units 01/29/23  0436   INR  1.05     HbA1C   Lab Results   Component Value Date    HGBA1C 6.5 (H) 10/18/2022    HGBA1C 5.8 (H) 03/03/2022    HGBA1C 5.9 (H) 08/30/2021     Blood  Glucose   Glucose   Date/Time Value Ref Range Status   02/01/2023 0715 106 (H) 70 - 105 mg/dL Final     Comment:     Serial Number: [id number]:  [id number]   01/31/2023 2104 86 70 - 105 mg/dL Final     Comment:     Serial Number: [id number]:  [id number]   01/31/2023 1710 101 70 - 105 mg/dL Final     Comment:     Serial Number: [id number]:  [id number]   01/31/2023 1131 105 70 - 105 mg/dL Final     Comment:     Serial Number: [id number]:  [id number]   01/31/2023 0609 103 70 - 105 mg/dL Final     Comment:     Serial Number: [id number]:  [id number]   01/31/2023 0024 102 70 - 105 mg/dL Final     Comment:     Serial Number: [id number]:  [id number]   01/30/2023 1747 74 70 - 105 mg/dL Final     Comment:     Serial Number: [id number]:  [id number]   01/30/2023 1314 105 70 - 105 mg/dL Final     Comment:     Serial Number: [id number]:  [id number]     Infection   Results from last 7 days   Lab Units 01/29/23  1248   BLOODCX  No growth at 2 days  No growth at 2 days     CMP   Results from last 7 days   Lab Units 01/31/23  0851 01/29/23  2252 01/29/23  0436 01/28/23  1720 01/28/23  0643   SODIUM mmol/L 146* 141 139 141 140   POTASSIUM mmol/L 3.7 3.6 4.2 3.9 4.1   CHLORIDE mmol/L 105 105 105 106 102   CO2 mmol/L 28.0 24.0 25.0 25.0 26.0   BUN mg/dL 9 10 10 11 16   CREATININE mg/dL 0.96 0.87 0.88 0.81 0.99   GLUCOSE mg/dL 129* 97 139* 118* 181*   ALBUMIN g/dL  --   --  3.6  --  4.1   BILIRUBIN mg/dL  --   --  0.6  --  0.7   ALK PHOS U/L  --   --  54  --  59   AST (SGOT) U/L  --   --  13  --  14   ALT (SGPT) U/L  --   --  13  --  15   AMYLASE U/L  --   --   --   --  94   LIPASE U/L  --   --   --   --  139*     UA    Results from last 7 days   Lab Units 01/28/23  0814   NITRITE UA  Negative     Radiology(recent) No radiology results for the last day   Assessment/Plan:  Acute abdominal pain:  -continues to have epigastric pain but it is improving    Acute duodenitis:   -Continue PPI and  carafate  -Continue IV Zozyn  -Plan to slowly advance diet as pt tolerates.     Questionable contained paraduodenal perforation:  -No surgical intervention    Peptic ulcer disease:  -Continue PPT and Carafate    Gastroesophageal reflux disease with esophagitis:  -Continue PPI and carafate    History of Clement's esophageal change:   -Continue follow up with GI as outpatient    History of Nissen fundoplication:  -Continue PPI and carafate  -Continue fu with GI as outpt    Chronic disease stage II:  -Avoid nephrotoxins  -Keep DM and HTN well controlled.     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  Tentatively planning for discharge on 2/2/2023 if she is able to tolerate food.     Chelsea Mccloud, APRN  02/01/23  11:19 EST         [Negative] : Neurological

## 2025-04-08 ENCOUNTER — OFFICE VISIT (OUTPATIENT)
Age: 75
End: 2025-04-08
Payer: MEDICARE

## 2025-04-08 VITALS — OXYGEN SATURATION: 96 % | HEART RATE: 78 BPM | BODY MASS INDEX: 36 KG/M2 | WEIGHT: 203.2 LBS | HEIGHT: 63 IN

## 2025-04-08 DIAGNOSIS — I73.9 PAD (PERIPHERAL ARTERY DISEASE): ICD-10-CM

## 2025-04-08 DIAGNOSIS — L60.3 ONYCHODYSTROPHY: ICD-10-CM

## 2025-04-08 DIAGNOSIS — R26.81 UNSTEADINESS ON FEET: ICD-10-CM

## 2025-04-08 DIAGNOSIS — L60.0 INGROWN TOENAIL OF BOTH FEET: ICD-10-CM

## 2025-04-08 DIAGNOSIS — E11.42 DIABETIC PERIPHERAL NEUROPATHY ASSOCIATED WITH TYPE 2 DIABETES MELLITUS: ICD-10-CM

## 2025-04-08 DIAGNOSIS — E11.65 TYPE 2 DIABETES MELLITUS WITH HYPERGLYCEMIA, WITHOUT LONG-TERM CURRENT USE OF INSULIN: Primary | ICD-10-CM

## 2025-04-08 NOTE — PROGRESS NOTES
04/08/2025  Foot and Ankle Surgery - Established Patient/Follow-up  Provider: MARYAM Olivo   Location: [facility]    Subjective:  Katty Ramirez is a 75 y.o. female.     Chief Complaint   Patient presents with    Left Foot - Follow-up, Diabetes     Dm foot check     Right Foot - Follow-up, Diabetes     Dm foot check     Follow-Up     PCP: Sudha Edwards APRN  Last PCP Visit:   3/4/25       History of Present Illness  The patient is a 75-year-old female who presents for a routine diabetic foot check.    She reports no new foot-related issues. She experiences ingrown toenails on the medial aspect of her halluces, which are not severe and remain manageable with regular trimming. She reports minimal burning or tingling sensations in her feet. She typically wears footwear even within her home but does not experience any loss of sensation when barefoot. She has a history of stent placement in one of her feet. Her last consultation with her vascular specialist, Dr. Momin, was in January 2025, during which all tests were normal.    Her blood glucose levels are well-managed, with an A1c of 8.4 recorded approximately 5 weeks ago. This reading was taken after discontinuation of Ozempic and other medications.    She also has sciatica and leg swelling, which she attributes to her antihypertensive medication. Despite this, she has been on this medication for an extended period and maintains good blood pressure control.    MEDICATIONS  Discontinued: Ozempic       Allergies   Allergen Reactions    Amoxicillin-Pot Clavulanate Diarrhea    Ciprofloxacin Myalgia    Beta Adrenergic Blockers Unknown - Low Severity    Amoxicillin Diarrhea    Clavulanic Acid Diarrhea    Flagyl [Metronidazole] Myalgia       Current Outpatient Medications on File Prior to Visit   Medication Sig Dispense Refill    Alpha-Lipoic Acid 600 MG capsule Take  by mouth.      amLODIPine-benazepril (LOTREL) 10-20 MG per capsule       aspirin 81 MG  "EC tablet Take 1 tablet by mouth Daily.      Cholecalciferol (Vitamin D3) 50 MCG (2000 UT) capsule Take 1 capsule by mouth Daily. 90 capsule 3    cilostazol (PLETAL) 100 MG tablet       clopidogrel (PLAVIX) 75 MG tablet Take 1 tablet by mouth Daily.      cyclobenzaprine (FLEXERIL) 10 MG tablet Take 1 tablet by mouth 3 (Three) Times a Day As Needed for Muscle Spasms. 15 tablet 0    DULoxetine (CYMBALTA) 30 MG capsule Take 1 capsule by mouth Daily. 90 capsule 1    fluticasone (Flonase Allergy Relief) 50 MCG/ACT nasal spray Every 12 (Twelve) Hours.      glucose blood (Accu-Chek Guide Test) test strip Use to test BS twice daily.  DX E11.65 200 each 2    latanoprost (XALATAN) 0.005 % ophthalmic solution Administer 1 drop to both eyes Every Night.      levothyroxine (SYNTHROID, LEVOTHROID) 100 MCG tablet TAKE 1 TABLET DAILY 90 tablet 2    metoprolol succinate XL (TOPROL-XL) 50 MG 24 hr tablet       multivitamin with minerals (Centrum Women) tablet tablet Take  by mouth Daily.      naproxen (NAPROSYN) 375 MG tablet Take 1 tablet by mouth 2 (Two) Times a Day As Needed for Moderate Pain. 30 tablet 0    omeprazole (priLOSEC) 40 MG capsule       ondansetron (ZOFRAN) 4 MG tablet       pioglitazone (ACTOS) 30 MG tablet Take 1 tablet by mouth Daily.      rosuvastatin (CRESTOR) 10 MG tablet TAKE 1 TABLET BY MOUTH DAILY 90 tablet 2    Tirzepatide (Mounjaro) 2.5 MG/0.5ML solution auto-injector Inject 2.5 mg under the skin into the appropriate area as directed 1 (One) Time Per Week. 6 mL 1     Current Facility-Administered Medications on File Prior to Visit   Medication Dose Route Frequency Provider Last Rate Last Admin    ondansetron ODT (ZOFRAN-ODT) disintegrating tablet 8 mg  8 mg Oral Q6H PRN Eligio Abraham MD           Objective   Pulse 78   Ht 160 cm (63\")   Wt 92.2 kg (203 lb 3.2 oz)   SpO2 96%   Breastfeeding No   BMI 36.00 kg/m²     Foot/Ankle Exam    GENERAL  Diabetic foot exam performed    Appearance:  " appears stated age  Orientation:  AAOx3  Affect:  appropriate  Gait:  unimpaired  Assistance:  independent  Right shoe gear: casual shoe and sock  Left shoe gear: casual shoe and sock    VASCULAR     Right Foot Vascularity   Dorsalis pedis:  2+  Skin temperature:  warm  Edema gradin+  CFT:  < 3 seconds  Pedal hair growth:  Absent  Varicosities:  mild varicosities     Left Foot Vascularity   Dorsalis pedis:  1+  Skin temperature:  warm  Edema grading:  Trace  CFT:  < 3 seconds  Pedal hair growth:  Absent  Varicosities:  mild varicosities     NEUROLOGIC     Right Foot Neurologic   Light touch sensation: normal  Protective Sensation using [place]-Yuriy Monofilament:   Sites intact: 9  Sites tested: 10     Left Foot Neurologic   Light touch sensation: normal  Protective Sensation using [place]-Yuriy Monofilament:   Sites intact: 6  Sites tested: 10    MUSCULOSKELETAL     Right Foot Musculoskeletal   Ecchymosis:  none  Tenderness:  toenail problem       Left Foot Musculoskeletal   Ecchymosis:  none  Tenderness:  toenail problem    DERMATOLOGIC      Right Foot Dermatologic   Skin  Positive for dryness and skin changes.   Nails  1.  Positive for elongated, abnormal thickness and dystrophic nail.  2.  Positive for elongated, abnormal thickness and dystrophic nail.  3.  Positive for elongated, abnormal thickness and dystrophic nail.  4.  Positive for elongated, abnormal thickness and dystrophic nail.  5.  Positive for elongated, abnormal thickness and dystrophic nail.     Left Foot Dermatologic   Skin  Positive for dryness and skin changes.   Nails  1.  Positive for elongated, abnormal thickness and dystrophic nail.  2.  Positive for elongated, abnormal thickness and dystrophic nail.  3.  Positive for elongated, abnormal thickness and dystrophic nail.  4.  Positive for elongated, abnormally thick and dystrophic nail.  5.  Positive for elongated, abnormally thick and dystrophic nail.  Physical Exam  Pulses are  present in both feet, indicating good arterial blood flow. There is a little bit of decreased protective sensation to the left foot more so than the right.       Results  Laboratory Studies  A1c was 8.4 about 5 weeks ago.     Assessment & Plan   Diagnoses and all orders for this visit:    1. Type 2 diabetes mellitus with hyperglycemia, without long-term current use of insulin (Primary)    2. Onychodystrophy    3. Unsteadiness on feet    4. Diabetic peripheral neuropathy associated with type 2 diabetes mellitus    5. Ingrown toenail of both feet    6. PAD (peripheral artery disease)      Assessment & Plan  1. Diabetic foot care.  She presents with ingrown toenails on the medial aspect of her halluces, which are currently well-managed. Her blood glucose levels have been elevated recently, and she exhibits some degree of decreased sensation in her feet, indicative of neuropathy. Additionally, she has a history of compromised blood flow. Given these factors, she is classified as having a moderate risk level for foot-related complications associated with diabetes. However, at present, there are no signs of any impending issues. She was advised against partial nail removal due to the effective management of her condition. Instead, conservative measures such as Epsom salt soaks were recommended to alleviate swelling and irritation, and to cleanse any dry dead skin around the nail bed that could potentially exert pressure. She was instructed to trim her nails straight across and avoid digging around the sides to prevent further irritation and swelling. The use of soft shoes was suggested for comfort. She was also advised to monitor her feet daily and avoid walking barefoot. For the ingrown toenail pain, Epsom salt soaks were recommended, ensuring the water is not hot. It was deemed safer for her to have her toenails trimmed professionally rather than independently or at a nail salon. Her nails were debrided during this  visit.    2. Neuropathy.  She exhibits decreased protective sensation in both feet, more so in the left foot. This neuropathy is likely due to elevated blood sugars and possibly other factors such as sciatica and leg swelling. She was educated on the importance of keeping her blood sugars under control to prevent worsening of neuropathy. The use of light compression stockings and keeping her legs elevated were recommended to manage leg swelling, which may also help with neuropathy symptoms.    3. Diabetes mellitus.  Her blood sugar levels were previously high at 8.4% after being taken off Ozempic and not having any medication. She reports that her blood sugar levels are now better controlled. She was advised to continue monitoring her blood sugar levels closely and maintain a balanced diet and regular exercise regimen.    Explained importance of diabetic foot care, daily foot checks, and glycemic control. Patient should check both feet on a daily basis, monitor and control blood sugars, make sure that both feet and in between toes are towel dried after baths or showers. Avoid barefoot walking at all times.  I discussed wearing white socks and checking shoes before wearing them. Patient was given information on proper foot care. Call the office at the first signs of a wound or with signs of infection.     Nail debridement: Both feet x10    Consent and time out was performed before proceeding with the procedure. Nails were debrided with a nail nipper without complication.  No anesthesia was required.  Indications for procedure were thickened, dystrophic, and fungal appearing nails which are difficult to trim.  Proper self-care and technique was discussed with patient.  Patient was stable after procedure.     Follow-up  The patient will follow up in 1 year or sooner if necessary.    PROCEDURE  The patient's nails were debrided during this visit.  She has a history of stent placement in one of her feet.       No orders  of the defined types were placed in this encounter.         Patient or patient representative verbalized consent for the use of Ambient Listening during the visit with  MARYAM Sandoval for chart documentation. 4/8/2025  14:33 EDT

## 2025-04-09 DIAGNOSIS — E03.9 ACQUIRED HYPOTHYROIDISM: ICD-10-CM

## 2025-04-09 RX ORDER — LEVOTHYROXINE SODIUM 100 UG/1
100 TABLET ORAL DAILY
Qty: 90 TABLET | Refills: 2 | Status: SHIPPED | OUTPATIENT
Start: 2025-04-09

## 2025-04-10 ENCOUNTER — HOSPITAL ENCOUNTER (OUTPATIENT)
Dept: CARDIOLOGY | Facility: HOSPITAL | Age: 75
Discharge: HOME | End: 2025-04-10
Payer: MEDICARE

## 2025-04-10 VITALS
WEIGHT: 203.26 LBS | HEIGHT: 63 IN | DIASTOLIC BLOOD PRESSURE: 55 MMHG | HEART RATE: 68 BPM | SYSTOLIC BLOOD PRESSURE: 136 MMHG | BODY MASS INDEX: 36.02 KG/M2

## 2025-04-10 DIAGNOSIS — E11.9 CONTROLLED TYPE 2 DIABETES MELLITUS WITHOUT COMPLICATION, WITHOUT LONG-TERM CURRENT USE OF INSULIN: ICD-10-CM

## 2025-04-10 DIAGNOSIS — I10 ESSENTIAL HYPERTENSION: ICD-10-CM

## 2025-04-10 DIAGNOSIS — E78.5 DYSLIPIDEMIA: ICD-10-CM

## 2025-04-10 DIAGNOSIS — E03.9 HYPOTHYROIDISM (ACQUIRED): ICD-10-CM

## 2025-04-10 DIAGNOSIS — R06.02 SHORTNESS OF BREATH: ICD-10-CM

## 2025-04-10 LAB
AORTIC DIMENSIONLESS INDEX: 0.66 (DI)
AV MEAN PRESS GRAD SYS DOP V1V2: 6.3 MMHG
AV VMAX SYS DOP: 171 CM/SEC
BH CV ECHO LEFT VENTRICLE GLOBAL LONGITUDINAL STRAIN: -15.3 %
BH CV ECHO MEAS - AO MAX PG: 11.7 MMHG
BH CV ECHO MEAS - AO ROOT DIAM: 3.1 CM
BH CV ECHO MEAS - AO V2 VTI: 40.6 CM
BH CV ECHO MEAS - AVA(I,D): 2.06 CM2
BH CV ECHO MEAS - EDV(CUBED): 114.8 ML
BH CV ECHO MEAS - EDV(MOD-SP2): 87 ML
BH CV ECHO MEAS - EDV(MOD-SP4): 66.8 ML
BH CV ECHO MEAS - EF(MOD-SP2): 58.1 %
BH CV ECHO MEAS - EF(MOD-SP4): 57.2 %
BH CV ECHO MEAS - ESV(CUBED): 46.5 ML
BH CV ECHO MEAS - ESV(MOD-SP2): 36.4 ML
BH CV ECHO MEAS - ESV(MOD-SP4): 28.6 ML
BH CV ECHO MEAS - FS: 26 %
BH CV ECHO MEAS - IVS/LVPW: 1.1 CM
BH CV ECHO MEAS - IVSD: 1.09 CM
BH CV ECHO MEAS - LA DIMENSION: 3.8 CM
BH CV ECHO MEAS - LAT PEAK E' VEL: 7.7 CM/SEC
BH CV ECHO MEAS - LV DIASTOLIC VOL/BSA (35-75): 34.3 CM2
BH CV ECHO MEAS - LV MASS(C)D: 183.4 GRAMS
BH CV ECHO MEAS - LV MAX PG: 4.4 MMHG
BH CV ECHO MEAS - LV MEAN PG: 2.7 MMHG
BH CV ECHO MEAS - LV SYSTOLIC VOL/BSA (12-30): 14.7 CM2
BH CV ECHO MEAS - LV V1 MAX: 104.4 CM/SEC
BH CV ECHO MEAS - LV V1 VTI: 26.7 CM
BH CV ECHO MEAS - LVIDD: 4.9 CM
BH CV ECHO MEAS - LVIDS: 3.6 CM
BH CV ECHO MEAS - LVOT AREA: 3.1 CM2
BH CV ECHO MEAS - LVOT DIAM: 2 CM
BH CV ECHO MEAS - LVPWD: 0.99 CM
BH CV ECHO MEAS - MED PEAK E' VEL: 7.1 CM/SEC
BH CV ECHO MEAS - MR MAX PG: 25.5 MMHG
BH CV ECHO MEAS - MR MAX VEL: 252.3 CM/SEC
BH CV ECHO MEAS - MR MEAN PG: 21.9 MMHG
BH CV ECHO MEAS - MR MEAN VEL: 233.3 CM/SEC
BH CV ECHO MEAS - MR VTI: 95.3 CM
BH CV ECHO MEAS - MV A MAX VEL: 79.6 CM/SEC
BH CV ECHO MEAS - MV DEC SLOPE: 221.2 CM/SEC2
BH CV ECHO MEAS - MV DEC TIME: 0.28 SEC
BH CV ECHO MEAS - MV E MAX VEL: 62.9 CM/SEC
BH CV ECHO MEAS - MV E/A: 0.79
BH CV ECHO MEAS - MV MAX PG: 4 MMHG
BH CV ECHO MEAS - MV MEAN PG: 1.37 MMHG
BH CV ECHO MEAS - MV V2 VTI: 24.6 CM
BH CV ECHO MEAS - MVA(VTI): 3.4 CM2
BH CV ECHO MEAS - PA ACC TIME: 0.26 SEC
BH CV ECHO MEAS - PA V2 MAX: 111.1 CM/SEC
BH CV ECHO MEAS - PI END-D VEL: 97.7 CM/SEC
BH CV ECHO MEAS - RAP SYSTOLE: 3 MMHG
BH CV ECHO MEAS - RV MAX PG: 1.96 MMHG
BH CV ECHO MEAS - RV V1 MAX: 70 CM/SEC
BH CV ECHO MEAS - RV V1 VTI: 22.7 CM
BH CV ECHO MEAS - RVDD: 3 CM
BH CV ECHO MEAS - RVSP: 20.5 MMHG
BH CV ECHO MEAS - SV(LVOT): 83.7 ML
BH CV ECHO MEAS - SV(MOD-SP2): 50.5 ML
BH CV ECHO MEAS - SV(MOD-SP4): 38.2 ML
BH CV ECHO MEAS - SVI(LVOT): 43 ML/M2
BH CV ECHO MEAS - SVI(MOD-SP2): 26 ML/M2
BH CV ECHO MEAS - SVI(MOD-SP4): 19.6 ML/M2
BH CV ECHO MEAS - TAPSE (>1.6): 1.95 CM
BH CV ECHO MEAS - TR MAX PG: 17.5 MMHG
BH CV ECHO MEAS - TR MAX VEL: 200.2 CM/SEC
BH CV ECHO MEASUREMENTS AVERAGE E/E' RATIO: 8.5
BH CV REST NUCLEAR ISOTOPE DOSE: 10.7 MCI
BH CV STRESS COMMENTS STAGE 1: NORMAL
BH CV STRESS DOSE REGADENOSON STAGE 1: 0.4
BH CV STRESS DURATION MIN STAGE 1: 0
BH CV STRESS DURATION SEC STAGE 1: 10
BH CV STRESS NUCLEAR ISOTOPE DOSE: 33.7 MCI
BH CV STRESS PROTOCOL 1: NORMAL
BH CV STRESS RECOVERY BP: NORMAL MMHG
BH CV STRESS RECOVERY HR: 88 BPM
BH CV STRESS STAGE 1: 1
LV EF BIPLANE MOD: 57 %
MAXIMAL PREDICTED HEART RATE: 144 BPM
SPECT HRT GATED+EF W RNC IV: 70 %
STRESS BASELINE BP: NORMAL MMHG
STRESS BASELINE HR: 71 BPM
STRESS TARGET HR: 122 BPM

## 2025-04-10 PROCEDURE — A9500 TC99M SESTAMIBI: HCPCS | Performed by: INTERNAL MEDICINE

## 2025-04-10 PROCEDURE — 34310000005 TECHNETIUM SESTAMIBI: Performed by: INTERNAL MEDICINE

## 2025-04-10 PROCEDURE — 25010000002 REGADENOSON 0.4 MG/5ML SOLUTION: Performed by: INTERNAL MEDICINE

## 2025-04-10 PROCEDURE — 93356 MYOCRD STRAIN IMG SPCKL TRCK: CPT

## 2025-04-10 PROCEDURE — 93306 TTE W/DOPPLER COMPLETE: CPT

## 2025-04-10 PROCEDURE — 93017 CV STRESS TEST TRACING ONLY: CPT

## 2025-04-10 PROCEDURE — 78452 HT MUSCLE IMAGE SPECT MULT: CPT

## 2025-04-10 RX ORDER — REGADENOSON 0.08 MG/ML
0.4 INJECTION, SOLUTION INTRAVENOUS
Status: COMPLETED | OUTPATIENT
Start: 2025-04-10 | End: 2025-04-10

## 2025-04-10 RX ADMIN — TECHNETIUM TC 99M SESTAMIBI 1 DOSE: 1 INJECTION INTRAVENOUS at 08:12

## 2025-04-10 RX ADMIN — REGADENOSON 0.4 MG: 0.08 INJECTION, SOLUTION INTRAVENOUS at 10:03

## 2025-04-10 RX ADMIN — TECHNETIUM TC 99M SESTAMIBI 1 DOSE: 1 INJECTION INTRAVENOUS at 10:03

## 2025-04-29 ENCOUNTER — TELEPHONE (OUTPATIENT)
Dept: CARDIOLOGY | Facility: CLINIC | Age: 75
End: 2025-04-29
Payer: MEDICARE

## 2025-04-29 RX ORDER — PIOGLITAZONE 30 MG/1
30 TABLET ORAL DAILY
Qty: 30 TABLET | Refills: 6 | Status: SHIPPED | OUTPATIENT
Start: 2025-04-29

## 2025-04-29 NOTE — TELEPHONE ENCOUNTER
Does this have to be with Dr Amato? He is booked out to July and the patient has an appointment on 5/21 with ronna tran is that soon enough? Please advise

## 2025-05-09 DIAGNOSIS — E03.9 ACQUIRED HYPOTHYROIDISM: Primary | ICD-10-CM

## 2025-05-09 RX ORDER — PIOGLITAZONE 30 MG/1
30 TABLET ORAL DAILY
Qty: 30 TABLET | Refills: 6 | Status: SHIPPED | OUTPATIENT
Start: 2025-05-09

## 2025-05-12 ENCOUNTER — SPECIALTY PHARMACY (OUTPATIENT)
Dept: ENDOCRINOLOGY | Facility: CLINIC | Age: 75
End: 2025-05-12
Payer: MEDICARE

## 2025-05-12 NOTE — PROGRESS NOTES
Specialty Pharmacy Patient Management Program  Endocrinology Refill Outreach      Katty is a 75 y.o. female contacted today regarding refills of her medication(s).    Specialty medication(s) and dose(s) confirmed: Mounjaro    Refill Questions      Flowsheet Row Most Recent Value   Changes to allergies? No   Changes to medications? No   New conditions or infections since last clinic visit No   Unplanned office visit, urgent care, ED, or hospital admission in the last 4 weeks  No   How does patient/caregiver feel medication is working? Good   Financial problems or insurance changes  No   Since the previous refill, were any specialty medication doses or scheduled injections missed or delayed?  No   Does this patient require a clinical escalation to a pharmacist? No          Delivery Questions      Flowsheet Row Most Recent Value   Delivery method  at Pharmacy   Number of medications in delivery 1   Medication(s) being filled and delivered Tirzepatide (Mounjaro)   Copay verified? Yes   Copay amount $697.26   Copay form of payment Pay at pickup   Delivery Date Selection 05/12/25   Signature Required No   Do you consent to receive electronic handouts?  No            Follow-Up: 3 months    Kecia Garcia CPHT  Clinical Specialty Pharmacy Technician  5/12/2025  10:57 EDT

## 2025-05-20 ENCOUNTER — OFFICE (AMBULATORY)
Dept: URBAN - METROPOLITAN AREA CLINIC 64 | Facility: CLINIC | Age: 75
End: 2025-05-20
Payer: MEDICARE

## 2025-05-20 VITALS
WEIGHT: 196 LBS | SYSTOLIC BLOOD PRESSURE: 125 MMHG | DIASTOLIC BLOOD PRESSURE: 74 MMHG | HEIGHT: 64 IN | HEART RATE: 68 BPM

## 2025-05-20 DIAGNOSIS — K21.9 GASTRO-ESOPHAGEAL REFLUX DISEASE WITHOUT ESOPHAGITIS: ICD-10-CM

## 2025-05-20 DIAGNOSIS — K22.70 BARRETT'S ESOPHAGUS WITHOUT DYSPLASIA: ICD-10-CM

## 2025-05-20 PROCEDURE — 99214 OFFICE O/P EST MOD 30 MIN: CPT | Performed by: NURSE PRACTITIONER

## 2025-05-20 RX ORDER — FAMOTIDINE 40 MG/1
40 TABLET, FILM COATED ORAL
Qty: 30 | Refills: 11 | Status: ACTIVE
Start: 2025-05-20

## 2025-05-21 ENCOUNTER — TELEPHONE (OUTPATIENT)
Dept: CARDIOLOGY | Facility: CLINIC | Age: 75
End: 2025-05-21

## 2025-05-21 ENCOUNTER — OFFICE VISIT (OUTPATIENT)
Dept: CARDIOLOGY | Facility: CLINIC | Age: 75
End: 2025-05-21
Payer: MEDICARE

## 2025-05-21 VITALS
BODY MASS INDEX: 34.91 KG/M2 | SYSTOLIC BLOOD PRESSURE: 134 MMHG | OXYGEN SATURATION: 96 % | DIASTOLIC BLOOD PRESSURE: 66 MMHG | HEIGHT: 63 IN | HEART RATE: 72 BPM | WEIGHT: 197 LBS

## 2025-05-21 DIAGNOSIS — I10 ESSENTIAL HYPERTENSION: ICD-10-CM

## 2025-05-21 DIAGNOSIS — E03.9 ACQUIRED HYPOTHYROIDISM: ICD-10-CM

## 2025-05-21 DIAGNOSIS — Z71.2 ENCOUNTER TO DISCUSS TEST RESULTS: Primary | ICD-10-CM

## 2025-05-21 DIAGNOSIS — M54.32 SCIATICA OF LEFT SIDE: ICD-10-CM

## 2025-05-21 DIAGNOSIS — E78.2 MIXED HYPERLIPIDEMIA: ICD-10-CM

## 2025-05-21 DIAGNOSIS — R06.09 EXERTIONAL DYSPNEA: ICD-10-CM

## 2025-05-21 RX ORDER — FAMOTIDINE 40 MG/1
20 TABLET, FILM COATED ORAL DAILY
COMMUNITY
Start: 2025-05-20

## 2025-05-21 NOTE — TELEPHONE ENCOUNTER
My requesting patient's most recent labs from Sudha Edwards's office. I tried to call but was on hold for awhile.

## 2025-05-21 NOTE — PROGRESS NOTES
"    Subjective:     Encounter Date:05/21/2025      Patient ID: Katty Ramirez is a 75 y.o. female.    Chief Complaint: Follow up for results     History of Present Illness    Mrs. Katty Ramirez is a patient of Dr. Amato with a past medical history of       Cardiac cath 2021-Dr. Abraham-performed at [facility].  Apparently did not have any coronary artery disease    Shortness of breath  Hypertension  Dyslipidemia  CKD 3  Diabetes  Hypothyroidism   PVD       Here to discuss stress test and echocardiogram results.  She denies any chest pain but does continue to have chronic dyspnea on exertion, especially walking long distances and edema that has been occur for about a year.   She is currently having some head congestion due to a \"summer cold\" she reports.     Blood pressure in office today is 134/66 HR 72 oxygen 96% on room air weight 197 lbs.          Stress test 4/10/2025    Myocardial perfusion imaging indicates a normal myocardial perfusion study with no evidence of ischemia. Impressions are consistent with a low risk study.    Left ventricular ejection fraction is normal (Calculated EF = 70%).    Findings consistent with a normal ECG stress test.    Echocardiogram 4/10/2025    Left ventricular systolic function is normal. Left ventricular ejection fraction appears to be 56 - 60%.    Left ventricular diastolic function is consistent with (grade I) impaired relaxation.    Estimated right ventricular systolic pressure from tricuspid regurgitation is normal (<35 mmHg).  Trace to mild MR             Lab Review:     The following portions of the patient's history were reviewed and updated as appropriate: allergies, current medications, past family history, past medical history, past social history, past surgical history, and problem list.    Review of Systems   Constitutional: Positive for malaise/fatigue.   Cardiovascular:  Positive for dyspnea on exertion and leg swelling. Negative for chest pain and " "palpitations.   Respiratory:  Negative for cough and shortness of breath.    Gastrointestinal:  Negative for abdominal pain, nausea and vomiting.   Neurological:  Positive for dizziness and light-headedness. Negative for headaches, numbness and weakness.   All other systems reviewed and are negative.    Past Medical History:   Diagnosis Date    Bunion     Callus     Chronic kidney disease     Difficulty walking I have moderate PAD    Disease of thyroid gland     Diverticulitis     Exertional dyspnea 08/06/2021    Heart murmur     History of transfusion Back in 1969    Hyperlipidemia     Hypertension     Hypothyroidism     Ingrown toenail     Plantar fasciitis about 12 years ago    Type 2 diabetes mellitus     Uncontrolled type 2 diabetes mellitus with hyperglycemia 07/30/2019    Unstable angina 08/06/2021     Past Surgical History:   Procedure Laterality Date    CARDIAC CATHETERIZATION N/A 08/12/2021    Procedure: Left Heart Cath;  Surgeon: Eligio Abraham MD;  Location: [facility] CATH INVASIVE LOCATION;  Service: Cardiology;  Laterality: N/A;    CATARACT EXTRACTION  01/01/2014    COLON RESECTION  01/01/2016    ENDOSCOPY N/A 6/30/2024    Procedure: ESOPHAGOGASTRODUODENOSCOPY with BIOPSY;  Surgeon: Michelle Phillips MD;  Location: [facility] ENDOSCOPY;  Service: Gastroenterology;  Laterality: N/A;  Post:GASTRITIS, NISSEN    HERNIA REPAIR  01/01/2016    LEG SURGERY  09/23/2023    Vascular on left leg     /66 (BP Location: Left arm, Patient Position: Sitting, Cuff Size: Adult)   Pulse 72   Ht 160 cm (63\")   Wt 89.4 kg (197 lb)   SpO2 96%   BMI 34.90 kg/m²   Family History   Problem Relation Age of Onset    Stroke Mother     Cancer Father         Prostrate    Heart disease Brother     Cancer Brother         Pancreas    Cancer Maternal Grandmother         Colon    Heart disease Paternal Grandmother        Current Outpatient Medications:     Alpha-Lipoic Acid 600 MG capsule, Take  by mouth., Disp: , Rfl:     " amLODIPine-benazepril (LOTREL) 10-20 MG per capsule, , Disp: , Rfl:     aspirin 81 MG EC tablet, Take 1 tablet by mouth Daily., Disp: , Rfl:     Cholecalciferol (Vitamin D3) 50 MCG (2000 UT) capsule, Take 1 capsule by mouth Daily., Disp: 90 capsule, Rfl: 3    clopidogrel (PLAVIX) 75 MG tablet, Take 1 tablet by mouth Daily., Disp: , Rfl:     cyclobenzaprine (FLEXERIL) 10 MG tablet, Take 1 tablet by mouth 3 (Three) Times a Day As Needed for Muscle Spasms., Disp: , Rfl:     famotidine (PEPCID) 40 MG tablet, Take 0.5 tablets by mouth Daily., Disp: , Rfl:     fluticasone (Flonase Allergy Relief) 50 MCG/ACT nasal spray, Every 12 (Twelve) Hours., Disp: , Rfl:     glucose blood (Accu-Chek Guide Test) test strip, Use to test BS twice daily.  DX E11.65, Disp: 200 each, Rfl: 2    latanoprost (XALATAN) 0.005 % ophthalmic solution, Administer 1 drop to both eyes Every Night., Disp: , Rfl:     levothyroxine (SYNTHROID, LEVOTHROID) 100 MCG tablet, TAKE 1 TABLET DAILY, Disp: 90 tablet, Rfl: 2    metoprolol succinate XL (TOPROL-XL) 50 MG 24 hr tablet, , Disp: , Rfl:     multivitamin with minerals (Centrum Women) tablet tablet, Take  by mouth Daily., Disp: , Rfl:     omeprazole (priLOSEC) 40 MG capsule, , Disp: , Rfl:     ondansetron (ZOFRAN) 4 MG tablet, , Disp: , Rfl:     pioglitazone (ACTOS) 30 MG tablet, Take 0.5 tablets by mouth Daily., Disp: , Rfl:     rosuvastatin (CRESTOR) 10 MG tablet, TAKE 1 TABLET BY MOUTH DAILY, Disp: 90 tablet, Rfl: 2    Tirzepatide (Mounjaro) 2.5 MG/0.5ML solution auto-injector, Inject 2.5 mg under the skin into the appropriate area as directed 1 (One) Time Per Week., Disp: 6 mL, Rfl: 1  Allergies   Allergen Reactions    Amoxicillin-Pot Clavulanate Diarrhea    Ciprofloxacin Myalgia    Beta Adrenergic Blockers Unknown - Low Severity    Amoxicillin Diarrhea    Clavulanic Acid Diarrhea    Flagyl [Metronidazole] Myalgia    Nitrofurantoin Unknown - Low Severity     Social History     Socioeconomic History     Marital status:     Number of children: 3    Years of education: 12   Tobacco Use    Smoking status: Former     Current packs/day: 0.00     Average packs/day: 1 pack/day for 45.0 years (45.0 ttl pk-yrs)     Types: Cigarettes     Start date: 1967     Quit date: 2012     Years since quittin.9     Passive exposure: Past    Smokeless tobacco: Never    Tobacco comments:     quit several times for brief periods (pregnancies, etc.) Maybe more like 40 years   Vaping Use    Vaping status: Never Used   Substance and Sexual Activity    Alcohol use: Not Currently     Comment: Not a teetotaler, but maybe 8-10 drinks a year    Drug use: Never    Sexual activity: Not Currently     Partners: Male     Birth control/protection: Post-menopausal, None     Comment: why do you need to know?                Objective:     Vitals reviewed.   Constitutional:       Appearance: Not in distress. Frail.   Neck:      Vascular: No JVR. JVD normal.   Pulmonary:      Effort: Pulmonary effort is normal.      Breath sounds: Normal breath sounds. No wheezing. No rhonchi. No rales.   Chest:      Chest wall: Not tender to palpatation.   Cardiovascular:      PMI at left midclavicular line. Normal rate. Regular rhythm. Normal S1. Normal S2.       Murmurs: There is a grade 1/6 systolic murmur.      No gallop.  No click. No rub.   Pulses:     Intact distal pulses.   Edema:     Pretibial: bilateral trace edema of the pretibial area.     Ankle: bilateral trace edema of the ankle.  Abdominal:      General: Bowel sounds are normal.      Palpations: Abdomen is soft.      Tenderness: There is no abdominal tenderness.   Musculoskeletal: Normal range of motion.         General: No tenderness. Skin:     General: Skin is warm and dry.   Neurological:      General: No focal deficit present.      Mental Status: Alert and oriented to person, place and time.       Procedures                  Assessment:     [facility]       Diagnosis Plan   1. Encounter to  discuss test results        2. Essential hypertension        3. Mixed hyperlipidemia        4. Acquired hypothyroidism  pioglitazone (ACTOS) 30 MG tablet      5. Sciatica of left side  cyclobenzaprine (FLEXERIL) 10 MG tablet      6. Exertional dyspnea                       Plan:   Chart reviewed   Labs reviewed   Stress test was negative for ischemic --- discussed with patient and daughter     Echocardiogram showed normal LV function with grade 1 diastolic dysfunction, normal cardiac valves     Advised to continue aspirin, plavix for PAD??   Advised to continue amlodipine-benazepril and metoprolol for blood pressure control     Advised we could change blood pressure medication to exclude amlodipine as it sometimes causes swelling.   Could also consider adding diuretic like furosemide (she has been on this before and has issues with being incontinent of urine and does not currently want to change medications).    Could also potentially change actos to jardiance/Farixga to help with diastolic dysfunction and diabetes.  Will defer to PCP for this.       Electronically signed by MARYAM Harvey, 05/25/25, 11:50 AM EDT.          This document is intended for medical expert use only.  Reading of this document by patients and/or patient's family without participating medical staff guidance may result in misinterpretation and unintended morbidity. Any interpretation of such data is the responsibility of the patient and/or family member responsible for the patient in concert with their primary or specialist providers, not to be left for sources of online search as such as [employer], [employer] or similar queries.  Relying on these approaches to knowledge may result in misinterpretation, misguided goals of care and even death should patient or family members try recommendations outside of the realm of professional medical care in a supervised inpatient environment.

## 2025-05-21 NOTE — LETTER
May 21, 2025     MARYAM Bennett  [address] [place] [address]    Patient: Katty Ramirez   YOB: 1950   Date of Visit: 5/21/2025     Dear MARYAM Bennett:     Please send lab results for the past year.          Sincerely,    HELDER Zamora/     MARYAM Harvey Marlea A, [facility] Rep  05/21/25 1342  Signed  My requesting patient's most recent labs from Sudha Edwards's office. I tried to call but was on hold for awhile.

## 2025-05-25 RX ORDER — PIOGLITAZONE 30 MG/1
15 TABLET ORAL DAILY
Start: 2025-05-25

## 2025-05-25 RX ORDER — CYCLOBENZAPRINE HCL 10 MG
10 TABLET ORAL 3 TIMES DAILY PRN
Start: 2025-05-25

## 2025-07-03 ENCOUNTER — LAB (OUTPATIENT)
Dept: ENDOCRINOLOGY | Facility: CLINIC | Age: 75
End: 2025-07-03
Payer: MEDICARE

## 2025-07-03 DIAGNOSIS — E11.65 TYPE 2 DIABETES MELLITUS WITH HYPERGLYCEMIA, WITHOUT LONG-TERM CURRENT USE OF INSULIN: ICD-10-CM

## 2025-07-05 LAB
ALBUMIN SERPL-MCNC: 4.1 G/DL (ref 3.8–4.8)
ALBUMIN/CREAT UR: 36 MG/G CREAT (ref 0–29)
ALP SERPL-CCNC: 85 IU/L (ref 44–121)
ALT SERPL-CCNC: 17 IU/L (ref 0–32)
AST SERPL-CCNC: 19 IU/L (ref 0–40)
BILIRUB SERPL-MCNC: 0.3 MG/DL (ref 0–1.2)
BUN SERPL-MCNC: 25 MG/DL (ref 8–27)
BUN/CREAT SERPL: 24 (ref 12–28)
CALCIUM SERPL-MCNC: 9.5 MG/DL (ref 8.7–10.3)
CHLORIDE SERPL-SCNC: 104 MMOL/L (ref 96–106)
CHOLEST SERPL-MCNC: 149 MG/DL (ref 100–199)
CO2 SERPL-SCNC: 23 MMOL/L (ref 20–29)
CREAT SERPL-MCNC: 1.06 MG/DL (ref 0.57–1)
CREAT UR-MCNC: 230 MG/DL
EGFRCR SERPLBLD CKD-EPI 2021: 54 ML/MIN/1.73
GLOBULIN SER CALC-MCNC: 2.5 G/DL (ref 1.5–4.5)
GLUCOSE SERPL-MCNC: 90 MG/DL (ref 70–99)
HBA1C MFR BLD: 6.4 % (ref 4.8–5.6)
HDLC SERPL-MCNC: 39 MG/DL
LDLC SERPL CALC-MCNC: 74 MG/DL (ref 0–99)
MICROALBUMIN UR-MCNC: 82 UG/ML
POTASSIUM SERPL-SCNC: 4.5 MMOL/L (ref 3.5–5.2)
PROT SERPL-MCNC: 6.6 G/DL (ref 6–8.5)
SODIUM SERPL-SCNC: 143 MMOL/L (ref 134–144)
T4 FREE SERPL-MCNC: 1.33 NG/DL (ref 0.82–1.77)
TRIGL SERPL-MCNC: 218 MG/DL (ref 0–149)
TSH SERPL DL<=0.005 MIU/L-ACNC: 3.39 UIU/ML (ref 0.45–4.5)
VLDLC SERPL CALC-MCNC: 36 MG/DL (ref 5–40)

## 2025-07-08 ENCOUNTER — ON CAMPUS - OUTPATIENT (AMBULATORY)
Dept: URBAN - METROPOLITAN AREA HOSPITAL 2 | Facility: HOSPITAL | Age: 75
End: 2025-07-08
Payer: MEDICARE

## 2025-07-08 ENCOUNTER — OFFICE (AMBULATORY)
Dept: URBAN - METROPOLITAN AREA PATHOLOGY 19 | Facility: PATHOLOGY | Age: 75
End: 2025-07-08
Payer: MEDICARE

## 2025-07-08 ENCOUNTER — OFFICE (AMBULATORY)
Dept: URBAN - METROPOLITAN AREA PATHOLOGY 19 | Facility: PATHOLOGY | Age: 75
End: 2025-07-08
Payer: COMMERCIAL

## 2025-07-08 VITALS
RESPIRATION RATE: 17 BRPM | DIASTOLIC BLOOD PRESSURE: 81 MMHG | RESPIRATION RATE: 18 BRPM | OXYGEN SATURATION: 98 % | DIASTOLIC BLOOD PRESSURE: 68 MMHG | HEART RATE: 72 BPM | SYSTOLIC BLOOD PRESSURE: 124 MMHG | HEART RATE: 63 BPM | SYSTOLIC BLOOD PRESSURE: 134 MMHG | RESPIRATION RATE: 15 BRPM | DIASTOLIC BLOOD PRESSURE: 63 MMHG | TEMPERATURE: 97.2 F | SYSTOLIC BLOOD PRESSURE: 116 MMHG | SYSTOLIC BLOOD PRESSURE: 107 MMHG | HEART RATE: 69 BPM | HEART RATE: 77 BPM | OXYGEN SATURATION: 96 % | OXYGEN SATURATION: 97 % | WEIGHT: 196 LBS | OXYGEN SATURATION: 100 % | DIASTOLIC BLOOD PRESSURE: 65 MMHG | HEIGHT: 64 IN | RESPIRATION RATE: 16 BRPM | SYSTOLIC BLOOD PRESSURE: 128 MMHG

## 2025-07-08 DIAGNOSIS — K44.9 DIAPHRAGMATIC HERNIA WITHOUT OBSTRUCTION OR GANGRENE: ICD-10-CM

## 2025-07-08 DIAGNOSIS — Z48.815 ENCOUNTER FOR SURGICAL AFTERCARE FOLLOWING SURGERY ON THE DI: ICD-10-CM

## 2025-07-08 DIAGNOSIS — K22.89 OTHER SPECIFIED DISEASE OF ESOPHAGUS: ICD-10-CM

## 2025-07-08 DIAGNOSIS — K22.70 BARRETT'S ESOPHAGUS WITHOUT DYSPLASIA: ICD-10-CM

## 2025-07-08 PROCEDURE — 43239 EGD BIOPSY SINGLE/MULTIPLE: CPT | Performed by: INTERNAL MEDICINE

## 2025-07-08 PROCEDURE — 88305 TISSUE EXAM BY PATHOLOGIST: CPT | Mod: 26 | Performed by: PATHOLOGY

## 2025-07-10 ENCOUNTER — OFFICE VISIT (OUTPATIENT)
Dept: ENDOCRINOLOGY | Facility: CLINIC | Age: 75
End: 2025-07-10
Payer: MEDICARE

## 2025-07-10 VITALS
HEART RATE: 80 BPM | DIASTOLIC BLOOD PRESSURE: 78 MMHG | SYSTOLIC BLOOD PRESSURE: 124 MMHG | HEIGHT: 63 IN | OXYGEN SATURATION: 98 % | WEIGHT: 193 LBS | BODY MASS INDEX: 34.2 KG/M2

## 2025-07-10 DIAGNOSIS — I10 ESSENTIAL HYPERTENSION: ICD-10-CM

## 2025-07-10 DIAGNOSIS — E11.42 DIABETIC PERIPHERAL NEUROPATHY: ICD-10-CM

## 2025-07-10 DIAGNOSIS — E03.9 ACQUIRED HYPOTHYROIDISM: ICD-10-CM

## 2025-07-10 DIAGNOSIS — E78.2 MIXED HYPERLIPIDEMIA: ICD-10-CM

## 2025-07-10 DIAGNOSIS — E11.65 TYPE 2 DIABETES MELLITUS WITH HYPERGLYCEMIA, WITHOUT LONG-TERM CURRENT USE OF INSULIN: Primary | ICD-10-CM

## 2025-07-10 RX ORDER — METOPROLOL TARTRATE 100 MG/1
TABLET ORAL
COMMUNITY

## 2025-07-10 RX ORDER — PIOGLITAZONE 15 MG/1
TABLET ORAL
Qty: 90 TABLET | Refills: 3
Start: 2025-07-10

## 2025-07-10 NOTE — PATIENT INSTRUCTIONS
Decrease pioglitazone to 15 mg p.o. daily  Continue Mounjaro at 2.5 mg subcu weekly  Continue to work on your diet and activity  Always keep glucose source in case of low blood sugar  Labs before follow-up.

## 2025-07-10 NOTE — PROGRESS NOTES
Endocrine Progress Note Outpatient     Patient Care Team:  Sudha Edwards APRN as PCP - General (Nurse Practitioner)  Preeti Herrera MD as Consulting Physician (Endocrinology)  La Amato MD as Consulting Physician (Cardiology)  My Wiseman APRN as Nurse Practitioner (Cardiology)    Chief Complaint: Follow-up type 2 diabetes    HPI: 75-year-old female with history of type 2 diabetes, hypertension, hyperlipidemia and hypothyroidism is here for follow-up.    For type 2 diabetes: She is currently on Mounjaro 2.5 mg subcu weekly and tolerating fairly well.  She is also on pioglitazone at 30 mg once a day.  She is try to cut the pills in half and that was difficult so she continued at 30 mg p.o. daily.  She did bring in blood sugar records for review most of them are running very well below 120.     Hypertension: Well-controlled    Hypothyroidism: On levothyroxine supplementation    Hyperlipidemia: Currently on rosuvastatin.    She is status post stent placement in the left lower extremity on 2023.    Past Medical History:   Diagnosis Date    Bunion     Callus     Chronic kidney disease     Difficulty walking I have moderate PAD    Disease of thyroid gland     Diverticulitis     Exertional dyspnea 2021    Heart murmur     History of transfusion Back in     Hyperlipidemia     Hypertension     Hypothyroidism     Ingrown toenail     Plantar fasciitis about 12 years ago    Type 2 diabetes mellitus     Uncontrolled type 2 diabetes mellitus with hyperglycemia 2019    Unstable angina 2021       Social History     Socioeconomic History    Marital status:     Number of children: 3    Years of education: 12   Tobacco Use    Smoking status: Former     Current packs/day: 0.00     Average packs/day: 1 pack/day for 45.0 years (45.0 ttl pk-yrs)     Types: Cigarettes     Start date: 1967     Quit date: 2012     Years since quittin.1     Passive exposure: Past     Smokeless tobacco: Never    Tobacco comments:     quit several times for brief periods (pregnancies, etc.) Maybe more like 40 years   Vaping Use    Vaping status: Never Used   Substance and Sexual Activity    Alcohol use: Not Currently     Comment: Not a teetotaler, but maybe 8-10 drinks a year    Drug use: Never    Sexual activity: Not Currently     Partners: Male     Birth control/protection: Post-menopausal, None     Comment: why do you need to know?       Family History   Problem Relation Age of Onset    Stroke Mother     Cancer Father         Prostrate    Heart disease Brother     Cancer Brother         Pancreas    Cancer Maternal Grandmother         Colon    Heart disease Paternal Grandmother        Allergies   Allergen Reactions    Amoxicillin-Pot Clavulanate Diarrhea    Ciprofloxacin Myalgia    Beta Adrenergic Blockers Unknown - Low Severity    Amoxicillin Diarrhea    Clavulanic Acid Diarrhea    Flagyl [Metronidazole] Myalgia    Nitrofurantoin Unknown - Low Severity       ROS:   Constitutional:  Admit fatigue, tiredness.    Eyes:  Denies change in visual acuity   HENT:  Denies nasal congestion or sore throat   Respiratory: denies cough, admit shortness of breath.   Cardiovascular:  denies chest pain, edema   GI:  Denies abdominal pain, nausea, vomiting.   Musculoskeletal:  Denies back pain or joint pain   Integument:  Denies dry skin and rash   Neurologic:  Denies headache, focal weakness or sensory changes   Endocrine:  Denies polyuria or polydipsia   Psychiatric:  Denies depression or anxiety      Vitals:    07/10/25 1403   BP: 124/78   Pulse: 80   SpO2: 98%     BMI: 34.2    Physical Exam:  GEN: NAD, conversant, obese  CHEST: CTA  CVS: RRR  PSYCH: AOX3, appropriate mood, affect normal      Results Review:     I reviewed the patient's new clinical results.    Lab Results   Component Value Date    HGBA1C 6.4 (H) 07/03/2025    HGBA1C 7.03 (H) 08/08/2024    HGBA1C 7.40 (H) 01/25/2024      Lab Results    Component Value Date    GLUCOSE 90 07/03/2025    BUN 25 07/03/2025    CREATININE 1.06 (H) 07/03/2025    EGFRIFNONA 47 (L) 02/22/2022    BCR 24 07/03/2025    K 4.5 07/03/2025    CO2 23 07/03/2025    CALCIUM 9.5 07/03/2025    ALBUMIN 4.1 07/03/2025    AST 19 07/03/2025    ALT 17 07/03/2025    CHOL 130 08/08/2024    TRIG 218 (H) 07/03/2025    LDL 74 07/03/2025    HDL 39 (L) 07/03/2025     Lab Results   Component Value Date    TSH 3.390 07/03/2025    FREET4 1.33 07/03/2025       Medication Review: Reviewed.       Current Outpatient Medications:     Alpha-Lipoic Acid 600 MG capsule, Take  by mouth., Disp: , Rfl:     amLODIPine-benazepril (LOTREL) 10-20 MG per capsule, , Disp: , Rfl:     aspirin 81 MG EC tablet, Take 1 tablet by mouth Daily., Disp: , Rfl:     Cholecalciferol (Vitamin D3) 50 MCG (2000 UT) capsule, Take 1 capsule by mouth Daily., Disp: 90 capsule, Rfl: 3    clopidogrel (PLAVIX) 75 MG tablet, Take 1 tablet by mouth Daily., Disp: , Rfl:     famotidine (PEPCID) 40 MG tablet, Take 0.5 tablets by mouth Daily., Disp: , Rfl:     fluticasone (Flonase Allergy Relief) 50 MCG/ACT nasal spray, Every 12 (Twelve) Hours., Disp: , Rfl:     glucose blood (Accu-Chek Guide Test) test strip, Use to test BS twice daily.  DX E11.65, Disp: 200 each, Rfl: 2    latanoprost (XALATAN) 0.005 % ophthalmic solution, Administer 1 drop to both eyes Every Night., Disp: , Rfl:     levothyroxine (SYNTHROID, LEVOTHROID) 100 MCG tablet, TAKE 1 TABLET DAILY, Disp: 90 tablet, Rfl: 2    metoprolol succinate XL (TOPROL-XL) 50 MG 24 hr tablet, , Disp: , Rfl:     multivitamin with minerals (Centrum Women) tablet tablet, Take  by mouth Daily., Disp: , Rfl:     omeprazole (priLOSEC) 40 MG capsule, , Disp: , Rfl:     ondansetron (ZOFRAN) 4 MG tablet, , Disp: , Rfl:     pioglitazone (ACTOS) 30 MG tablet, Take 0.5 tablets by mouth Daily., Disp: , Rfl:     rosuvastatin (CRESTOR) 10 MG tablet, TAKE 1 TABLET BY MOUTH DAILY, Disp: 90 tablet, Rfl: 2     Tirzepatide (Mounjaro) 2.5 MG/0.5ML solution auto-injector, Inject 2.5 mg under the skin into the appropriate area as directed 1 (One) Time Per Week., Disp: 6 mL, Rfl: 1    metoprolol tartrate (LOPRESSOR) 100 MG tablet, tablet, Disp: , Rfl:       Assessment & Plan   1.  Diabetes mellitus type 2 with hyperglycemia: Well-controlled with A1c now at 6.4%.  We will continue Mounjaro 2.5 mg subcu weekly.  She does have pioglitazone 30 mg pills which she will take 1 every other day until she finishes them and then I will see change her to pioglitazone 15 mg p.o. daily and will follow her back in 6 months.  She is advised to continue to work on diet activity and always keep glucose source in case of low blood sugars.    2.  Hypertension: Well-controlled, continue current medications.    3.  Hyperlipidemia: On rosuvastatin, will follow lipid panel.    4.  Hypothyroidism: Well-controlled, continue levothyroxine.    5.  Diabetic peripheral neuropathy: Overall stable.    Assessment and plan from March 10, 2025 reviewed and updated.                Preeti Herrera MD FACE.

## 2025-07-29 ENCOUNTER — SPECIALTY PHARMACY (OUTPATIENT)
Dept: ENDOCRINOLOGY | Facility: CLINIC | Age: 75
End: 2025-07-29
Payer: MEDICARE

## 2025-07-29 RX ORDER — TIRZEPATIDE 2.5 MG/.5ML
2.5 INJECTION, SOLUTION SUBCUTANEOUS WEEKLY
Qty: 6 ML | Refills: 1 | Status: SHIPPED | OUTPATIENT
Start: 2025-07-29

## 2025-07-29 NOTE — PROGRESS NOTES
Specialty Pharmacy Patient Management Program  Endocrinology Refill Outreach      Katty is a 75 y.o. female contacted today regarding refills of her medication(s).    Specialty medication(s) and dose(s) confirmed: Mounjaro 2.5mg    Refill Questions      Flowsheet Row Most Recent Value   Changes to allergies? No   Changes to medications? No   New conditions or infections since last clinic visit No   Unplanned office visit, urgent care, ED, or hospital admission in the last 4 weeks  No   How does patient/caregiver feel medication is working? Very good   Financial problems or insurance changes  No   Since the previous refill, were any specialty medication doses or scheduled injections missed or delayed?  No   Does this patient require a clinical escalation to a pharmacist? No          Delivery Questions      Flowsheet Row Most Recent Value   Delivery method  at Pharmacy   Delivery address verified with patient/caregiver? Yes  [Pickup]   Delivery address Other (Enter below)  [Pickup]   Number of medications in delivery 1   Medication(s) being filled and delivered Tirzepatide (Mounjaro)   Copay verified? Yes   Copay amount $109.72   Copay form of payment Pay at pickup            Follow-Up: 84d      Wei Poe, PharmD, MPH  Clinical Specialty Pharmacist  7/29/2025  09:10 EDT

## 2025-07-29 NOTE — PROGRESS NOTES
Specialty Pharmacy Patient Management Program  Endocrinology Reassessment     Katty Ramirez was referred by an Endocrinology provider to the Endocrinology Patient Management program offered by [facility] for Type 2 Diabetes. A follow-up outreach was conducted, including assessment of continued therapy appropriateness, medication adherence, and side effect incidence and management for Mounjaro.    Changes to Insurance Coverage or Financial Support  No changes at this time    Relevant Past Medical History and Comorbidities  Relevant medical history and concomitant health conditions were discussed with the patient. The patient's chart has been reviewed for relevant past medical history and comorbid health conditions and updated as necessary.   Past Medical History:   Diagnosis Date    Bunion     Callus     Chronic kidney disease     Difficulty walking I have moderate PAD    Disease of thyroid gland     Diverticulitis     Exertional dyspnea 2021    Heart murmur     History of transfusion Back in     Hyperlipidemia     Hypertension     Hypothyroidism     Ingrown toenail     Plantar fasciitis about 12 years ago    Type 2 diabetes mellitus     Uncontrolled type 2 diabetes mellitus with hyperglycemia 2019    Unstable angina 2021     Social History     Socioeconomic History    Marital status:     Number of children: 3    Years of education: 12   Tobacco Use    Smoking status: Former     Current packs/day: 0.00     Average packs/day: 1 pack/day for 45.0 years (45.0 ttl pk-yrs)     Types: Cigarettes     Start date: 1967     Quit date: 2012     Years since quittin.1     Passive exposure: Past    Smokeless tobacco: Never    Tobacco comments:     quit several times for brief periods (pregnancies, etc.) Maybe more like 40 years   Vaping Use    Vaping status: Never Used   Substance and Sexual Activity    Alcohol use: Not Currently     Comment: Not a teetotaler,  but maybe 8-10 drinks a year    Drug use: Never    Sexual activity: Not Currently     Partners: Male     Birth control/protection: Post-menopausal, None     Comment: why do you need to know?     Problem list reviewed by Wei Poe, PharmD on 7/29/2025 at  9:11 AM    Hospitalizations and Urgent Care Since Last Assessment  ED Visits, Admissions, or Hospitalizations: 2/2025 - R Leg Pain, since resolved  Urgent Office Visits: 3/2025 - [facility]    Allergies  Known allergies and reactions were discussed with the patient. The patient's chart has been reviewed for allergy information and updated as necessary.   Allergies   Allergen Reactions    Amoxicillin-Pot Clavulanate Diarrhea    Ciprofloxacin Myalgia    Beta Adrenergic Blockers Unknown - Low Severity    Amoxicillin Diarrhea    Clavulanic Acid Diarrhea    Flagyl [Metronidazole] Myalgia    Nitrofurantoin Unknown - Low Severity     Allergies reviewed by Wei Poe, PharmD on 7/29/2025 at  9:10 AM    Relevant Laboratory Values  Relevant laboratory values were discussed with the patient. The following specialty medication dose adjustment(s) are recommended: Phone Reassessment - A1c decreased to within goal range of <7% between last two visits (7.03% to 6.4%).  Goal achieved.  Will now focus on goal of maintaining A1c of <7%.  Will continue Mounjaro 2.5mg weekly per pt comfort level and will continue to follow.      A1C Last 3 Results          8/8/2024    08:31 7/3/2025    08:22   HGBA1C Last 3 Results   Hemoglobin A1C 7.03  6.4      Lab Results   Component Value Date    HGBA1C 6.4 (H) 07/03/2025     Lab Results   Component Value Date    GLUCOSE 90 07/03/2025    CALCIUM 9.5 07/03/2025     07/03/2025    K 4.5 07/03/2025    CO2 23 07/03/2025     07/03/2025    BUN 25 07/03/2025    CREATININE 1.06 (H) 07/03/2025    EGFRIFNONA 47 (L) 02/22/2022    BCR 24 07/03/2025    ANIONGAP 6.5 08/08/2024     Lab Results   Component Value Date    CHOL 130 08/08/2024     CHLPL 149 07/03/2025    TRIG 218 (H) 07/03/2025    HDL 39 (L) 07/03/2025    LDL 74 07/03/2025     Microalbumin          8/8/2024    08:31 7/3/2025    08:22   Microalbumin   Microalbumin, Urine 5.9  82.0      Current Medication List  This medication list has been reviewed with the patient and evaluated for any interactions or necessary modifications/recommendations, and updated to include all prescription medications, OTC medications, and supplements the patient is currently taking.  This list reflects what is contained in the patient's profile, which has also been marked as reviewed to communicate to other providers it is the most up to date version of the patient's current medication therapy.     Current Outpatient Medications:     Tirzepatide (Mounjaro) 2.5 MG/0.5ML solution auto-injector, Inject 2.5 mg under the skin into the appropriate area as directed 1 (One) Time Per Week., Disp: 6 mL, Rfl: 1    Alpha-Lipoic Acid 600 MG capsule, Take  by mouth., Disp: , Rfl:     amLODIPine-benazepril (LOTREL) 10-20 MG per capsule, , Disp: , Rfl:     aspirin 81 MG EC tablet, Take 1 tablet by mouth Daily., Disp: , Rfl:     Cholecalciferol (Vitamin D3) 50 MCG (2000 UT) capsule, Take 1 capsule by mouth Daily., Disp: 90 capsule, Rfl: 3    clopidogrel (PLAVIX) 75 MG tablet, Take 1 tablet by mouth Daily., Disp: , Rfl:     famotidine (PEPCID) 40 MG tablet, Take 0.5 tablets by mouth Daily., Disp: , Rfl:     fluticasone (Flonase Allergy Relief) 50 MCG/ACT nasal spray, Every 12 (Twelve) Hours., Disp: , Rfl:     glucose blood (Accu-Chek Guide Test) test strip, Use to test BS twice daily.  DX E11.65, Disp: 200 each, Rfl: 2    latanoprost (XALATAN) 0.005 % ophthalmic solution, Administer 1 drop to both eyes Every Night., Disp: , Rfl:     levothyroxine (SYNTHROID, LEVOTHROID) 100 MCG tablet, TAKE 1 TABLET DAILY, Disp: 90 tablet, Rfl: 2    metoprolol succinate XL (TOPROL-XL) 50 MG 24 hr tablet, , Disp: , Rfl:     metoprolol tartrate  (LOPRESSOR) 100 MG tablet, tablet (Patient not taking: Reported on 7/29/2025), Disp: , Rfl:     multivitamin with minerals (Centrum Women) tablet tablet, Take  by mouth Daily., Disp: , Rfl:     omeprazole (priLOSEC) 40 MG capsule, , Disp: , Rfl:     ondansetron (ZOFRAN) 4 MG tablet, , Disp: , Rfl:     pioglitazone (ACTOS) 15 MG tablet, Take 1 tablet p.o. daily., Disp: 90 tablet, Rfl: 3    rosuvastatin (CRESTOR) 10 MG tablet, TAKE 1 TABLET BY MOUTH DAILY, Disp: 90 tablet, Rfl: 2    Medicines reviewed by Wei Poe, PharmD on 7/29/2025 at  9:11 AM    Drug Interactions  No Clinically Significant DDIs Were Identified at Present Time Upon Marking Medications Reviewed    Recommended Medications Assessment  Aspirin: Currently Taking   Statin: Currently Taking   ACEi/ARB: Not Taking Currently    Adverse Drug Reactions  Medication tolerability: Tolerating with no to minimal ADRs  Medication plan: Continue therapy with normal follow-up  Plan for ADR Management: N/A at this time.  Pt reports they are tolerating therapy well.    Adherence, Self-Administration, and Current Therapy Problems  Adherence related to the patient's specialty therapy was discussed with the patient. The Adherence segment of this outreach has been reviewed and updated.     Adherence Questions  Linked Medication(s) Assessed: Tirzepatide (Mounjaro)  On average, how many doses/injections does the patient miss per month?: 0  What are the identified reasons for non-adherence or missed doses? : no problems identified  What is the estimated medication adherence level?: %  Based on the patient/caregiver response and refill history, does this patient require an MTP to track adherence improvements?: no    Additional Barriers to Patient Self-Administration: No known barriers at this time  Methods for Supporting Patient Self-Administration: Continued  enrollment    Open Medication Therapy Problems  No medication therapy recommendations to  display    Goals of Therapy  Goals related to the patient's specialty therapy were discussed with the patient. The Patient Goals segment of this outreach has been reviewed and updated.   Goals Addressed Today        Specialty Pharmacy General Goal      Maintain A1c of <7%      Lab Results    Component Value Date     Phone Reassessment  07/29/2025 Phone Reassessment - A1c decreased to within goal range of <7% between last two visits (7.03% to 6.4%).  Goal achieved.  Will now focus on goal of maintaining A1c of <7%.  Will continue Mounjaro 2.5mg weekly per pt comfort level and will continue to follow.  CR    HGBA1C 6.4 (H) 07/03/2025     Initial Assessment  03/10/2025 Initial Assessment - Starting Mounjaro 2.5mg weekly and enrolling in  at this time.  Baseline A1c 7.03%, will follow.  CR    HGBA1C 7.03 (H) 08/08/2024     HGBA1C 7.40 (H) 01/25/2024     HGBA1C 7.40 (H) 09/26/2023     HGBA1C 6.5 (H) 10/18/2022     HGBA1C 5.8 (H) 03/03/2022                  Quality of Life Assessment   Quality of Life related to the patient's enrollment in the patient management program and services provided was discussed with the patient. The QOL segment of this outreach has been reviewed and updated.  Quality of Life Improvement Scale: 9-A good deal better    Reassessment Plan & Follow-Up  1. Medication Therapy Changes: Phone Reassessment - A1c decreased to within goal range of <7% between last two visits (7.03% to 6.4%).  Goal achieved.  Will now focus on goal of maintaining A1c of <7%.  Will continue Mounjaro 2.5mg weekly per pt comfort level and will continue to follow.    2. Related Plans, Therapy Recommendations, or Issues to Be Addressed: None  3. Pharmacist to perform regular assessments no more than (6) months from the previous assessment.  4. Care Coordinator to set up future refill outreaches, coordinate prescription delivery, and escalate clinical questions to pharmacist.    Attestation  Therapeutic appropriateness:  Appropriate   I attest the patient was actively involved in and has agreed to the above plan of care.  If the prescribed therapy is at any point deemed not appropriate based on the current or future assessments, a consultation will be initiated with the patient's specialty care provider to determine the best course of action. The revised plan of therapy will be documented along with any required assessments and/or additional patient education provided.       Wei Poe, PharmD, MPH  Clinical Specialty Pharmacist, Endocrinology  7/29/2025  09:23 EDT    Discussed the aforementioned information with the patient via Telephone.

## 2025-07-29 NOTE — PROGRESS NOTES
Specialty Pharmacy Patient Management Program       Katty Ramirez is a 75 y.o. female seen by an Endocrinology provider for Type 2 Diabetes and enrolled in the Endocrinology Patient Management program offered by [facility].      Requested Prescriptions     Pending Prescriptions Disp Refills    Tirzepatide (Mounjaro) 2.5 MG/0.5ML solution auto-injector 6 mL 1     Sig: Inject 2.5 mg under the skin into the appropriate area as directed 1 (One) Time Per Week.       Refill sent in to patient's pharmacy for above medication prescribed per telephone order by Dr. Herrera.   Last office visit 7/10/2025.  Next visit scheduled 1/13/2026.      Wei Poe, PharmD, MPH  Clinical Specialty Pharmacist, Endocrinology  7/29/2025  09:03 EDT

## (undated) DEVICE — CATH MPAC STP 6F: Brand: SUPER TORQUE

## (undated) DEVICE — BITEBLOCK ENDO W/STRAP 60F A/ LF DISP

## (undated) DEVICE — ST ACC MICROPUNCTURE STFF/CANN PLAT/TP 4F 21G 40CM

## (undated) DEVICE — PK ENDO GI 50

## (undated) DEVICE — PK TRY HEART CATH 50

## (undated) DEVICE — SINGLE-USE BIOPSY FORCEPS: Brand: RADIAL JAW 4

## (undated) DEVICE — PERCLOSE PROGLIDE™ SUTURE-MEDIATED CLOSURE SYSTEM: Brand: PERCLOSE PROGLIDE™

## (undated) DEVICE — PINNACLE INTRODUCER SHEATH: Brand: PINNACLE

## (undated) DEVICE — GW PTFE EMERALD HEPCOAT FC J TIP STD .035 3MM 150CM